# Patient Record
Sex: FEMALE | Race: WHITE | Employment: FULL TIME
[De-identification: names, ages, dates, MRNs, and addresses within clinical notes are randomized per-mention and may not be internally consistent; named-entity substitution may affect disease eponyms.]

---

## 2017-01-03 ENCOUNTER — OFFICE VISIT (OUTPATIENT)
Dept: FAMILY MEDICINE CLINIC | Facility: CLINIC | Age: 22
End: 2017-01-03

## 2017-01-03 VITALS
DIASTOLIC BLOOD PRESSURE: 72 MMHG | BODY MASS INDEX: 40.62 KG/M2 | HEART RATE: 64 BPM | WEIGHT: 215 LBS | SYSTOLIC BLOOD PRESSURE: 120 MMHG | OXYGEN SATURATION: 99 %

## 2017-01-03 DIAGNOSIS — G43.009 MIGRAINE WITHOUT AURA AND WITHOUT STATUS MIGRAINOSUS, NOT INTRACTABLE: ICD-10-CM

## 2017-01-03 DIAGNOSIS — F41.9 ANXIETY AND DEPRESSION: Primary | ICD-10-CM

## 2017-01-03 DIAGNOSIS — F32.A ANXIETY AND DEPRESSION: Primary | ICD-10-CM

## 2017-01-03 PROCEDURE — 99213 OFFICE O/P EST LOW 20 MIN: CPT | Performed by: NURSE PRACTITIONER

## 2017-01-03 RX ORDER — FLUOXETINE HYDROCHLORIDE 20 MG/1
20 CAPSULE ORAL DAILY
Qty: 30 CAPSULE | Refills: 2 | Status: SHIPPED | OUTPATIENT
Start: 2017-01-03 | End: 2017-04-09 | Stop reason: SDUPTHER

## 2017-01-03 ASSESSMENT — ENCOUNTER SYMPTOMS
DIARRHEA: 0
WHEEZING: 0
RESPIRATORY NEGATIVE: 1
GASTROINTESTINAL NEGATIVE: 1
CONSTIPATION: 0
SHORTNESS OF BREATH: 0
ABDOMINAL PAIN: 0
COUGH: 0
ALLERGIC/IMMUNOLOGIC NEGATIVE: 1
EYES NEGATIVE: 1

## 2017-01-03 ASSESSMENT — PATIENT HEALTH QUESTIONNAIRE - PHQ9
SUM OF ALL RESPONSES TO PHQ9 QUESTIONS 1 & 2: 2
SUM OF ALL RESPONSES TO PHQ QUESTIONS 1-9: 2
1. LITTLE INTEREST OR PLEASURE IN DOING THINGS: 1
2. FEELING DOWN, DEPRESSED OR HOPELESS: 1

## 2017-01-31 RX ORDER — RANITIDINE 150 MG/1
TABLET ORAL
Qty: 30 TABLET | Refills: 5 | Status: SHIPPED | OUTPATIENT
Start: 2017-01-31 | End: 2017-08-10 | Stop reason: SDUPTHER

## 2017-03-08 ENCOUNTER — OFFICE VISIT (OUTPATIENT)
Dept: FAMILY MEDICINE CLINIC | Facility: CLINIC | Age: 22
End: 2017-03-08

## 2017-03-08 ENCOUNTER — HOSPITAL ENCOUNTER (OUTPATIENT)
Age: 22
Setting detail: SPECIMEN
Discharge: HOME | End: 2017-03-08
Payer: COMMERCIAL

## 2017-03-08 VITALS
DIASTOLIC BLOOD PRESSURE: 68 MMHG | SYSTOLIC BLOOD PRESSURE: 120 MMHG | TEMPERATURE: 98 F | WEIGHT: 211 LBS | OXYGEN SATURATION: 99 % | BODY MASS INDEX: 39.87 KG/M2 | HEART RATE: 54 BPM

## 2017-03-08 DIAGNOSIS — N89.8 VAGINAL DISCHARGE: Primary | ICD-10-CM

## 2017-03-08 DIAGNOSIS — N89.8 VAGINAL DISCHARGE: ICD-10-CM

## 2017-03-08 DIAGNOSIS — R30.0 DYSURIA: ICD-10-CM

## 2017-03-08 LAB
BILIRUBIN, POC: NEGATIVE
BLOOD URINE, POC: NEGATIVE
CLARITY, POC: CLEAR
COLOR, POC: NORMAL
DIRECT EXAM: NORMAL
GLUCOSE URINE, POC: NEGATIVE
KETONES, POC: NEGATIVE
LEUKOCYTE EST, POC: NEGATIVE
Lab: NORMAL
NITRITE, POC: NEGATIVE
PH, POC: 5.5
PROTEIN, POC: NEGATIVE
SPECIFIC GRAVITY, POC: >1.03
SPECIMEN DESCRIPTION: NORMAL
STATUS: NORMAL
UROBILINOGEN, POC: NEGATIVE

## 2017-03-08 PROCEDURE — 81002 URINALYSIS NONAUTO W/O SCOPE: CPT | Performed by: NURSE PRACTITIONER

## 2017-03-08 PROCEDURE — 99213 OFFICE O/P EST LOW 20 MIN: CPT | Performed by: NURSE PRACTITIONER

## 2017-03-08 ASSESSMENT — ENCOUNTER SYMPTOMS
CONSTIPATION: 0
ABDOMINAL PAIN: 0
COUGH: 0
GASTROINTESTINAL NEGATIVE: 1
DIARRHEA: 0
RESPIRATORY NEGATIVE: 1
ALLERGIC/IMMUNOLOGIC NEGATIVE: 1
WHEEZING: 0
SHORTNESS OF BREATH: 0
EYES NEGATIVE: 1

## 2017-03-09 LAB
C TRACH DNA GENITAL QL NAA+PROBE: NEGATIVE
N. GONORRHOEAE DNA: NEGATIVE

## 2017-06-26 ENCOUNTER — HOSPITAL ENCOUNTER (OUTPATIENT)
Age: 22
Setting detail: SPECIMEN
Discharge: HOME | End: 2017-06-26
Payer: COMMERCIAL

## 2017-06-26 ENCOUNTER — OFFICE VISIT (OUTPATIENT)
Dept: FAMILY MEDICINE CLINIC | Age: 22
End: 2017-06-26
Payer: COMMERCIAL

## 2017-06-26 VITALS
DIASTOLIC BLOOD PRESSURE: 64 MMHG | WEIGHT: 209 LBS | TEMPERATURE: 98.5 F | OXYGEN SATURATION: 99 % | HEART RATE: 64 BPM | BODY MASS INDEX: 39.49 KG/M2 | SYSTOLIC BLOOD PRESSURE: 110 MMHG

## 2017-06-26 DIAGNOSIS — E66.9 OBESITY, UNSPECIFIED OBESITY SEVERITY, UNSPECIFIED OBESITY TYPE: ICD-10-CM

## 2017-06-26 DIAGNOSIS — Z00.00 HEALTH CARE MAINTENANCE: ICD-10-CM

## 2017-06-26 DIAGNOSIS — R53.83 FATIGUE, UNSPECIFIED TYPE: ICD-10-CM

## 2017-06-26 DIAGNOSIS — R53.83 FATIGUE, UNSPECIFIED TYPE: Primary | ICD-10-CM

## 2017-06-26 LAB
ABSOLUTE EOS #: 0.1 K/UL (ref 0–0.4)
ABSOLUTE LYMPH #: 2.1 K/UL (ref 1–4.8)
ABSOLUTE MONO #: 0.5 K/UL (ref 0.1–1.2)
ALBUMIN SERPL-MCNC: 4.3 G/DL (ref 3.5–5.2)
ALBUMIN/GLOBULIN RATIO: 1.5 (ref 1–2.5)
ALP BLD-CCNC: 66 U/L (ref 35–104)
ALT SERPL-CCNC: 33 U/L (ref 5–33)
ANION GAP SERPL CALCULATED.3IONS-SCNC: 15 MMOL/L (ref 9–17)
AST SERPL-CCNC: 27 U/L
BASOPHILS # BLD: 0 %
BASOPHILS ABSOLUTE: 0 K/UL (ref 0–0.2)
BILIRUB SERPL-MCNC: 0.27 MG/DL (ref 0.3–1.2)
BUN BLDV-MCNC: 18 MG/DL (ref 6–20)
BUN/CREAT BLD: ABNORMAL (ref 9–20)
CALCIUM SERPL-MCNC: 9.4 MG/DL (ref 8.6–10.4)
CHLORIDE BLD-SCNC: 104 MMOL/L (ref 98–107)
CHOLESTEROL/HDL RATIO: 3.7
CHOLESTEROL: 164 MG/DL
CO2: 23 MMOL/L (ref 20–31)
CREAT SERPL-MCNC: 0.73 MG/DL (ref 0.5–0.9)
DIFFERENTIAL TYPE: NORMAL
EOSINOPHILS RELATIVE PERCENT: 1 %
GFR AFRICAN AMERICAN: >60 ML/MIN
GFR NON-AFRICAN AMERICAN: >60 ML/MIN
GFR SERPL CREATININE-BSD FRML MDRD: ABNORMAL ML/MIN/{1.73_M2}
GFR SERPL CREATININE-BSD FRML MDRD: ABNORMAL ML/MIN/{1.73_M2}
GLUCOSE BLD-MCNC: 93 MG/DL (ref 70–99)
HCT VFR BLD CALC: 38.4 % (ref 36–46)
HDLC SERPL-MCNC: 44 MG/DL
HEMOGLOBIN: 12.9 G/DL (ref 12–16)
LDL CHOLESTEROL: 108 MG/DL (ref 0–130)
LYMPHOCYTES # BLD: 30 %
MCH RBC QN AUTO: 28.3 PG (ref 26–34)
MCHC RBC AUTO-ENTMCNC: 33.6 G/DL (ref 31–37)
MCV RBC AUTO: 84.2 FL (ref 80–100)
MONOCYTES # BLD: 7 %
PDW BLD-RTO: 13.8 % (ref 12.5–15.4)
PLATELET # BLD: 197 K/UL (ref 140–450)
PLATELET ESTIMATE: NORMAL
PMV BLD AUTO: 10.8 FL (ref 6–12)
POTASSIUM SERPL-SCNC: 4.1 MMOL/L (ref 3.7–5.3)
RBC # BLD: 4.57 M/UL (ref 4–5.2)
RBC # BLD: NORMAL 10*6/UL
SEG NEUTROPHILS: 62 %
SEGMENTED NEUTROPHILS ABSOLUTE COUNT: 4.4 K/UL (ref 1.8–7.7)
SODIUM BLD-SCNC: 142 MMOL/L (ref 135–144)
THYROXINE, FREE: 1.02 NG/DL (ref 0.93–1.7)
TOTAL PROTEIN: 7.2 G/DL (ref 6.4–8.3)
TRIGL SERPL-MCNC: 59 MG/DL
TSH SERPL DL<=0.05 MIU/L-ACNC: 1.06 MIU/L (ref 0.3–5)
VLDLC SERPL CALC-MCNC: NORMAL MG/DL (ref 1–30)
WBC # BLD: 7.1 K/UL (ref 3.5–11)
WBC # BLD: NORMAL 10*3/UL

## 2017-06-26 PROCEDURE — 99213 OFFICE O/P EST LOW 20 MIN: CPT | Performed by: NURSE PRACTITIONER

## 2017-06-26 ASSESSMENT — ENCOUNTER SYMPTOMS
ABDOMINAL PAIN: 0
COUGH: 0
DIARRHEA: 0
WHEEZING: 0
CONSTIPATION: 0
GASTROINTESTINAL NEGATIVE: 1
SHORTNESS OF BREATH: 0
RESPIRATORY NEGATIVE: 1
ALLERGIC/IMMUNOLOGIC NEGATIVE: 1
EYES NEGATIVE: 1

## 2017-07-13 ENCOUNTER — TELEPHONE (OUTPATIENT)
Dept: OBGYN CLINIC | Age: 22
End: 2017-07-13

## 2017-07-13 RX ORDER — METRONIDAZOLE 500 MG/1
500 TABLET ORAL 2 TIMES DAILY
Qty: 14 TABLET | Refills: 0 | Status: SHIPPED | OUTPATIENT
Start: 2017-07-13 | End: 2017-07-20

## 2017-08-11 RX ORDER — TOPIRAMATE 25 MG/1
TABLET ORAL
Qty: 60 TABLET | Refills: 2 | Status: SHIPPED | OUTPATIENT
Start: 2017-08-11 | End: 2017-11-14 | Stop reason: SDUPTHER

## 2017-09-11 RX ORDER — RANITIDINE 150 MG/1
TABLET ORAL
Qty: 30 TABLET | Refills: 5 | Status: SHIPPED | OUTPATIENT
Start: 2017-09-11 | End: 2018-03-14 | Stop reason: SDUPTHER

## 2017-09-26 ENCOUNTER — OFFICE VISIT (OUTPATIENT)
Dept: OBGYN CLINIC | Age: 22
End: 2017-09-26
Payer: COMMERCIAL

## 2017-09-26 ENCOUNTER — HOSPITAL ENCOUNTER (OUTPATIENT)
Age: 22
Setting detail: SPECIMEN
Discharge: HOME | End: 2017-09-26
Payer: COMMERCIAL

## 2017-09-26 VITALS
BODY MASS INDEX: 39.65 KG/M2 | DIASTOLIC BLOOD PRESSURE: 70 MMHG | HEART RATE: 78 BPM | RESPIRATION RATE: 18 BRPM | SYSTOLIC BLOOD PRESSURE: 116 MMHG | WEIGHT: 210 LBS | HEIGHT: 61 IN

## 2017-09-26 DIAGNOSIS — N89.8 VAGINAL DISCHARGE: Primary | ICD-10-CM

## 2017-09-26 LAB
DIRECT EXAM: NORMAL
Lab: NORMAL
SPECIMEN DESCRIPTION: NORMAL
STATUS: NORMAL

## 2017-09-26 PROCEDURE — 87491 CHLMYD TRACH DNA AMP PROBE: CPT

## 2017-09-26 PROCEDURE — 99214 OFFICE O/P EST MOD 30 MIN: CPT | Performed by: ADVANCED PRACTICE MIDWIFE

## 2017-09-26 PROCEDURE — 87660 TRICHOMONAS VAGIN DIR PROBE: CPT

## 2017-09-26 PROCEDURE — 87510 GARDNER VAG DNA DIR PROBE: CPT

## 2017-09-26 PROCEDURE — 87480 CANDIDA DNA DIR PROBE: CPT

## 2017-09-26 PROCEDURE — 87591 N.GONORRHOEAE DNA AMP PROB: CPT

## 2017-09-26 RX ORDER — NORGESTIMATE AND ETHINYL ESTRADIOL 7DAYSX3 28
KIT ORAL
Qty: 28 TABLET | Refills: 12 | Status: SHIPPED | OUTPATIENT
Start: 2017-09-26 | End: 2018-09-24

## 2017-09-26 ASSESSMENT — PATIENT HEALTH QUESTIONNAIRE - PHQ9
SUM OF ALL RESPONSES TO PHQ9 QUESTIONS 1 & 2: 0
2. FEELING DOWN, DEPRESSED OR HOPELESS: 0
SUM OF ALL RESPONSES TO PHQ QUESTIONS 1-9: 0
1. LITTLE INTEREST OR PLEASURE IN DOING THINGS: 0

## 2017-09-27 LAB
C TRACH DNA GENITAL QL NAA+PROBE: NEGATIVE
N. GONORRHOEAE DNA: NEGATIVE

## 2017-11-13 RX ORDER — FLUOXETINE HYDROCHLORIDE 20 MG/1
CAPSULE ORAL
Qty: 90 CAPSULE | Refills: 1 | Status: SHIPPED | OUTPATIENT
Start: 2017-11-13 | End: 2017-11-14 | Stop reason: SDUPTHER

## 2017-11-13 NOTE — TELEPHONE ENCOUNTER
Last filled 4/10/17  Last seen 17  No future appts. Next Visit Date:  No future appointments. Health Maintenance   Topic Date Due    Flu vaccine (1) 2017    Cervical cancer screen  2018 (Originally 2016)    Chlamydia screen  2018    DTaP/Tdap/Td vaccine (2 - Td) 2024    HIV screen  Completed       Hemoglobin A1C (%)   Date Value   2014 5.2   2014 5.2             ( goal A1C is < 7)   No results found for: LABMICR  LDL Cholesterol (mg/dL)   Date Value   2017 108       (goal LDL is <100)   AST (U/L)   Date Value   2017 27     ALT (U/L)   Date Value   2017 33     BUN (mg/dL)   Date Value   2017 18     BP Readings from Last 3 Encounters:   17 116/70   17 110/64   17 120/68          (goal 120/80)    All Future Testing planned in [facility]  Lab Frequency Next Occurrence   C. Trachomatis / N.  Gonorrhoeae, DNA Once 2017               Patient Active Problem List:     Heart murmur     History of  section     Obesity     Right knee pain     Heart murmur, systolic     Pulmonary HTN     Anxiety and depression     Migraine without aura and without status migrainosus, not intractable

## 2017-11-14 ENCOUNTER — OFFICE VISIT (OUTPATIENT)
Dept: FAMILY MEDICINE CLINIC | Age: 22
End: 2017-11-14
Payer: COMMERCIAL

## 2017-11-14 VITALS
DIASTOLIC BLOOD PRESSURE: 70 MMHG | TEMPERATURE: 98.2 F | HEART RATE: 75 BPM | SYSTOLIC BLOOD PRESSURE: 120 MMHG | BODY MASS INDEX: 39.3 KG/M2 | OXYGEN SATURATION: 99 % | WEIGHT: 208 LBS

## 2017-11-14 DIAGNOSIS — L03.316 CELLULITIS, UMBILICAL: Primary | ICD-10-CM

## 2017-11-14 PROCEDURE — G8427 DOCREV CUR MEDS BY ELIG CLIN: HCPCS | Performed by: NURSE PRACTITIONER

## 2017-11-14 PROCEDURE — 1036F TOBACCO NON-USER: CPT | Performed by: NURSE PRACTITIONER

## 2017-11-14 PROCEDURE — G8482 FLU IMMUNIZE ORDER/ADMIN: HCPCS | Performed by: NURSE PRACTITIONER

## 2017-11-14 PROCEDURE — 99213 OFFICE O/P EST LOW 20 MIN: CPT | Performed by: NURSE PRACTITIONER

## 2017-11-14 PROCEDURE — G8417 CALC BMI ABV UP PARAM F/U: HCPCS | Performed by: NURSE PRACTITIONER

## 2017-11-14 RX ORDER — TOPIRAMATE 25 MG/1
TABLET ORAL
Qty: 180 TABLET | Refills: 1 | Status: SHIPPED | OUTPATIENT
Start: 2017-11-14 | End: 2018-10-02 | Stop reason: SDUPTHER

## 2017-11-14 RX ORDER — FLUOXETINE HYDROCHLORIDE 20 MG/1
CAPSULE ORAL
Qty: 90 CAPSULE | Refills: 1 | Status: SHIPPED | OUTPATIENT
Start: 2017-11-14 | End: 2018-10-02 | Stop reason: SDUPTHER

## 2017-11-14 RX ORDER — AMOXICILLIN 500 MG/1
500 CAPSULE ORAL 2 TIMES DAILY
Qty: 20 CAPSULE | Refills: 0 | Status: SHIPPED | OUTPATIENT
Start: 2017-11-14 | End: 2017-11-24

## 2017-11-14 ASSESSMENT — ENCOUNTER SYMPTOMS
CONSTIPATION: 0
SHORTNESS OF BREATH: 0
ABDOMINAL PAIN: 1
DIARRHEA: 0
RESPIRATORY NEGATIVE: 1
COUGH: 0
EYES NEGATIVE: 1
ALLERGIC/IMMUNOLOGIC NEGATIVE: 1

## 2017-11-14 NOTE — PROGRESS NOTES
[facility] & [address] [facility] [address]  Dept: [phone]  Dept Fax: [phone]      Jono Stafford is a 20 y.o. female who presents today for her medical conditions/complaints as noted below. Jono Stafford is c/o of Other (possible infection in belly button x 1 week. Patient states no puss drainage. Just very sore inside and outside. Patient states there is an odor.) and Abdominal Pain (not sure if this is from the belly button.)        HPI:     HPI  Edelmira Rg is here with co of umbilical odor, some creamy discharge and some abdominal discomfort. She states this has been going on for approx one week. She did have flank pain, checked her urine and it was neg. She is getting some lower pelvic pain and feeling of pressure in her vagina.    Hemoglobin A1C (%)   Date Value   2014 5.2   2014 5.2             ( goal A1C is < 7)   No results found for: LABMICR  LDL Cholesterol (mg/dL)   Date Value   2017 108   2015 110       (goal LDL is <100)   AST (U/L)   Date Value   2017 27     ALT (U/L)   Date Value   2017 33     BUN (mg/dL)   Date Value   2017 18     BP Readings from Last 3 Encounters:   17 120/70   17 116/70   17 110/64          (goal 120/80)    Past Medical History:   Diagnosis Date    Anxiety and depression 2016    Heart murmur     History of  section     Obesity 2015      Past Surgical History:   Procedure Laterality Date     SECTION, LOW TRANSVERSE  3/17/14    Arrest of dilation    TOOTH EXTRACTION         Family History   Problem Relation Age of Onset    Hypertension Maternal Grandmother     Arthritis Maternal Grandmother     Lupus Maternal Grandmother     Osteoporosis Maternal Grandmother     High Blood Pressure Father     Breast Cancer Neg Hx     Cancer Neg Hx     Colon Cancer Neg Hx     Diabetes Neg Hx     Eclampsia Neg Hx     Ovarian Cancer Neg Hx   Labor Neg Hx     Spont Abortions Neg Hx     Stroke Neg Hx        Social History   Substance Use Topics    Smoking status: Never Smoker    Smokeless tobacco: Never Used    Alcohol use No      Current Outpatient Prescriptions   Medication Sig Dispense Refill    FLUoxetine (PROZAC) 20 MG capsule TAKE ONE CAPSULE BY MOUTH DAILY 90 capsule 1    topiramate (TOPAMAX) 25 MG tablet TAKE ONE TABLET BY MOUTH ONCE NIGHTLY 180 tablet 1    amoxicillin (AMOXIL) 500 MG capsule Take 1 capsule by mouth 2 times daily for 10 days 20 capsule 0    Norgestim-Eth Estrad Triphasic (TRINESSA, 28,) 0.18/0.215/0.25 MG-35 MCG TABS TAKE 1 TABLET BY MOUTH EVERY DAY 28 tablet 12    ranitidine (ZANTAC) 150 MG tablet TAKE ONE TABLET BY MOUTH EVERY EVENING 30 tablet 5     No current facility-administered medications for this visit. No Known Allergies    Health Maintenance   Topic Date Due    Cervical cancer screen  2018 (Originally 2016)    Chlamydia screen  2018    DTaP/Tdap/Td vaccine (2 - Td) 2024    Flu vaccine  Completed    HIV screen  Completed          Review of Systems   Constitutional: Negative. Negative for activity change, appetite change and fever. HENT: Negative for ear pain. Eyes: Negative. Respiratory: Negative. Negative for cough and shortness of breath. Cardiovascular: Negative. Negative for chest pain and palpitations. Gastrointestinal: Positive for abdominal pain. Negative for constipation and diarrhea. Endocrine: Negative. Negative for cold intolerance and heat intolerance. Genitourinary: Positive for pelvic pain. Negative for difficulty urinating, frequency and urgency. Musculoskeletal: Negative. Negative for arthralgias. Skin: Negative. Negative for pallor and rash. Allergic/Immunologic: Negative. Neurological: Positive for headaches. Negative for weakness and numbness. Hematological: Negative.     Psychiatric/Behavioral: Positive for dysphoric mood. Negative for sleep disturbance. The patient is not nervous/anxious. Objective:     /70 (Site: Left Arm, Position: Sitting, Cuff Size: Small Adult)   Pulse 75   Temp 98.2 °F (36.8 °C)   Wt 208 lb (94.3 kg)   SpO2 99%   BMI 39.30 kg/m²   Physical Exam   Constitutional: She is oriented to person, place, and time. She appears well-developed and well-nourished. HENT:   Head: Normocephalic. Eyes: Conjunctivae are normal.   Cardiovascular: Normal rate and regular rhythm. Pulmonary/Chest: Effort normal and breath sounds normal.   Abdominal: Soft. Bowel sounds are normal. She exhibits no distension and no mass. There is no tenderness. There is no rebound and no guarding. Erythema at the opening of the umbilicus, no discharge or drainage noted. Neurological: She is alert and oriented to person, place, and time. Skin: Skin is warm and dry. Psychiatric: She has a normal mood and affect. Her behavior is normal. Judgment and thought content normal.         Assessment:      1. Cellulitis, umbilical          Plan:      No orders of the defined types were placed in this encounter. 1. Cellulitis, umbilical    - amoxicillin (AMOXIL) 500 MG capsule; Take 1 capsule by mouth 2 times daily for 10 days  Dispense: 20 capsule; Refill: 0      No Follow-up on file. Patient given educational materials - see patient instructions. Discussed use, benefit, and side effects of prescribed medications. All patient questions answered. Pt voiced understanding. Reviewed health maintenance. Instructed to continue current medications, diet and exercise. Patient agreed with treatment plan. Follow up as directed.      Electronically signed by Jerome Duval NP on 11/14/2017

## 2017-11-14 NOTE — PROGRESS NOTES
Visit Information    Have you changed or started any medications since your last visit including any over-the-counter medicines, vitamins, or herbal medicines? no   Have you stopped taking any of your medications? Is so, why? -  no  Are you having any side effects from any of your medications? - no    Have you seen any other physician or provider since your last visit? Yes  obgyn  Have you had any other diagnostic tests since your last visit? yes - pap   Have you been seen in the emergency room and/or had an admission in a hospital since we last saw you?  no   Have you had your routine dental cleaning in the past 6 months?  no     Do you have an active MyChart account? If no, what is the barrier?   Yes    Patient Care Team:  Fidel Rodriguez NP as PCP - General (Certified Nurse Practitioner)  Clrai Zurita DO as Consulting Physician (Obstetrics & Gynecology)  Froy Daily MD as Obstetrician (Perinatology)  Kailyn Lockett MD as Consulting Physician (Internal Medicine Cardiovascular Disease)    Medical History Review  Past Medical, Family, and Social History reviewed and does contribute to the patient presenting condition    Health Maintenance   Topic Date Due    Flu vaccine (1) 09/01/2017    Cervical cancer screen  06/26/2018 (Originally 9/24/2016)    Chlamydia screen  09/26/2018    DTaP/Tdap/Td vaccine (2 - Td) 05/04/2024    HIV screen  Completed

## 2017-11-30 ENCOUNTER — OFFICE VISIT (OUTPATIENT)
Dept: FAMILY MEDICINE CLINIC | Age: 22
End: 2017-11-30
Payer: COMMERCIAL

## 2017-11-30 ENCOUNTER — HOSPITAL ENCOUNTER (OUTPATIENT)
Age: 22
Setting detail: SPECIMEN
Discharge: HOME | End: 2017-11-30
Payer: COMMERCIAL

## 2017-11-30 VITALS
TEMPERATURE: 98.3 F | WEIGHT: 211 LBS | HEART RATE: 74 BPM | SYSTOLIC BLOOD PRESSURE: 120 MMHG | DIASTOLIC BLOOD PRESSURE: 70 MMHG | OXYGEN SATURATION: 98 % | BODY MASS INDEX: 39.87 KG/M2

## 2017-11-30 DIAGNOSIS — R14.0 ABDOMINAL BLOATING: ICD-10-CM

## 2017-11-30 DIAGNOSIS — N89.8 VAGINAL DISCHARGE: ICD-10-CM

## 2017-11-30 DIAGNOSIS — R10.2 FEMALE PELVIC PAIN: Primary | ICD-10-CM

## 2017-11-30 DIAGNOSIS — R35.0 FREQUENCY OF URINATION: ICD-10-CM

## 2017-11-30 LAB
DIRECT EXAM: ABNORMAL
Lab: ABNORMAL
SPECIMEN DESCRIPTION: ABNORMAL
STATUS: ABNORMAL

## 2017-11-30 PROCEDURE — 1036F TOBACCO NON-USER: CPT | Performed by: NURSE PRACTITIONER

## 2017-11-30 PROCEDURE — G8482 FLU IMMUNIZE ORDER/ADMIN: HCPCS | Performed by: NURSE PRACTITIONER

## 2017-11-30 PROCEDURE — G8417 CALC BMI ABV UP PARAM F/U: HCPCS | Performed by: NURSE PRACTITIONER

## 2017-11-30 PROCEDURE — G8427 DOCREV CUR MEDS BY ELIG CLIN: HCPCS | Performed by: NURSE PRACTITIONER

## 2017-11-30 PROCEDURE — 99213 OFFICE O/P EST LOW 20 MIN: CPT | Performed by: NURSE PRACTITIONER

## 2017-11-30 ASSESSMENT — ENCOUNTER SYMPTOMS
WHEEZING: 0
RESPIRATORY NEGATIVE: 1
STRIDOR: 0
SHORTNESS OF BREATH: 0
COUGH: 0

## 2017-11-30 NOTE — PROGRESS NOTES
[address] [facility] [address]  Dept: [phone]  Dept Fax: [phone]      Kathlee Saint is a 20 y.o. female who presents today for her medical conditions/complaints as noted below. Kathlee Saint is c/o of Abdominal Pain (x3 weeks); Pelvic Pain (x3 weeks); and Back Pain ([id number])            HPI:     HPI  Brodie Rg is here with complaint of pelvic pain, heaviness, frequency, no dysuria, no vag odor, slight vag discharge. She is not currently sexually active. She states this has been present for the last several weeks. She has had her menses and felt no issue, this started just after that.  She has had one STD in her life, no dx of PID, fibroids, etc.     She dipped a urine at work and it was negative  Hemoglobin A1C (%)   Date Value   2014 5.2   2014 5.2             ( goal A1C is < 7)   No results found for: LABMICR  LDL Cholesterol (mg/dL)   Date Value   2017 108   2015 110       (goal LDL is <100)   AST (U/L)   Date Value   2017 27     ALT (U/L)   Date Value   2017 33     BUN (mg/dL)   Date Value   2017 18     BP Readings from Last 3 Encounters:   17 120/70   17 120/70   17 116/70          (goal 120/80)    Past Medical History:   Diagnosis Date    Anxiety and depression 2016    Heart murmur     History of  section     Obesity 2015      Past Surgical History:   Procedure Laterality Date     SECTION, LOW TRANSVERSE  3/17/14    Arrest of dilation    TOOTH EXTRACTION         Family History   Problem Relation Age of Onset    Hypertension Maternal Grandmother     Arthritis Maternal Grandmother     Lupus Maternal Grandmother     Osteoporosis Maternal Grandmother     High Blood Pressure Father     Breast Cancer Neg Hx     Cancer Neg Hx     Colon Cancer Neg Hx     Diabetes Neg Hx     Eclampsia Neg Hx     Ovarian Cancer Neg Hx      Labor Neg Hx rate and regular rhythm. Pulmonary/Chest: Effort normal and breath sounds normal.   Neurological: She is alert and oriented to person, place, and time. Skin: Skin is warm and dry. Psychiatric: She has a normal mood and affect. Her behavior is normal. Judgment and thought content normal.         Assessment:      1. Female pelvic pain    2. Abdominal bloating    3. Frequency of urination    4. Vaginal discharge          Plan:      Orders Placed This Encounter   Procedures    C. Trachomatis / N. Gonorrhoeae, DNA     Standing Status:   Future     Standing Expiration Date:   11/30/2018    Vaginitis DNA Probe     Standing Status:   Future     Standing Expiration Date:   11/30/2018    US PELVIS COMPLETE     Standing Status:   Future     Standing Expiration Date:   11/30/2018     Order Specific Question:   Reason for exam:     Answer:   pelvic pain     Urine clean  Will do vag swabs and get pelvic us  Due for pap next year  No Follow-up on file. Patient given educational materials - see patient instructions. Discussed use, benefit, and side effects of prescribed medications. All patient questions answered. Pt voiced understanding. Reviewed health maintenance. Instructed to continue current medications, diet and exercise. Patient agreed with treatment plan. Follow up as directed.      Electronically signed by Emilee Junior NP on 11/30/2017

## 2017-12-01 LAB
C TRACH DNA GENITAL QL NAA+PROBE: NEGATIVE
N. GONORRHOEAE DNA: NEGATIVE

## 2017-12-03 RX ORDER — METRONIDAZOLE 500 MG/1
500 TABLET ORAL 2 TIMES DAILY
Qty: 14 TABLET | Refills: 0 | Status: SHIPPED | OUTPATIENT
Start: 2017-12-03 | End: 2017-12-10

## 2017-12-04 ENCOUNTER — HOSPITAL ENCOUNTER (OUTPATIENT)
Dept: ULTRASOUND IMAGING | Age: 22
Discharge: HOME | End: 2017-12-04
Payer: COMMERCIAL

## 2017-12-04 DIAGNOSIS — R10.2 FEMALE PELVIC PAIN: ICD-10-CM

## 2017-12-04 DIAGNOSIS — R14.0 ABDOMINAL BLOATING: ICD-10-CM

## 2017-12-04 PROCEDURE — 76856 US EXAM PELVIC COMPLETE: CPT

## 2017-12-12 DIAGNOSIS — R10.2 FEMALE PELVIC PAIN: Primary | ICD-10-CM

## 2018-01-05 ENCOUNTER — HOSPITAL ENCOUNTER (OUTPATIENT)
Dept: CT IMAGING | Age: 23
Discharge: HOME | End: 2018-01-05
Payer: COMMERCIAL

## 2018-01-05 DIAGNOSIS — R10.2 FEMALE PELVIC PAIN: ICD-10-CM

## 2018-01-05 PROCEDURE — 6360000004 HC RX CONTRAST MEDICATION: Performed by: NURSE PRACTITIONER

## 2018-01-05 PROCEDURE — 74176 CT ABD & PELVIS W/O CONTRAST: CPT

## 2018-01-05 RX ADMIN — IOHEXOL 50 ML: 240 INJECTION, SOLUTION INTRATHECAL; INTRAVASCULAR; INTRAVENOUS; ORAL at 15:12

## 2018-03-13 ENCOUNTER — OFFICE VISIT (OUTPATIENT)
Dept: OBGYN CLINIC | Age: 23
End: 2018-03-13
Payer: COMMERCIAL

## 2018-03-13 VITALS
RESPIRATION RATE: 18 BRPM | HEART RATE: 78 BPM | WEIGHT: 212 LBS | BODY MASS INDEX: 40.02 KG/M2 | SYSTOLIC BLOOD PRESSURE: 118 MMHG | DIASTOLIC BLOOD PRESSURE: 70 MMHG | HEIGHT: 61 IN

## 2018-03-13 DIAGNOSIS — N94.6 DYSMENORRHEA: Primary | ICD-10-CM

## 2018-03-13 PROCEDURE — G8482 FLU IMMUNIZE ORDER/ADMIN: HCPCS | Performed by: ADVANCED PRACTICE MIDWIFE

## 2018-03-13 PROCEDURE — G8417 CALC BMI ABV UP PARAM F/U: HCPCS | Performed by: ADVANCED PRACTICE MIDWIFE

## 2018-03-13 PROCEDURE — G8427 DOCREV CUR MEDS BY ELIG CLIN: HCPCS | Performed by: ADVANCED PRACTICE MIDWIFE

## 2018-03-13 PROCEDURE — 99213 OFFICE O/P EST LOW 20 MIN: CPT | Performed by: ADVANCED PRACTICE MIDWIFE

## 2018-03-13 PROCEDURE — 1036F TOBACCO NON-USER: CPT | Performed by: ADVANCED PRACTICE MIDWIFE

## 2018-03-13 RX ORDER — IBUPROFEN 800 MG/1
TABLET ORAL
COMMUNITY
Start: 2018-02-21 | End: 2020-09-01 | Stop reason: SDUPTHER

## 2018-03-13 RX ORDER — LEVONORGESTREL / ETHINYL ESTRADIOL AND ETHINYL ESTRADIOL 150-30(84)
1 KIT ORAL DAILY
Qty: 91 TABLET | Refills: 4 | Status: SHIPPED | OUTPATIENT
Start: 2018-03-13 | End: 2018-09-24 | Stop reason: SDUPTHER

## 2018-03-13 ASSESSMENT — PATIENT HEALTH QUESTIONNAIRE - PHQ9
1. LITTLE INTEREST OR PLEASURE IN DOING THINGS: 0
SUM OF ALL RESPONSES TO PHQ QUESTIONS 1-9: 0
2. FEELING DOWN, DEPRESSED OR HOPELESS: 0
SUM OF ALL RESPONSES TO PHQ9 QUESTIONS 1 & 2: 0

## 2018-03-15 RX ORDER — RANITIDINE 150 MG/1
TABLET ORAL
Qty: 30 TABLET | Refills: 5 | Status: SHIPPED | OUTPATIENT
Start: 2018-03-15 | End: 2018-09-13 | Stop reason: SDUPTHER

## 2018-09-13 RX ORDER — RANITIDINE 150 MG/1
TABLET ORAL
Qty: 30 TABLET | Refills: 0 | Status: SHIPPED | OUTPATIENT
Start: 2018-09-13 | End: 2018-10-14 | Stop reason: SDUPTHER

## 2018-09-24 ENCOUNTER — OFFICE VISIT (OUTPATIENT)
Dept: OBGYN CLINIC | Age: 23
End: 2018-09-24
Payer: COMMERCIAL

## 2018-09-24 ENCOUNTER — HOSPITAL ENCOUNTER (OUTPATIENT)
Age: 23
Setting detail: SPECIMEN
Discharge: HOME | End: 2018-09-24
Payer: COMMERCIAL

## 2018-09-24 VITALS
WEIGHT: 215 LBS | BODY MASS INDEX: 40.59 KG/M2 | SYSTOLIC BLOOD PRESSURE: 118 MMHG | DIASTOLIC BLOOD PRESSURE: 78 MMHG | HEART RATE: 76 BPM | HEIGHT: 61 IN

## 2018-09-24 DIAGNOSIS — Z01.419 PAP SMEAR, AS PART OF ROUTINE GYNECOLOGICAL EXAMINATION: ICD-10-CM

## 2018-09-24 DIAGNOSIS — N76.1 CHRONIC VAGINITIS: ICD-10-CM

## 2018-09-24 DIAGNOSIS — Z01.419 PAP SMEAR, AS PART OF ROUTINE GYNECOLOGICAL EXAMINATION: Primary | ICD-10-CM

## 2018-09-24 LAB
DIRECT EXAM: NORMAL
Lab: NORMAL
SPECIMEN DESCRIPTION: NORMAL
STATUS: NORMAL

## 2018-09-24 PROCEDURE — 87491 CHLMYD TRACH DNA AMP PROBE: CPT

## 2018-09-24 PROCEDURE — 87591 N.GONORRHOEAE DNA AMP PROB: CPT

## 2018-09-24 PROCEDURE — G0145 SCR C/V CYTO,THINLAYER,RESCR: HCPCS

## 2018-09-24 PROCEDURE — 87480 CANDIDA DNA DIR PROBE: CPT

## 2018-09-24 PROCEDURE — 87660 TRICHOMONAS VAGIN DIR PROBE: CPT

## 2018-09-24 PROCEDURE — 99395 PREV VISIT EST AGE 18-39: CPT | Performed by: ADVANCED PRACTICE MIDWIFE

## 2018-09-24 PROCEDURE — 87510 GARDNER VAG DNA DIR PROBE: CPT

## 2018-09-24 RX ORDER — LEVONORGESTREL / ETHINYL ESTRADIOL AND ETHINYL ESTRADIOL 150-30(84)
1 KIT ORAL DAILY
Qty: 91 TABLET | Refills: 4 | Status: SHIPPED | OUTPATIENT
Start: 2018-09-24 | End: 2019-07-09 | Stop reason: ALTCHOICE

## 2018-09-24 ASSESSMENT — PATIENT HEALTH QUESTIONNAIRE - PHQ9
1. LITTLE INTEREST OR PLEASURE IN DOING THINGS: 0
SUM OF ALL RESPONSES TO PHQ QUESTIONS 1-9: 0
SUM OF ALL RESPONSES TO PHQ9 QUESTIONS 1 & 2: 0
2. FEELING DOWN, DEPRESSED OR HOPELESS: 0
SUM OF ALL RESPONSES TO PHQ QUESTIONS 1-9: 0

## 2018-09-24 NOTE — PROGRESS NOTES
Abortions Neg Hx     Stroke Neg Hx      Social History     Social History    Marital status: Single     Spouse name: N/A    Number of children: N/A    Years of education: N/A     Occupational History    Not on file. Social History Main Topics    Smoking status: Never Smoker    Smokeless tobacco: Never Used    Alcohol use No    Drug use: No    Sexual activity: Yes     Partners: Male     Other Topics Concern    Not on file     Social History Narrative    No narrative on file       MEDICATIONS:  Current Outpatient Prescriptions   Medication Sig Dispense Refill    Levonorgest-Eth Estrad 91-Day (Dangelo Fleming) 0.15-0.03 &0.01 MG TABS Take 1 Package by mouth daily Barrier contraception is recommended. 91 tablet 4    ranitidine (ZANTAC) 150 MG tablet TAKE ONE TABLET BY MOUTH EVERY EVENING 30 tablet 0     MG tablet       FLUoxetine (PROZAC) 20 MG capsule TAKE ONE CAPSULE BY MOUTH DAILY 90 capsule 1    topiramate (TOPAMAX) 25 MG tablet TAKE ONE TABLET BY MOUTH ONCE NIGHTLY 180 tablet 1     No current facility-administered medications for this visit. ALLERGIES:  Allergies as of 09/24/2018    (No Known Allergies)       Symptoms of decreased mood absent    **If either question is answered in a  positive fashion then complete the PHQ9 Scoring Evaluation and make the appropriate referral**      Immunization status: up to date and documented, stated as current, but no records available. Gynecologic History:  Menarche: 6 yo  Menopause at  yo     Patient's last menstrual period was 09/13/2018 (exact date). Sexually Active: Yes    STD History: No     Permanent Sterilization: No   Reversible Birth Control: Yes BCP        Hormone Replacement Exposure: No      Genetic Qualified Family History of Breast, Ovarian , Colon or Uterine Cancer: No     If YES see scanned worksheet.     Preventative Health Testing:  Date of Last Pap Smear: 9/2015 wnl  Abnormal Pap Smear History:   Colposcopy History: discussed. Skin:  There was a Normal Inspection of the skin without rashes or lesions. There were no rashes. (Papular, Maculopapular, Hives, Pustular, Macular)     There were no lesions (Ulcers, Erythema, Abn. Appearing Nevi)            Lymphatic:  No Lymph Nodes were Palpable in the neck , axilla or groin. # Of Lymph Nodes; Location ; Character [Normal]  [Shotty] [Tender] [Enlarged]     Neck and EENT:  The neck was supple. There were no masses   The thyroid was not enlarged and had no masses. Perrla, EOMI B/L, TMI B/L No Abnormalities. Throat inspected-No exudates or Masses, Nares Patent No Masses        Respiratory: The lungs were auscultated and found to be clear. There were no rales, rhonchi or wheezes. There was a good respiratory effort. Cardiovascular: The heart was in a regular rate and rhythm. . No S3 or S4. There was no murmur appreciated. Location, grade, and radiation are not applicable. Extremities: The patients extremities were without calf tenderness, edema, or varicosities. There was full range of motion in all four extremities. Pulses in all four extremities were appreciated and are 2/4. Abdomen: The abdomen was soft and non-tender. There were good bowel sounds in all quadrants and there was no guarding, rebound or rigidity. On evaluation there was no evidence of hepatosplenomegaly and there was no costal vertebral libertad tenderness bilaterally. No hernias were appreciated. Abdominal Scars: C/S    Psych: The patient had a normal Orientation to: Time, Place, Person, and Situation  There is no Mood / Affect changes    Breast:  (Chest)  normal appearance, no masses or tenderness, Inspection negative  Self breast exams were reviewed in detail. Literature was given. Pelvic Exam:  Vulva and vagina appear normal. Bimanual exam reveals normal uterus and adnexa. Rectal Exam:  exam declined by patient          Musculosk:  Normal Gait and station was noted.   Digits were

## 2018-09-24 NOTE — PATIENT INSTRUCTIONS
feel your breast tissue before moving on to the next spot. ¨ Check your entire breast, moving up and down as if following a strip from the collarbone to the bra line, and from the armpit to the ribs. Repeat until you have covered the entire breast.  ¨ Repeat this procedure for your left breast, using the pads of the 3 middle fingers of your right hand. · To examine your breasts while in the shower:  ¨ Place one arm over your head and lightly soap your breast on that side. ¨ Using the pads of your fingers, gently move your hand over your breast (in the strip pattern described above), feeling carefully for any lumps or changes. ¨ Repeat for the other breast.  · Have your doctor inspect anything you notice to see if you need further testing. Where can you learn more? Go to [email or web address] and sign in to your [employer] account. Enter P148 in the [employer] box to learn more about \"Breast Self-Exam: Care Instructions. \"     If you do not have an account, please click on the \"Sign Up Now\" link. Current as of: May 12, 2017  Content Version: 11.7  © 2753-1851 [employer]. Care instructions adapted under license by [facility] ([facility]). If you have questions about a medical condition or this instruction, always ask your healthcare professional. [address] any warranty or liability for your use of this information.

## 2018-09-25 LAB
C TRACH DNA GENITAL QL NAA+PROBE: NEGATIVE
N. GONORRHOEAE DNA: NEGATIVE

## 2018-10-02 ENCOUNTER — HOSPITAL ENCOUNTER (OUTPATIENT)
Age: 23
Setting detail: SPECIMEN
Discharge: HOME | End: 2018-10-02
Payer: COMMERCIAL

## 2018-10-02 ENCOUNTER — OFFICE VISIT (OUTPATIENT)
Dept: FAMILY MEDICINE CLINIC | Age: 23
End: 2018-10-02
Payer: COMMERCIAL

## 2018-10-02 VITALS
HEART RATE: 52 BPM | SYSTOLIC BLOOD PRESSURE: 120 MMHG | DIASTOLIC BLOOD PRESSURE: 70 MMHG | TEMPERATURE: 98.2 F | WEIGHT: 210 LBS | BODY MASS INDEX: 39.68 KG/M2 | OXYGEN SATURATION: 99 %

## 2018-10-02 DIAGNOSIS — Z13.220 SCREENING FOR HYPERLIPIDEMIA: ICD-10-CM

## 2018-10-02 DIAGNOSIS — Z79.899 MEDICATION MANAGEMENT: ICD-10-CM

## 2018-10-02 DIAGNOSIS — Z13.21 ENCOUNTER FOR VITAMIN DEFICIENCY SCREENING: ICD-10-CM

## 2018-10-02 DIAGNOSIS — Z23 NEED FOR INFLUENZA VACCINATION: ICD-10-CM

## 2018-10-02 DIAGNOSIS — R45.86 MOOD SWINGS: Primary | ICD-10-CM

## 2018-10-02 DIAGNOSIS — B35.1 ONYCHOMYCOSIS: ICD-10-CM

## 2018-10-02 DIAGNOSIS — F33.0 MILD EPISODE OF RECURRENT MAJOR DEPRESSIVE DISORDER (HCC): ICD-10-CM

## 2018-10-02 LAB
ALBUMIN SERPL-MCNC: 4.4 G/DL (ref 3.5–5.2)
ALBUMIN/GLOBULIN RATIO: 1.3 (ref 1–2.5)
ALP BLD-CCNC: 64 U/L (ref 35–104)
ALT SERPL-CCNC: 27 U/L (ref 5–33)
ANION GAP SERPL CALCULATED.3IONS-SCNC: 17 MMOL/L (ref 9–17)
AST SERPL-CCNC: 22 U/L
BILIRUB SERPL-MCNC: 0.43 MG/DL (ref 0.3–1.2)
BUN BLDV-MCNC: 14 MG/DL (ref 6–20)
BUN/CREAT BLD: ABNORMAL (ref 9–20)
CALCIUM SERPL-MCNC: 9.4 MG/DL (ref 8.6–10.4)
CHLORIDE BLD-SCNC: 108 MMOL/L (ref 98–107)
CHOLESTEROL/HDL RATIO: 4.6
CHOLESTEROL: 185 MG/DL
CO2: 19 MMOL/L (ref 20–31)
CREAT SERPL-MCNC: 0.81 MG/DL (ref 0.5–0.9)
GFR AFRICAN AMERICAN: >60 ML/MIN
GFR NON-AFRICAN AMERICAN: >60 ML/MIN
GFR SERPL CREATININE-BSD FRML MDRD: ABNORMAL ML/MIN/{1.73_M2}
GFR SERPL CREATININE-BSD FRML MDRD: ABNORMAL ML/MIN/{1.73_M2}
GLUCOSE BLD-MCNC: 83 MG/DL (ref 70–99)
HCT VFR BLD CALC: 41.1 % (ref 36.3–47.1)
HDLC SERPL-MCNC: 40 MG/DL
HEMOGLOBIN: 13.2 G/DL (ref 11.9–15.1)
LDL CHOLESTEROL: 131 MG/DL (ref 0–130)
MCH RBC QN AUTO: 28.8 PG (ref 25.2–33.5)
MCHC RBC AUTO-ENTMCNC: 32.1 G/DL (ref 28.4–34.8)
MCV RBC AUTO: 89.7 FL (ref 82.6–102.9)
NRBC AUTOMATED: 0 PER 100 WBC
PDW BLD-RTO: 12.2 % (ref 11.8–14.4)
PLATELET # BLD: 238 K/UL (ref 138–453)
PMV BLD AUTO: 12.5 FL (ref 8.1–13.5)
POTASSIUM SERPL-SCNC: 4.1 MMOL/L (ref 3.7–5.3)
RBC # BLD: 4.58 M/UL (ref 3.95–5.11)
SODIUM BLD-SCNC: 144 MMOL/L (ref 135–144)
TOTAL PROTEIN: 7.7 G/DL (ref 6.4–8.3)
TRIGL SERPL-MCNC: 69 MG/DL
VITAMIN B-12: 300 PG/ML (ref 232–1245)
VITAMIN D 25-HYDROXY: 80.7 NG/ML (ref 30–100)
VLDLC SERPL CALC-MCNC: ABNORMAL MG/DL (ref 1–30)
WBC # BLD: 7.1 K/UL (ref 3.5–11.3)

## 2018-10-02 PROCEDURE — 99214 OFFICE O/P EST MOD 30 MIN: CPT | Performed by: NURSE PRACTITIONER

## 2018-10-02 PROCEDURE — 1036F TOBACCO NON-USER: CPT | Performed by: NURSE PRACTITIONER

## 2018-10-02 PROCEDURE — G8427 DOCREV CUR MEDS BY ELIG CLIN: HCPCS | Performed by: NURSE PRACTITIONER

## 2018-10-02 PROCEDURE — 90688 IIV4 VACCINE SPLT 0.5 ML IM: CPT | Performed by: NURSE PRACTITIONER

## 2018-10-02 PROCEDURE — 90471 IMMUNIZATION ADMIN: CPT | Performed by: NURSE PRACTITIONER

## 2018-10-02 PROCEDURE — G8417 CALC BMI ABV UP PARAM F/U: HCPCS | Performed by: NURSE PRACTITIONER

## 2018-10-02 PROCEDURE — G8482 FLU IMMUNIZE ORDER/ADMIN: HCPCS | Performed by: NURSE PRACTITIONER

## 2018-10-02 RX ORDER — TOPIRAMATE 50 MG/1
TABLET, FILM COATED ORAL
Qty: 90 TABLET | Refills: 1 | Status: SHIPPED | OUTPATIENT
Start: 2018-10-02 | End: 2019-03-30 | Stop reason: SDUPTHER

## 2018-10-02 RX ORDER — FLUOXETINE HYDROCHLORIDE 20 MG/1
CAPSULE ORAL
Qty: 90 CAPSULE | Refills: 1 | Status: SHIPPED | OUTPATIENT
Start: 2018-10-02 | End: 2018-11-26 | Stop reason: SDUPTHER

## 2018-10-02 ASSESSMENT — PATIENT HEALTH QUESTIONNAIRE - PHQ9
SUM OF ALL RESPONSES TO PHQ QUESTIONS 1-9: 2
SUM OF ALL RESPONSES TO PHQ9 QUESTIONS 1 & 2: 2
1. LITTLE INTEREST OR PLEASURE IN DOING THINGS: 1
2. FEELING DOWN, DEPRESSED OR HOPELESS: 1
SUM OF ALL RESPONSES TO PHQ QUESTIONS 1-9: 2

## 2018-10-02 ASSESSMENT — ENCOUNTER SYMPTOMS
ALLERGIC/IMMUNOLOGIC NEGATIVE: 1
RESPIRATORY NEGATIVE: 1
COUGH: 0
EYES NEGATIVE: 1

## 2018-10-02 NOTE — PROGRESS NOTES
Cancer Neg Hx      Labor Neg Hx     Spont Abortions Neg Hx     Stroke Neg Hx        Social History   Substance Use Topics    Smoking status: Never Smoker    Smokeless tobacco: Never Used    Alcohol use No      Current Outpatient Prescriptions   Medication Sig Dispense Refill    topiramate (TOPAMAX) 50 MG tablet TAKE ONE TABLET BY MOUTH ONCE NIGHTLY 90 tablet 1    FLUoxetine (PROZAC) 20 MG capsule TAKE ONE CAPSULE BY MOUTH DAILY 90 capsule 1    Levonorgest-Eth Estrad 91-Day (SEASONIQUE) 0.15-0.03 &0.01 MG TABS Take 1 Package by mouth daily Barrier contraception is recommended. 91 tablet 4    ranitidine (ZANTAC) 150 MG tablet TAKE ONE TABLET BY MOUTH EVERY EVENING 30 tablet 0     MG tablet        No current facility-administered medications for this visit. No Known Allergies    Health Maintenance   Topic Date Due    Cervical cancer screen  2019    Chlamydia screen  2019    DTaP/Tdap/Td vaccine (2 - Tdap) 2024    Flu vaccine  Completed    HIV screen  Completed          Review of Systems   Constitutional: Negative. Negative for activity change, appetite change and fever. HENT: Negative for ear pain. Eyes: Negative. Respiratory: Negative. Negative for cough. Cardiovascular: Negative. Negative for chest pain and palpitations. Endocrine: Negative. Negative for cold intolerance and heat intolerance. Genitourinary: Negative. Negative for difficulty urinating. Musculoskeletal: Negative. Negative for arthralgias. Skin: Negative. Allergic/Immunologic: Negative. Neurological: Positive for headaches. Negative for weakness and numbness. Hematological: Negative. Psychiatric/Behavioral: Positive for dysphoric mood. Negative for sleep disturbance. The patient is nervous/anxious.         Objective:     /70 (Site: Left Upper Arm, Position: Sitting, Cuff Size: Large Adult)   Pulse 52   Temp 98.2 °F (36.8 °C) (Oral)   Wt 210 lb (95.3 kg) for vitamin deficiency screening    - Vitamin D 25 Hydroxy    4. Screening for hyperlipidemia    - Lipid Panel; Future    5. Medication management    - CBC; Future  - Comprehensive Metabolic Panel; Future  - Vitamin B12; Future    6. Need for influenza vaccination    - INFLUENZA, QUADV, 3 YRS AND OLDER, IM, MDV, 0.5ML (Anjel Richardleanne)    7. Mild episode of recurrent major depressive disorder (HCC)  Continue prozac, working well for anxity  - FLUoxetine (PROZAC) 20 MG capsule; TAKE ONE CAPSULE BY MOUTH DAILY  Dispense: 90 capsule; Refill: 1      Return in about 1 month (around 11/2/2018) for medication check for depression. Patient given educational materials - see patient instructions. Discussed use, benefit, and side effects of prescribed medications. All patient questions answered. Pt voiced understanding. Reviewed health maintenance. Instructed to continue current medications, diet and exercise. Patient agreed with treatment plan. Follow up as directed.      Electronically signed by LUCY Eckert CNP on 10/2/2018

## 2018-10-02 NOTE — PATIENT INSTRUCTIONS
as heart or lung disease, flu can make it worse. Flu is more dangerous for some people. Infants and young children, people 72years of age and older, pregnant women, and people with certain health conditions or a weakened immune system are at greatest risk. Each year thousands of people in the [facility] die from flu, and many more are hospitalized. Flu vaccine can:  · Keep you from getting flu. · Make flu less severe if you do get it. · Keep you from spreading flu to your family and other people. Inactivated and recombinant flu vaccines  A dose of flu vaccine is recommended every flu season. Children 6 months through 6years of age may need two doses during the same flu season. Everyone else needs only one dose each flu season. Some inactivated flu vaccines contain a very small amount of a mercury-based preservative called thimerosal. Studies have not shown thimerosal in vaccines to be harmful, but flu vaccines that do not contain thimerosal are available. There is no live flu virus in flu shots. They cannot cause the flu. There are many flu viruses, and they are always changing. Each year a new flu vaccine is made to protect against three or four viruses that are likely to cause disease in the upcoming flu season. But even when the vaccine doesn't exactly match these viruses, it may still provide some protection. Flu vaccine cannot prevent:  · Flu that is caused by a virus not covered by the vaccine. · Illnesses that look like flu but are not. Some people should not get this vaccine  Tell the person who is giving you the vaccine:  · If you have any severe (life-threatening) allergies. If you ever had a life-threatening allergic reaction after a dose of flu vaccine, or have a severe allergy to any part of this vaccine, you may be advised not to get vaccinated. Most, but not all, types of flu vaccine contain a small amount of egg protein.   · If you ever had Guillain-Barré syndrome (also called GBS) cause a severe allergic reaction. Such reactions from a vaccine are very rare, estimated at about 1 in a million doses, and would happen within a few minutes to a few hours after the vaccination. As with any medicine, there is a very remote chance of a vaccine causing a serious injury or death. The safety of vaccines is always being monitored. For more information, visit: www.cdc.gov/vaccinesafety/. What if there is a serious reaction? What should I look for? · Look for anything that concerns you, such as signs of a severe allergic reaction, very high fever, or unusual behavior. Signs of a severe allergic reaction can include hives, swelling of the face and throat, difficulty breathing, a fast heartbeat, dizziness, and weakness - usually within a few minutes to a few hours after the vaccination. What should I do? · If you think it is a severe allergic reaction or other emergency that can't wait, call 9-1-1 and get the person to the nearest hospital. Otherwise, call your doctor. · Reactions should be reported to the \"Vaccine Adverse Event Reporting System\" (VAERS). Your doctor should file this report, or you can do it yourself through the VAERS website at www.vaers. [facility].gov, or by calling 1-778.669.8965. [employer] does not give medical advice. The National Vaccine Injury Compensation Program  The National Vaccine Injury Compensation Program (VICP) is a federal program that was created to compensate people who may have been injured by certain vaccines. Persons who believe they may have been injured by a vaccine can learn about the program and about filing a claim by calling 0-882.163.3490 or visiting the [employer] website at www.[facility].gov/vaccinecompensation. There is a time limit to file a claim for compensation. How can I learn more? · Ask your healthcare provider. He or she can give you the vaccine package insert or suggest other sources of information. · Call your local or state health department.   · Contact

## 2018-10-03 DIAGNOSIS — L60.8 ONYCHOMADESIS OF TOENAIL: Primary | ICD-10-CM

## 2018-10-03 RX ORDER — GRISEOFULVIN 500 MG/1
500 TABLET ORAL DAILY
Qty: 90 TABLET | Refills: 0 | Status: CANCELLED | OUTPATIENT
Start: 2018-10-03 | End: 2019-01-01

## 2018-10-03 RX ORDER — TERBINAFINE HYDROCHLORIDE 250 MG/1
250 TABLET ORAL DAILY
Qty: 90 TABLET | Refills: 0 | Status: SHIPPED | OUTPATIENT
Start: 2018-10-03 | End: 2019-01-01

## 2018-10-05 LAB — CYTOLOGY REPORT: NORMAL

## 2018-10-11 ENCOUNTER — HOSPITAL ENCOUNTER (EMERGENCY)
Age: 23
Discharge: HOME | End: 2018-10-11
Attending: EMERGENCY MEDICINE
Payer: COMMERCIAL

## 2018-10-11 VITALS
DIASTOLIC BLOOD PRESSURE: 68 MMHG | WEIGHT: 210 LBS | RESPIRATION RATE: 18 BRPM | BODY MASS INDEX: 39.65 KG/M2 | TEMPERATURE: 98.2 F | OXYGEN SATURATION: 97 % | SYSTOLIC BLOOD PRESSURE: 122 MMHG | HEIGHT: 61 IN | HEART RATE: 62 BPM

## 2018-10-11 DIAGNOSIS — R31.9 URINARY TRACT INFECTION WITH HEMATURIA, SITE UNSPECIFIED: Primary | ICD-10-CM

## 2018-10-11 DIAGNOSIS — N39.0 URINARY TRACT INFECTION WITH HEMATURIA, SITE UNSPECIFIED: Primary | ICD-10-CM

## 2018-10-11 LAB
-: ABNORMAL
AMORPHOUS: ABNORMAL
BACTERIA: ABNORMAL
BILIRUBIN URINE: NEGATIVE
CASTS UA: ABNORMAL /LPF
COLOR: YELLOW
COMMENT UA: ABNORMAL
CRYSTALS, UA: ABNORMAL /HPF
EPITHELIAL CELLS UA: ABNORMAL /HPF
GLUCOSE URINE: NEGATIVE
HCG(URINE) PREGNANCY TEST: NEGATIVE
KETONES, URINE: NEGATIVE
LEUKOCYTE ESTERASE, URINE: ABNORMAL
MUCUS: ABNORMAL
NITRITE, URINE: NEGATIVE
OTHER OBSERVATIONS UA: ABNORMAL
PH UA: 7 (ref 5–8)
PROTEIN UA: ABNORMAL
RBC UA: ABNORMAL /HPF
RENAL EPITHELIAL, UA: ABNORMAL /HPF
SPECIFIC GRAVITY UA: 1.01 (ref 1–1.03)
TRICHOMONAS: ABNORMAL
TURBIDITY: ABNORMAL
URINE HGB: ABNORMAL
UROBILINOGEN, URINE: NORMAL
WBC UA: ABNORMAL /HPF
YEAST: ABNORMAL

## 2018-10-11 PROCEDURE — 99283 EMERGENCY DEPT VISIT LOW MDM: CPT

## 2018-10-11 PROCEDURE — 6370000000 HC RX 637 (ALT 250 FOR IP): Performed by: EMERGENCY MEDICINE

## 2018-10-11 PROCEDURE — 86403 PARTICLE AGGLUT ANTBDY SCRN: CPT

## 2018-10-11 PROCEDURE — 81001 URINALYSIS AUTO W/SCOPE: CPT

## 2018-10-11 PROCEDURE — 87077 CULTURE AEROBIC IDENTIFY: CPT

## 2018-10-11 PROCEDURE — 87086 URINE CULTURE/COLONY COUNT: CPT

## 2018-10-11 PROCEDURE — 84703 CHORIONIC GONADOTROPIN ASSAY: CPT

## 2018-10-11 RX ORDER — PHENAZOPYRIDINE HYDROCHLORIDE 200 MG/1
200 TABLET, FILM COATED ORAL 3 TIMES DAILY PRN
Qty: 6 TABLET | Refills: 0 | Status: SHIPPED | OUTPATIENT
Start: 2018-10-11 | End: 2018-11-27

## 2018-10-11 RX ORDER — SULFAMETHOXAZOLE AND TRIMETHOPRIM 800; 160 MG/1; MG/1
1 TABLET ORAL 2 TIMES DAILY
Qty: 20 TABLET | Refills: 0 | Status: SHIPPED | OUTPATIENT
Start: 2018-10-11 | End: 2018-11-27

## 2018-10-11 RX ORDER — SULFAMETHOXAZOLE AND TRIMETHOPRIM 800; 160 MG/1; MG/1
1 TABLET ORAL ONCE
Status: COMPLETED | OUTPATIENT
Start: 2018-10-11 | End: 2018-10-11

## 2018-10-11 RX ORDER — PHENAZOPYRIDINE HYDROCHLORIDE 200 MG/1
200 TABLET, FILM COATED ORAL ONCE
Status: COMPLETED | OUTPATIENT
Start: 2018-10-11 | End: 2018-10-11

## 2018-10-11 RX ADMIN — PHENAZOPYRIDINE HYDROCHLORIDE 200 MG: 200 TABLET ORAL at 09:32

## 2018-10-11 RX ADMIN — SULFAMETHOXAZOLE AND TRIMETHOPRIM 1 TABLET: 800; 160 TABLET ORAL at 09:32

## 2018-10-11 ASSESSMENT — ENCOUNTER SYMPTOMS
SHORTNESS OF BREATH: 0
NAUSEA: 0
EYE DISCHARGE: 0
STRIDOR: 0
VOMITING: 0
SORE THROAT: 0
ABDOMINAL PAIN: 1
COLOR CHANGE: 0
EYE REDNESS: 0
WHEEZING: 0
DIARRHEA: 0
CONSTIPATION: 0
COUGH: 0
EYE PAIN: 0

## 2018-10-11 ASSESSMENT — PAIN SCALES - GENERAL: PAINLEVEL_OUTOF10: 5

## 2018-10-11 ASSESSMENT — PAIN DESCRIPTION - LOCATION: LOCATION: FLANK

## 2018-10-11 ASSESSMENT — PAIN DESCRIPTION - PAIN TYPE: TYPE: ACUTE PAIN

## 2018-10-11 ASSESSMENT — PAIN DESCRIPTION - ORIENTATION: ORIENTATION: LEFT

## 2018-10-11 NOTE — ED PROVIDER NOTES
[address]  [address] [address]  Phone: [phone]        Pt Name: Alysa Hardin  MRN: [id number]  [place] 1995  Date of evaluation: 10/11/18      CHIEF COMPLAINT     Chief Complaint   Patient presents with    Hematuria    Flank Pain     left       HISTORY OF PRESENT ILLNESS  (Location/Symptom, Timing/Onset, Context/Setting, Quality, Duration, Modifying Factors, Severity.)    Alysa Hardin is a 24 y.o. female who presents complaining of left-sided flank pain and hematuria. She relates it is now mainly in the left lower quadrant. She is noticing that it feels like razor blades when she urinates. It started yesterday. She has had no fever or chills. No nausea or vomiting. No discharge. She is not concerned about STDs. REVIEW OF SYSTEMS    (2-9 systems for level 4, 10 or more for level 5)     Review of Systems   Constitutional: Negative for activity change, appetite change, chills, fatigue and fever. HENT: Negative for congestion, ear pain and sore throat. Eyes: Negative for pain, discharge and redness. Respiratory: Negative for cough, shortness of breath, wheezing and stridor. Cardiovascular: Negative for chest pain. Gastrointestinal: Positive for abdominal pain. Negative for constipation, diarrhea, nausea and vomiting. Genitourinary: Positive for dysuria and flank pain. Negative for decreased urine volume and difficulty urinating. Musculoskeletal: Negative for arthralgias and myalgias. Skin: Negative for color change and rash. Neurological: Negative for dizziness, weakness and headaches. Psychiatric/Behavioral: Negative for behavioral problems and confusion. PAST MEDICAL HISTORY    has a past medical history of Anxiety and depression; Anxiety and depression; Heart murmur; History of  section; and Obesity.     SURGICAL HISTORY      has a past surgical history that includes Tooth Extraction and  section, low transverse regular rhythm and normal heart sounds. No murmur heard. Pulmonary/Chest: Effort normal and breath sounds normal. No respiratory distress. She has no wheezes. She has no rales. Abdominal: Soft. Bowel sounds are normal. She exhibits no distension and no mass. There is no tenderness. There is no rebound and no guarding. Musculoskeletal: Normal range of motion. She exhibits no edema or tenderness. Neurological: She is alert and oriented to person, place, and time. No cranial nerve deficit. She exhibits normal muscle tone. Coordination normal.   Skin: Skin is warm. No rash noted. She is not diaphoretic. Psychiatric: She has a normal mood and affect. Her behavior is normal.       DIFFERENTIAL DIAGNOSIS/ MDM:     I did discuss with her that we will check a urine and make sure that there is an infected infection. If there is not then I would go ahead and do more lab work and imaging. She is comfortable with this plan of care and verbalized understanding. I suspect more likely UTI or pyelonephritis versus kidney stone.     DIAGNOSTIC RESULTS     EKG: All EKG's are interpreted by the Emergency Department Physician who either signs or Co-signs this chart in the [address] a cardiologist.    None    RADIOLOGY:  Non-plain film images such as CT, Ultrasound and MRI are read by the radiologist. [facility] radiographic images are visualized and the radiologist interpretations are reviewed as follows:     Interpretation per the Radiologist below, if available at the time of this note:    None    LABS:  Results for orders placed or performed during the hospital encounter of 10/11/18   Urinalysis Reflex to Culture   Result Value Ref Range    Color, UA YELLOW YEL    Turbidity UA CLOUDY (A) CLEAR    Glucose, Ur NEGATIVE NEG    Bilirubin Urine NEGATIVE NEG    Ketones, Urine NEGATIVE NEG    Specific [place], UA 1.015 1.000 - 1.030    Urine Hgb LARGE (A) NEG    pH, UA 7.0 5.0 - 8.0    Protein, UA 1+ (A) NEG    Urobilinogen, Urine dictations but occasionally words are mis-transcribed.)    Muhammad MD, F.A.C.E.P.   Attending Emergency Medicine Physician        Chelita Chairez MD  10/11/18 [id number]

## 2018-10-12 LAB
CULTURE: ABNORMAL
Lab: ABNORMAL
SPECIMEN DESCRIPTION: ABNORMAL
STATUS: ABNORMAL

## 2018-10-15 RX ORDER — RANITIDINE 150 MG/1
TABLET ORAL
Qty: 90 TABLET | Refills: 1 | Status: SHIPPED | OUTPATIENT
Start: 2018-10-15 | End: 2019-04-30 | Stop reason: SDUPTHER

## 2018-10-15 NOTE — TELEPHONE ENCOUNTER
Last visit: 10/2/18  Last Med refill: 18    Next Visit Date:  Future Appointments  Date Time Provider Sangita Olmos   10/30/2018 4:15 PM Margie Grace, APRN - CNP [facility] [address] Maintenance   Topic Date Due    Cervical cancer screen  2019    Chlamydia screen  2019    DTaP/Tdap/Td vaccine (2 - Tdap) 2024    Flu vaccine  Completed    HIV screen  Completed       Hemoglobin A1C (%)   Date Value   2014 5.2   2014 5.2             ( goal A1C is < 7)   No results found for: LABMICR  LDL Cholesterol (mg/dL)   Date Value   10/02/2018 131 (H)   2017 108       (goal LDL is <100)   AST (U/L)   Date Value   10/02/2018 22     ALT (U/L)   Date Value   10/02/2018 27     BUN (mg/dL)   Date Value   10/02/2018 14     BP Readings from Last 3 Encounters:   10/11/18 122/68   10/02/18 120/70   18 118/78          (goal 120/80)    All Future Testing planned in [facility]  Lab Frequency Next Occurrence               Patient Active Problem List:     Heart murmur     History of  section     Obesity     Right knee pain     Heart murmur, systolic     Pulmonary HTN (HCC)     Anxiety and depression     Migraine without aura and without status migrainosus, not intractable     Onychomadesis of toenail

## 2018-10-30 ENCOUNTER — OFFICE VISIT (OUTPATIENT)
Dept: FAMILY MEDICINE CLINIC | Age: 23
End: 2018-10-30
Payer: COMMERCIAL

## 2018-10-30 VITALS
TEMPERATURE: 98.6 F | DIASTOLIC BLOOD PRESSURE: 70 MMHG | OXYGEN SATURATION: 100 % | WEIGHT: 214 LBS | SYSTOLIC BLOOD PRESSURE: 122 MMHG | HEART RATE: 58 BPM | BODY MASS INDEX: 40.43 KG/M2

## 2018-10-30 DIAGNOSIS — F41.9 ANXIETY AND DEPRESSION: Primary | ICD-10-CM

## 2018-10-30 DIAGNOSIS — R39.15 URINARY URGENCY: ICD-10-CM

## 2018-10-30 DIAGNOSIS — F32.A ANXIETY AND DEPRESSION: Primary | ICD-10-CM

## 2018-10-30 LAB
BILIRUBIN, POC: NEGATIVE
BLOOD URINE, POC: NEGATIVE
CLARITY, POC: ABNORMAL
COLOR, POC: ABNORMAL
GLUCOSE URINE, POC: NEGATIVE
KETONES, POC: NEGATIVE
LEUKOCYTE EST, POC: ABNORMAL
NITRITE, POC: NEGATIVE
PH, POC: 8.5
PROTEIN, POC: NEGATIVE
SPECIFIC GRAVITY, POC: 1.01
UROBILINOGEN, POC: 0.2

## 2018-10-30 PROCEDURE — 1036F TOBACCO NON-USER: CPT | Performed by: NURSE PRACTITIONER

## 2018-10-30 PROCEDURE — 81003 URINALYSIS AUTO W/O SCOPE: CPT | Performed by: NURSE PRACTITIONER

## 2018-10-30 PROCEDURE — G8482 FLU IMMUNIZE ORDER/ADMIN: HCPCS | Performed by: NURSE PRACTITIONER

## 2018-10-30 PROCEDURE — 96160 PT-FOCUSED HLTH RISK ASSMT: CPT | Performed by: NURSE PRACTITIONER

## 2018-10-30 PROCEDURE — 99213 OFFICE O/P EST LOW 20 MIN: CPT | Performed by: NURSE PRACTITIONER

## 2018-10-30 PROCEDURE — G8427 DOCREV CUR MEDS BY ELIG CLIN: HCPCS | Performed by: NURSE PRACTITIONER

## 2018-10-30 PROCEDURE — G8417 CALC BMI ABV UP PARAM F/U: HCPCS | Performed by: NURSE PRACTITIONER

## 2018-10-30 RX ORDER — ARIPIPRAZOLE 2 MG/1
2 TABLET ORAL DAILY
Qty: 30 TABLET | Refills: 1 | Status: SHIPPED | OUTPATIENT
Start: 2018-10-30 | End: 2018-11-26

## 2018-10-30 ASSESSMENT — PATIENT HEALTH QUESTIONNAIRE - PHQ9
1. LITTLE INTEREST OR PLEASURE IN DOING THINGS: 3
3. TROUBLE FALLING OR STAYING ASLEEP: 3
7. TROUBLE CONCENTRATING ON THINGS, SUCH AS READING THE NEWSPAPER OR WATCHING TELEVISION: 3
9. THOUGHTS THAT YOU WOULD BE BETTER OFF DEAD, OR OF HURTING YOURSELF: 0
4. FEELING TIRED OR HAVING LITTLE ENERGY: 3
SUM OF ALL RESPONSES TO PHQ9 QUESTIONS 1 & 2: 6
SUM OF ALL RESPONSES TO PHQ QUESTIONS 1-9: 21
2. FEELING DOWN, DEPRESSED OR HOPELESS: 3
10. IF YOU CHECKED OFF ANY PROBLEMS, HOW DIFFICULT HAVE THESE PROBLEMS MADE IT FOR YOU TO DO YOUR WORK, TAKE CARE OF THINGS AT HOME, OR GET ALONG WITH OTHER PEOPLE: 1
8. MOVING OR SPEAKING SO SLOWLY THAT OTHER PEOPLE COULD HAVE NOTICED. OR THE OPPOSITE, BEING SO FIGETY OR RESTLESS THAT YOU HAVE BEEN MOVING AROUND A LOT MORE THAN USUAL: 0
SUM OF ALL RESPONSES TO PHQ QUESTIONS 1-9: 21
5. POOR APPETITE OR OVEREATING: 3
6. FEELING BAD ABOUT YOURSELF - OR THAT YOU ARE A FAILURE OR HAVE LET YOURSELF OR YOUR FAMILY DOWN: 3

## 2018-10-30 ASSESSMENT — ENCOUNTER SYMPTOMS
ALLERGIC/IMMUNOLOGIC NEGATIVE: 1
EYES NEGATIVE: 1

## 2018-10-30 NOTE — PROGRESS NOTES
Cancer Neg Hx     Diabetes Neg Hx     Eclampsia Neg Hx     Ovarian Cancer Neg Hx      Labor Neg Hx     Spont Abortions Neg Hx     Stroke Neg Hx        Social History   Substance Use Topics    Smoking status: Never Smoker    Smokeless tobacco: Never Used    Alcohol use No      Current Outpatient Prescriptions   Medication Sig Dispense Refill    ARIPiprazole (ABILIFY) 2 MG tablet Take 1 tablet by mouth daily 30 tablet 1    ranitidine (ZANTAC) 150 MG tablet TAKE ONE TABLET BY MOUTH EVERY EVENING 90 tablet 1    terbinafine (LAMISIL) 250 MG tablet Take 1 tablet by mouth daily 90 tablet 0    topiramate (TOPAMAX) 50 MG tablet TAKE ONE TABLET BY MOUTH ONCE NIGHTLY 90 tablet 1    FLUoxetine (PROZAC) 20 MG capsule TAKE ONE CAPSULE BY MOUTH DAILY 90 capsule 1    Levonorgest-Eth Estrad 91-Day (SEASONIQUE) 0.15-0.03 &0.01 MG TABS Take 1 Package by mouth daily Barrier contraception is recommended. 91 tablet 4     MG tablet        No current facility-administered medications for this visit. No Known Allergies    Health Maintenance   Topic Date Due    Cervical cancer screen  2019    Chlamydia screen  2019    DTaP/Tdap/Td vaccine (2 - Tdap) 2024    Flu vaccine  Completed    HIV screen  Completed          Review of Systems   Constitutional: Negative. Negative for activity change and appetite change. Eyes: Negative. Cardiovascular: Negative. Negative for chest pain and palpitations. Endocrine: Negative. Negative for cold intolerance and heat intolerance. Genitourinary: Negative. Negative for difficulty urinating. Musculoskeletal: Negative. Negative for arthralgias. Skin: Negative. Allergic/Immunologic: Negative. Neurological: Positive for headaches. Negative for weakness and numbness. Hematological: Negative. Psychiatric/Behavioral: Positive for dysphoric mood. Negative for sleep disturbance. The patient is not nervous/anxious.         Objective:

## 2018-11-26 ENCOUNTER — OFFICE VISIT (OUTPATIENT)
Dept: FAMILY MEDICINE CLINIC | Age: 23
End: 2018-11-26
Payer: COMMERCIAL

## 2018-11-26 VITALS
TEMPERATURE: 98 F | WEIGHT: 212 LBS | HEART RATE: 58 BPM | SYSTOLIC BLOOD PRESSURE: 110 MMHG | BODY MASS INDEX: 40.06 KG/M2 | OXYGEN SATURATION: 100 % | DIASTOLIC BLOOD PRESSURE: 60 MMHG

## 2018-11-26 DIAGNOSIS — F41.9 ANXIETY AND DEPRESSION: Primary | ICD-10-CM

## 2018-11-26 DIAGNOSIS — K52.9 ACUTE GASTROENTERITIS: ICD-10-CM

## 2018-11-26 DIAGNOSIS — I27.20 PULMONARY HTN (HCC): ICD-10-CM

## 2018-11-26 DIAGNOSIS — R45.86 MOOD SWING: ICD-10-CM

## 2018-11-26 DIAGNOSIS — F33.0 MILD EPISODE OF RECURRENT MAJOR DEPRESSIVE DISORDER (HCC): ICD-10-CM

## 2018-11-26 DIAGNOSIS — F32.A ANXIETY AND DEPRESSION: Primary | ICD-10-CM

## 2018-11-26 PROCEDURE — G8417 CALC BMI ABV UP PARAM F/U: HCPCS | Performed by: NURSE PRACTITIONER

## 2018-11-26 PROCEDURE — G8482 FLU IMMUNIZE ORDER/ADMIN: HCPCS | Performed by: NURSE PRACTITIONER

## 2018-11-26 PROCEDURE — G8427 DOCREV CUR MEDS BY ELIG CLIN: HCPCS | Performed by: NURSE PRACTITIONER

## 2018-11-26 PROCEDURE — 1036F TOBACCO NON-USER: CPT | Performed by: NURSE PRACTITIONER

## 2018-11-26 PROCEDURE — 99214 OFFICE O/P EST MOD 30 MIN: CPT | Performed by: NURSE PRACTITIONER

## 2018-11-26 RX ORDER — ARIPIPRAZOLE 2 MG/1
2 TABLET ORAL DAILY
Qty: 30 TABLET | Refills: 1 | Status: SHIPPED | OUTPATIENT
Start: 2018-11-26 | End: 2019-02-26 | Stop reason: RX

## 2018-11-26 RX ORDER — ONDANSETRON 4 MG/1
4 TABLET, FILM COATED ORAL 2 TIMES DAILY
Qty: 20 TABLET | Refills: 0 | Status: SHIPPED | OUTPATIENT
Start: 2018-11-26 | End: 2019-07-12 | Stop reason: ALTCHOICE

## 2018-11-26 RX ORDER — FLUOXETINE HYDROCHLORIDE 40 MG/1
CAPSULE ORAL
Qty: 90 CAPSULE | Refills: 1 | Status: SHIPPED | OUTPATIENT
Start: 2018-11-26 | End: 2019-05-27 | Stop reason: SDUPTHER

## 2018-11-26 ASSESSMENT — ENCOUNTER SYMPTOMS
VOMITING: 1
WHEEZING: 0
NAUSEA: 1
COUGH: 0
CONSTIPATION: 0
DIARRHEA: 1
SHORTNESS OF BREATH: 0
RESPIRATORY NEGATIVE: 1
ABDOMINAL PAIN: 0

## 2018-11-26 ASSESSMENT — PATIENT HEALTH QUESTIONNAIRE - PHQ9
SUM OF ALL RESPONSES TO PHQ QUESTIONS 1-9: 2
SUM OF ALL RESPONSES TO PHQ9 QUESTIONS 1 & 2: 2
SUM OF ALL RESPONSES TO PHQ QUESTIONS 1-9: 2
2. FEELING DOWN, DEPRESSED OR HOPELESS: 1
1. LITTLE INTEREST OR PLEASURE IN DOING THINGS: 1

## 2018-11-26 NOTE — PROGRESS NOTES
/60 (Site: Left Upper Arm, Position: Sitting, Cuff Size: Large Adult)   Pulse 58   Temp 98 °F (36.7 °C) (Oral)   Wt 212 lb (96.2 kg)   LMP 09/24/2018   SpO2 100%   Breastfeeding? No   BMI 40.06 kg/m²   Physical Exam   Constitutional: She is oriented to person, place, and time. She appears well-developed and well-nourished. obesity   HENT:   Head: Normocephalic. Eyes: Conjunctivae are normal.   Cardiovascular: Normal rate and regular rhythm. Pulmonary/Chest: Effort normal and breath sounds normal.   Neurological: She is alert and oriented to person, place, and time. Skin: Skin is warm and dry. Psychiatric: Her behavior is normal. Judgment and thought content normal. She exhibits a depressed mood. Assessment:      1. Anxiety and depression    2. Mild episode of recurrent major depressive disorder ([facility] [address]    3. Mood swing               Quality & Risk Score Accuracy    Last edited 11/26/18 16:49 EST by Calixto Anderson, APRN - CNP           Plan:      No orders of the defined types were placed in this encounter. 1. Anxiety and depression    - ARIPiprazole (ABILIFY) 2 MG tablet; Take 1 tablet by mouth daily  Dispense: 30 tablet; Refill: 1    2. Mild episode of recurrent major depressive disorder (HCC)  Increase to 40 mg daily.   - FLUoxetine (PROZAC) 40 MG capsule; TAKE ONE CAPSULE BY MOUTH DAILY  Dispense: 90 capsule; Refill: 1    3. Mood swing  Will send for the abilify again. She needs this for a mood stabilizer. She is doing well anxiety related. She is having mood instability. Treating her depression is not what we are looking for. - ARIPiprazole (ABILIFY) 2 MG tablet; Take 1 tablet by mouth daily  Dispense: 30 tablet; Refill: 1    4. Acute gastroenteritis  hydrate  - ondansetron (ZOFRAN) 4 MG tablet; Take 1 tablet by mouth 2 times daily  Dispense: 20 tablet; Refill: 0        Return in about 3 months (around 2/26/2019) for depression/anxiety.              Patient given educational

## 2018-11-26 NOTE — PATIENT INSTRUCTIONS
Take part in a Sabianist activity or other social gathering. Go to a [email or web address] game. · Ask a friend to have dinner with you. Take care of yourself  · Eat a balanced diet with plenty of fresh fruits and vegetables, whole grains, and lean protein. If you have lost your appetite, eat small snacks rather than large meals. · Avoid drinking alcohol or using illegal drugs. Do not take medicines that have not been prescribed for you. They may interfere with medicines you may be taking for depression, or they may make your depression worse. · Take your medicines exactly as they are prescribed. You may start to feel better within 1 to 3 weeks of taking antidepressant medicine. But it can take as many as 6 to 8 weeks to see more improvement. If you have questions or concerns about your medicines, or if you do not notice any improvement by 3 weeks, talk to your doctor. · If you have any side effects from your medicine, tell your doctor. Antidepressants can make you feel tired, dizzy, or nervous. Some people have dry mouth, constipation, headaches, sexual problems, or diarrhea. Many of these side effects are mild and will go away on their own after you have been taking the medicine for a few weeks. Some may last longer. Talk to your doctor if side effects are bothering you too much. You might be able to try a different medicine. · Get enough sleep. If you have problems sleeping:  ? Go to bed at the same time every night, and get up at the same time every morning. ? Keep your bedroom dark and quiet. ? Do not exercise after 5:00 p.m.  ? Avoid drinks with caffeine after 5:00 p.m. · Avoid sleeping pills unless they are prescribed by the doctor treating your depression. Sleeping pills may make you groggy during the day, and they may interact with other medicine you are taking. · If you have any other illnesses, such as diabetes, heart disease, or high blood pressure, make sure to continue with your treatment.  Tell your doctor about

## 2018-11-27 PROBLEM — F32.A DEPRESSION: Status: ACTIVE | Noted: 2018-11-27

## 2019-02-26 ENCOUNTER — OFFICE VISIT (OUTPATIENT)
Dept: FAMILY MEDICINE CLINIC | Age: 24
End: 2019-02-26
Payer: COMMERCIAL

## 2019-02-26 VITALS
DIASTOLIC BLOOD PRESSURE: 70 MMHG | TEMPERATURE: 98 F | WEIGHT: 199 LBS | HEART RATE: 54 BPM | BODY MASS INDEX: 37.6 KG/M2 | SYSTOLIC BLOOD PRESSURE: 122 MMHG | OXYGEN SATURATION: 97 %

## 2019-02-26 DIAGNOSIS — F34.1 DYSTHYMIA: Primary | ICD-10-CM

## 2019-02-26 DIAGNOSIS — K30 ACID INDIGESTION: ICD-10-CM

## 2019-02-26 DIAGNOSIS — E66.9 CLASS 2 OBESITY WITHOUT SERIOUS COMORBIDITY WITH BODY MASS INDEX (BMI) OF 37.0 TO 37.9 IN ADULT, UNSPECIFIED OBESITY TYPE: ICD-10-CM

## 2019-02-26 PROCEDURE — G8417 CALC BMI ABV UP PARAM F/U: HCPCS | Performed by: NURSE PRACTITIONER

## 2019-02-26 PROCEDURE — G8427 DOCREV CUR MEDS BY ELIG CLIN: HCPCS | Performed by: NURSE PRACTITIONER

## 2019-02-26 PROCEDURE — 1036F TOBACCO NON-USER: CPT | Performed by: NURSE PRACTITIONER

## 2019-02-26 PROCEDURE — G8482 FLU IMMUNIZE ORDER/ADMIN: HCPCS | Performed by: NURSE PRACTITIONER

## 2019-02-26 PROCEDURE — 99213 OFFICE O/P EST LOW 20 MIN: CPT | Performed by: NURSE PRACTITIONER

## 2019-02-26 ASSESSMENT — PATIENT HEALTH QUESTIONNAIRE - PHQ9
SUM OF ALL RESPONSES TO PHQ9 QUESTIONS 1 & 2: 2
2. FEELING DOWN, DEPRESSED OR HOPELESS: 1
1. LITTLE INTEREST OR PLEASURE IN DOING THINGS: 1
SUM OF ALL RESPONSES TO PHQ QUESTIONS 1-9: 2
SUM OF ALL RESPONSES TO PHQ QUESTIONS 1-9: 2

## 2019-02-26 ASSESSMENT — ENCOUNTER SYMPTOMS
ABDOMINAL PAIN: 1
COUGH: 0
RESPIRATORY NEGATIVE: 1
CONSTIPATION: 0
WHEEZING: 0
SHORTNESS OF BREATH: 0
VOMITING: 1
NAUSEA: 1
DIARRHEA: 0

## 2019-03-30 DIAGNOSIS — R45.86 MOOD SWINGS: ICD-10-CM

## 2019-04-01 RX ORDER — TOPIRAMATE 50 MG/1
TABLET, FILM COATED ORAL
Qty: 90 TABLET | Refills: 1 | Status: SHIPPED | OUTPATIENT
Start: 2019-04-01 | End: 2019-06-25 | Stop reason: SINTOL

## 2019-04-03 ENCOUNTER — OFFICE VISIT (OUTPATIENT)
Dept: FAMILY MEDICINE CLINIC | Age: 24
End: 2019-04-03
Payer: COMMERCIAL

## 2019-04-03 ENCOUNTER — HOSPITAL ENCOUNTER (OUTPATIENT)
Age: 24
Setting detail: SPECIMEN
Discharge: HOME | End: 2019-04-03
Payer: COMMERCIAL

## 2019-04-03 VITALS
WEIGHT: 196 LBS | SYSTOLIC BLOOD PRESSURE: 120 MMHG | BODY MASS INDEX: 37.03 KG/M2 | DIASTOLIC BLOOD PRESSURE: 70 MMHG | HEART RATE: 70 BPM | OXYGEN SATURATION: 98 % | TEMPERATURE: 98.1 F

## 2019-04-03 DIAGNOSIS — N89.8 VAGINAL DISCHARGE: ICD-10-CM

## 2019-04-03 DIAGNOSIS — Z20.2 POSSIBLE EXPOSURE TO STD: ICD-10-CM

## 2019-04-03 DIAGNOSIS — R10.2 PELVIC PAIN: Primary | ICD-10-CM

## 2019-04-03 LAB
DIRECT EXAM: NORMAL
Lab: NORMAL
SPECIMEN DESCRIPTION: NORMAL

## 2019-04-03 PROCEDURE — 99213 OFFICE O/P EST LOW 20 MIN: CPT | Performed by: NURSE PRACTITIONER

## 2019-04-03 PROCEDURE — G8427 DOCREV CUR MEDS BY ELIG CLIN: HCPCS | Performed by: NURSE PRACTITIONER

## 2019-04-03 PROCEDURE — G8417 CALC BMI ABV UP PARAM F/U: HCPCS | Performed by: NURSE PRACTITIONER

## 2019-04-03 PROCEDURE — 1036F TOBACCO NON-USER: CPT | Performed by: NURSE PRACTITIONER

## 2019-04-03 ASSESSMENT — PATIENT HEALTH QUESTIONNAIRE - PHQ9
SUM OF ALL RESPONSES TO PHQ QUESTIONS 1-9: 0
SUM OF ALL RESPONSES TO PHQ QUESTIONS 1-9: 0
1. LITTLE INTEREST OR PLEASURE IN DOING THINGS: 0
2. FEELING DOWN, DEPRESSED OR HOPELESS: 0
SUM OF ALL RESPONSES TO PHQ9 QUESTIONS 1 & 2: 0

## 2019-04-03 ASSESSMENT — ENCOUNTER SYMPTOMS
SHORTNESS OF BREATH: 0
RESPIRATORY NEGATIVE: 1
COUGH: 0

## 2019-04-03 NOTE — PROGRESS NOTES
Patient is present complaining of a pelvic pain. Patient had sex last week and condom broke. Patient is unsure if the condom is completely out or not. Patient found out that sexual partner has multiple sexual partners. Patiens pain level is a 2/10. Pain is dull. Medications and pharmacy reviewed with patient. Visit Information    Have you changed or started any medications since your last visit including any over-the-counter medicines, vitamins, or herbal medicines? no   Have you stopped taking any of your medications? Is so, why? -  no  Are you having any side effects from any of your medications? - no    Have you seen any other physician or provider since your last visit?  no   Have you had any other diagnostic tests since your last visit?  no   Have you been seen in the emergency room and/or had an admission in a hospital since we last saw you?  no   Have you had your routine dental cleaning in the past 6 months?  yes -      Do you have an active [employer] account? If no, what is the barrier?   Yes    Patient Care Team:  LUCY Wagner CNP as PCP - General (Certified Nurse Practitioner)  LUCY Wagner CNP as PCP - S Attributed Provider  Chandler Ruiz DO as Consulting Physician (Obstetrics & Gynecology)  Rodney Schmitt MD as Consulting Physician (Internal Medicine Cardiovascular Disease)  Chandler Ruiz DO as Consulting Physician (Obstetrics & Gynecology)    Medical History Review  Past Medical, Family, and Social History reviewed and does contribute to the patient presenting condition    Health Maintenance   Topic Date Due    Varicella Vaccine (1 of 2 - 13+ 2-dose series) 05/29/2007    HPV vaccine (1 - Female 3-dose series) 05/29/2009    Cervical cancer screen  09/24/2019    Chlamydia screen  09/24/2019    DTaP/Tdap/Td vaccine (2 - Tdap) 05/04/2024    Flu vaccine  Completed    HIV screen  Completed

## 2019-04-03 NOTE — PROGRESS NOTES
[facility] & [address] [place] [address]  Dept: [phone]  Dept Fax: [phone]      Jasmin Hernandez is a 24 y.o. female who presents today for hermedical conditions/complaints as noted below. Jasmin Hernandez is c/o of Pelvic Pain and Exposure to STD            HPI:      ZEENAT Tuttle is here today after having sex with a new partner. He admitted to having multiple partners. The condom broke during intercourse. She is having some pelvic cramping. She is not having any odor, does have some itching and discharge.        Hemoglobin A1C (%)   Date Value   2014 5.2   2014 5.2             ( goal A1Cis < 7)   No results found for: LABMICR  LDL Cholesterol (mg/dL)   Date Value   10/02/2018 131 (H)   2017 108   2015 110       (goal LDL is <100)   AST (U/L)   Date Value   10/02/2018 22     ALT (U/L)   Date Value   10/02/2018 27     BUN (mg/dL)   Date Value   10/02/2018 14     BP Readings from Last 3 Encounters:   19 120/70   19 122/70   18 110/60          (goal 120/80)    Past Medical History:   Diagnosis Date    Anxiety and depression 2016    Anxiety and depression     Depression 2018    Heart murmur     History of  section     Obesity 2015      Past Surgical History:   Procedure Laterality Date     SECTION, LOW TRANSVERSE  3/17/14    Arrest of dilation    TOOTH EXTRACTION         Family History   Problem Relation Age of Onset    Hypertension Maternal Grandmother     Arthritis Maternal Grandmother     Lupus Maternal Grandmother     Osteoporosis Maternal Grandmother     High Blood Pressure Father     Breast Cancer Neg Hx     Cancer Neg Hx     Colon Cancer Neg Hx     Diabetes Neg Hx     Eclampsia Neg Hx     Ovarian Cancer Neg Hx      Labor Neg Hx     Spont Abortions Neg Hx     Stroke Neg Hx           Social History     Tobacco Use    Smoking status: Never Smoker    Smokeless tobacco: Never Used   Substance Use Topics    Alcohol use: No         Current Outpatient Medications   Medication Sig Dispense Refill    topiramate (TOPAMAX) 50 MG tablet TAKE ONE TABLET BY MOUTH ONCE NIGHTLY 90 tablet 1    FLUoxetine (PROZAC) 40 MG capsule TAKE ONE CAPSULE BY MOUTH DAILY 90 capsule 1    ondansetron (ZOFRAN) 4 MG tablet Take 1 tablet by mouth 2 times daily 20 tablet 0    ranitidine (ZANTAC) 150 MG tablet TAKE ONE TABLET BY MOUTH EVERY EVENING 90 tablet 1    Levonorgest-Eth Estrad 91-Day (SEASONIQUE) 0.15-0.03 &0.01 MG TABS Take 1 Package by mouth daily Barrier contraception is recommended. 91 tablet 4     MG tablet        No current facility-administered medications for this visit. No Known Allergies    Health Maintenance   Topic Date Due    Varicella Vaccine (1 of 2 - 13+ 2-dose series) 05/29/2007    HPV vaccine (1 - Female 3-dose series) 05/29/2009    Cervical cancer screen  09/24/2019    Chlamydia screen  09/24/2019    DTaP/Tdap/Td vaccine (2 - Tdap) 05/04/2024    Flu vaccine  Completed    HIV screen  Completed          Review of Systems   Constitutional: Negative. HENT: Negative. Respiratory: Negative. Negative for cough and shortness of breath. Cardiovascular: Negative. Negative for chest pain and palpitations. Genitourinary: Positive for pelvic pain and vaginal discharge. Negative for vaginal pain. Musculoskeletal: Negative. Negative for arthralgias. Skin: Negative. Neurological: Negative. Psychiatric/Behavioral: Negative. Objective:     /70 (Site: Left Upper Arm, Position: Sitting, Cuff Size: Large Adult)   Pulse 70   Temp 98.1 °F (36.7 °C) (Oral)   Wt 196 lb (88.9 kg)   SpO2 98%   Breastfeeding? No   BMI 37.03 kg/m²   Physical Exam   Constitutional: She is oriented to person, place, and time. She appears well-developed and well-nourished. HENT:   Head: Normocephalic.    Eyes: Conjunctivae are normal. Cardiovascular: Normal rate and regular rhythm. Pulmonary/Chest: Effort normal and breath sounds normal.   Genitourinary: Cervix exhibits discharge and friability. Vaginal discharge found. Genitourinary Comments: No foreign objects noted   Neurological: She is alert and oriented to person, place, and time. Skin: Skin is warm and dry. Psychiatric: She has a normal mood and affect. Her behavior is normal. Judgment and thought content normal.         Assessment:      1. Pelvic pain    2. Possible exposure to STD    3. Vaginal discharge               Quality & Risk Score Accuracy    Last edited 04/03/19 12:46 EDT by LUCY Wright CNP           Plan:      Orders Placed This Encounter   Procedures    VAGINITIS DNA PROBE     Standing Status:   Future     Number of Occurrences:   1     Standing Expiration Date:   4/2/2020    C.trachomatis N.gonorrhoeae DNA     Standing Status:   Future     Number of Occurrences:   1     Standing Expiration Date:   4/2/2020       No follow-ups on file. 1. Pelvic pain  2. Possible exposure to STD  3. Vaginal discharge  - VAGINITIS DNA PROBE; Future  - C.trachomatis N.gonorrhoeae DNA; Future         Patient given educational materials - see patientinstructions. Discussed use, benefit, and side effects of prescribed medications. All patient questions answered. Pt voiced understanding. Reviewed health maintenance. Instructed to continue current medications, diet and exercise. Patient agreedwith treatment plan. Follow up as directed.      Electronically signed by LUCY Wright CNP on 4/3/2019

## 2019-04-03 NOTE — PATIENT INSTRUCTIONS
Patient Education        Safer Sex: Care Instructions  Your Care Instructions  Safer sex is a way to reduce your risk of getting an infection spread through sex. It can also help prevent pregnancy. Most infections that are spread through sex, also called sexually transmitted infections or STIs, can be cured. But some can decrease your chances of getting pregnant if they are not treated early. Others, such as herpes, have no cure. And some, such as HIV, can be deadly. Several products can help you practice safer sex and reduce your chance of STIs. One of the best is a condom. There are condoms for men and for women. The female condom is a tube of soft plastic with a closed end that is placed deep into the vagina. You can use a special rubber sheet (dental dam) for protection during oral sex. Latex gloves can keep your hands from touching blood, semen, or other body fluids that can carry infections. Remember that birth control methods such as diaphragms, IUDs, foams, and birth control pills do not stop you from getting STIs. Follow-up care is a key part of your treatment and safety. Be sure to make and go to all appointments, and call your doctor if you are having problems. It's also a good idea to know your test results and keep a list of the medicines you take. How can you care for yourself at home? · Think about getting shots to prevent hepatitis A and hepatitis B. These two diseases can be spread through sex. You also can get hepatitis A if you eat infected food. · Use condoms or female condoms each time and every time you have sex. · Learn the right way to use a male condom:  ? Condoms come in several sizes. Make sure you use the right size. A condom that is too small can break easily. A condom that is too big can slip off during sex. Use a new condom each time you have sex. ? Be careful not to poke a hole in the condom when you open the wrapper. ? Squeeze the tip of the condom to keep out air.   ? Pull down the loose skin (foreskin) from the head of an uncircumcised penis. ? While squeezing the tip of the condom, unroll it all the way down to the base of the firm penis. ? Never use petroleum jelly (such as Vaseline), grease, hand lotion, baby oil, or anything with oil in it. These products can make holes in the condom. ? After sex, hold the condom on your penis as you remove your penis from your partner. This will keep semen from spilling out of the condom. · Learn to use a female condom:  ? You can put in a female condom up to 8 hours before sex. ? Squeeze the smaller ring at the closed end and insert it deep into the vagina. The larger ring at the open end should stay outside the vagina. ? During sex, make sure the penis goes into the condom. ? After the penis is removed, close the open end of the condom by twisting it. Remove the condom. · Do not use a female condom and male condom at the same time. · Do not have sex with anyone who has symptoms of an STI, such as sores on the genitals or mouth. The herpes virus that causes cold sores can spread to and from the penis and vagina. · Do not drink a lot of alcohol or use drugs before sex. This can cause you to let down your guard and not practice safer sex. · Having one sex partner (who does not have STIs and does not have sex with anyone else) is a sure way to avoid STIs. · Talk to your partner before you have sex. Find out if he or she has or is at risk for any STI. Keep in mind that a person may be able to spread an STI even if he or she does not have symptoms. You and your partner may want to get an HIV test. You should get tested again 6 months later. Where can you learn more? Go to https://[employer]. org and sign in to your [employer] account. Enter Y085 in the Business [employer] box to learn more about \"Safer Sex: Care Instructions. \"     If you do not have an account, please click on the \"Sign Up Now\" link.   Current as of:

## 2019-04-04 LAB
C TRACH DNA GENITAL QL NAA+PROBE: NEGATIVE
N. GONORRHOEAE DNA: NEGATIVE

## 2019-04-30 NOTE — TELEPHONE ENCOUNTER
Last visit: 4/3/19  Last Med refill: 10/15/18  Does patient have enough medication for 72 hours: No:     Next Visit Date:  Future Appointments   Date Time Provider Sangita Olmos   2019  8:40 AM LUCY Ramos - CNP [facility] [address] Maintenance   Topic Date Due    Varicella Vaccine (1 of 2 - 13+ 2-dose series) 2007    HPV vaccine (1 - Female 3-dose series) 2009    Cervical cancer screen  2019    Chlamydia screen  2020    DTaP/Tdap/Td vaccine (2 - Tdap) 2024    Flu vaccine  Completed    HIV screen  Completed    Pneumococcal 0-64 years Vaccine  Aged Out       Hemoglobin A1C (%)   Date Value   2014 5.2   2014 5.2             ( goal A1C is < 7)   No results found for: LABMICR  LDL Cholesterol (mg/dL)   Date Value   10/02/2018 131 (H)   2017 108       (goal LDL is <100)   AST (U/L)   Date Value   10/02/2018 22     ALT (U/L)   Date Value   10/02/2018 27     BUN (mg/dL)   Date Value   10/02/2018 14     BP Readings from Last 3 Encounters:   19 120/70   19 122/70   18 110/60          (goal 120/80)    All Future Testing planned in [facility]  Lab Frequency Next Occurrence               Patient Active Problem List:     Heart murmur     History of  section     Obesity     Right knee pain     Heart murmur, systolic     Pulmonary HTN (HCC)     Anxiety and depression     Migraine without aura and without status migrainosus, not intractable     Onychomadesis of toenail     Mood swing     Depression     Acid indigestion

## 2019-05-01 RX ORDER — RANITIDINE 150 MG/1
TABLET ORAL
Qty: 90 TABLET | Refills: 1 | Status: SHIPPED | OUTPATIENT
Start: 2019-05-01 | End: 2019-06-25 | Stop reason: ALTCHOICE

## 2019-05-27 DIAGNOSIS — F33.0 MILD EPISODE OF RECURRENT MAJOR DEPRESSIVE DISORDER (HCC): ICD-10-CM

## 2019-05-28 RX ORDER — FLUOXETINE HYDROCHLORIDE 40 MG/1
CAPSULE ORAL
Qty: 90 CAPSULE | Refills: 0 | Status: SHIPPED | OUTPATIENT
Start: 2019-05-28 | End: 2019-06-25 | Stop reason: SDUPTHER

## 2019-05-28 NOTE — TELEPHONE ENCOUNTER
Last Visit:4/3/19  Next Visit Date:  Future Appointments   Date Time Provider Sangita Olmos   2019  8:40 AM LUCY Young - CNP [facility] [address] Maintenance   Topic Date Due    Varicella Vaccine (1 of 2 - 13+ 2-dose series) 2007    HPV vaccine (1 - Female 3-dose series) 2009    Cervical cancer screen  2019    Chlamydia screen  2020    DTaP/Tdap/Td vaccine (2 - Tdap) 2024    Flu vaccine  Completed    HIV screen  Completed    Pneumococcal 0-64 years Vaccine  Aged Out       Hemoglobin A1C (%)   Date Value   2014 5.2   2014 5.2             ( goal A1C is < 7)   No results found for: LABMICR  LDL Cholesterol (mg/dL)   Date Value   10/02/2018 131 (H)   2017 108       (goal LDL is <100)   AST (U/L)   Date Value   10/02/2018 22     ALT (U/L)   Date Value   10/02/2018 27     BUN (mg/dL)   Date Value   10/02/2018 14     BP Readings from Last 3 Encounters:   19 120/70   19 122/70   18 110/60          (goal 120/80)    All Future Testing planned in [facility]  Lab Frequency Next Occurrence               Patient Active Problem List:     Heart murmur     History of  section     Obesity     Right knee pain     Heart murmur, systolic     Pulmonary HTN (HCC)     Anxiety and depression     Migraine without aura and without status migrainosus, not intractable     Onychomadesis of toenail     Mood swing     Depression     Acid indigestion

## 2019-06-13 ENCOUNTER — TELEPHONE (OUTPATIENT)
Dept: FAMILY MEDICINE CLINIC | Age: 24
End: 2019-06-13

## 2019-06-13 DIAGNOSIS — Z13.220 SCREENING FOR HYPERLIPIDEMIA: Primary | ICD-10-CM

## 2019-06-13 DIAGNOSIS — R53.83 FATIGUE, UNSPECIFIED TYPE: ICD-10-CM

## 2019-06-19 DIAGNOSIS — Z13.220 SCREENING FOR HYPERLIPIDEMIA: ICD-10-CM

## 2019-06-19 DIAGNOSIS — R53.83 FATIGUE, UNSPECIFIED TYPE: ICD-10-CM

## 2019-06-25 ENCOUNTER — OFFICE VISIT (OUTPATIENT)
Dept: FAMILY MEDICINE CLINIC | Age: 24
End: 2019-06-25
Payer: COMMERCIAL

## 2019-06-25 VITALS
BODY MASS INDEX: 36.66 KG/M2 | OXYGEN SATURATION: 98 % | WEIGHT: 194 LBS | HEART RATE: 61 BPM | DIASTOLIC BLOOD PRESSURE: 80 MMHG | SYSTOLIC BLOOD PRESSURE: 124 MMHG

## 2019-06-25 DIAGNOSIS — B96.89 BACTERIAL VAGINOSIS: ICD-10-CM

## 2019-06-25 DIAGNOSIS — L65.9 HAIR LOSS: Primary | ICD-10-CM

## 2019-06-25 DIAGNOSIS — N30.00 ACUTE CYSTITIS WITHOUT HEMATURIA: ICD-10-CM

## 2019-06-25 DIAGNOSIS — N76.0 BACTERIAL VAGINOSIS: ICD-10-CM

## 2019-06-25 DIAGNOSIS — F33.0 MILD EPISODE OF RECURRENT MAJOR DEPRESSIVE DISORDER (HCC): ICD-10-CM

## 2019-06-25 PROCEDURE — G8427 DOCREV CUR MEDS BY ELIG CLIN: HCPCS | Performed by: NURSE PRACTITIONER

## 2019-06-25 PROCEDURE — 99214 OFFICE O/P EST MOD 30 MIN: CPT | Performed by: NURSE PRACTITIONER

## 2019-06-25 PROCEDURE — 1036F TOBACCO NON-USER: CPT | Performed by: NURSE PRACTITIONER

## 2019-06-25 PROCEDURE — G8417 CALC BMI ABV UP PARAM F/U: HCPCS | Performed by: NURSE PRACTITIONER

## 2019-06-25 RX ORDER — FLUOXETINE HYDROCHLORIDE 40 MG/1
40 CAPSULE ORAL DAILY
Qty: 90 CAPSULE | Refills: 1 | Status: SHIPPED | OUTPATIENT
Start: 2019-06-25 | End: 2020-02-28

## 2019-06-25 RX ORDER — LANOLIN ALCOHOL/MO/W.PET/CERES
1000 CREAM (GRAM) TOPICAL DAILY
COMMUNITY
End: 2020-11-16

## 2019-06-25 RX ORDER — METRONIDAZOLE 7.5 MG/G
GEL VAGINAL
Qty: 25 G | Refills: 0 | Status: SHIPPED | OUTPATIENT
Start: 2019-06-25 | End: 2019-07-12 | Stop reason: ALTCHOICE

## 2019-06-25 RX ORDER — CIPROFLOXACIN 250 MG/1
250 TABLET, FILM COATED ORAL 2 TIMES DAILY
Qty: 6 TABLET | Refills: 0 | Status: SHIPPED | OUTPATIENT
Start: 2019-06-25 | End: 2019-06-28

## 2019-06-25 ASSESSMENT — ENCOUNTER SYMPTOMS
COUGH: 0
RESPIRATORY NEGATIVE: 1

## 2019-06-25 NOTE — PROGRESS NOTES
[address] [facility] [address]  Dept: [phone]  Dept Fax: [phone]      Jasmin Hernandez is a 21 y.o. female who presents today for hermedical conditions/complaints as noted below. Jasmin Hernandez is c/o of Other (follow up depression, anxiety)            HPI:      ZEENAT Tuttle is here today for follow up on depression and anxiety. She is feeling good. Mood is stable. Migraine-she stopped the topamax due to it making her feel like a zombie. She did not feel that it made any difference with the migraines either. She is using ibu and excedrin and this is working for her. She is stalled in her wt loss. She is working out at a cross fit facility. She is eating low carb. she is having a cheat day. . She is positive for a UTI. She has been dipping her urine at work and it was negative. She finally sent one out and does have UTI. She is also positive for BV. Is having a noted odor. She does not note discharge.    Hemoglobin A1C (%)   Date Value   2014 5.2   2014 5.2             ( goal A1Cis < 7)   No results found for: LABMICR  LDL Cholesterol (mg/dL)   Date Value   10/02/2018 131 (H)   2017 108   2015 110       (goal LDL is <100)   AST (U/L)   Date Value   10/02/2018 22     ALT (U/L)   Date Value   10/02/2018 27     BUN (mg/dL)   Date Value   10/02/2018 14     BP Readings from Last 3 Encounters:   19 124/80   19 120/70   19 122/70          (goal 120/80)    Past Medical History:   Diagnosis Date    Anxiety and depression 2016    Anxiety and depression     Depression 2018    Heart murmur     History of  section     Obesity 2015      Past Surgical History:   Procedure Laterality Date     SECTION, LOW TRANSVERSE  3/17/14    Arrest of dilation    TOOTH EXTRACTION         Family History   Problem Relation Age of Onset    Hypertension Maternal Grandmother     Arthritis Maternal Grandmother     Lupus Maternal Grandmother     Osteoporosis Maternal Grandmother     High Blood Pressure Father     Breast Cancer Neg Hx     Cancer Neg Hx     Colon Cancer Neg Hx     Diabetes Neg Hx     Eclampsia Neg Hx     Ovarian Cancer Neg Hx      Labor Neg Hx     Spont Abortions Neg Hx     Stroke Neg Hx           Social History     Tobacco Use    Smoking status: Never Smoker    Smokeless tobacco: Never Used   Substance Use Topics    Alcohol use: No         Current Outpatient Medications   Medication Sig Dispense Refill    Biotin 5000 MCG CAPS Take by mouth daily      vitamin B-12 (CYANOCOBALAMIN) 1000 MCG tablet Take 1,000 mcg by mouth daily      Cholecalciferol (VITAMIN D3) 5000 units TABS Take by mouth every other day      metroNIDAZOLE (METROGEL VAGINAL) 0.75 % vaginal gel Place vaginally nightly for 5 nights 25 g 0    ciprofloxacin (CIPRO) 250 MG tablet Take 1 tablet by mouth 2 times daily for 3 days 6 tablet 0    FLUoxetine (PROZAC) 40 MG capsule Take 1 capsule by mouth daily 90 capsule 1    Levonorgest-Eth Estrad 91-Day (SEASONIQUE) 0.15-0.03 &0.01 MG TABS Take 1 Package by mouth daily Barrier contraception is recommended. 91 tablet 4     MG tablet       ondansetron (ZOFRAN) 4 MG tablet Take 1 tablet by mouth 2 times daily 20 tablet 0     No current facility-administered medications for this visit. No Known Allergies    Health Maintenance   Topic Date Due    Varicella Vaccine (1 of 2 - 13+ 2-dose series) 2007    HPV vaccine (1 - Female 3-dose series) 2009    Cervical cancer screen  2019    Chlamydia screen  2020    DTaP/Tdap/Td vaccine (2 - Tdap) 2024    Flu vaccine  Completed    HIV screen  Completed    Pneumococcal 0-64 years Vaccine  Aged Out          Review of Systems   Constitutional: Negative. HENT: Negative. Respiratory: Negative. Negative for cough. Cardiovascular: Negative.   Negative for chest pain and palpitations. Endocrine:        Hair loss     Genitourinary: Positive for dysuria and frequency. Musculoskeletal: Negative. Negative for arthralgias. Skin: Negative. Neurological: Positive for headaches. Psychiatric/Behavioral: Positive for dysphoric mood. Objective:     /80 (Site: Left Upper Arm, Position: Sitting, Cuff Size: Medium Adult)   Pulse 61   Wt 194 lb (88 kg)   SpO2 98%   BMI 36.66 kg/m²   Physical Exam   Constitutional: She is oriented to person, place, and time. She appears well-developed and well-nourished. HENT:   Head: Normocephalic. Eyes: Conjunctivae are normal.   Cardiovascular: Normal rate and regular rhythm. Pulmonary/Chest: Effort normal and breath sounds normal.   Neurological: She is alert and oriented to person, place, and time. Skin: Skin is warm and dry. Psychiatric: She has a normal mood and affect. Her behavior is normal. Judgment and thought content normal.         Assessment:      1. Hair loss    2. Mild episode of recurrent major depressive disorder ([facility] [address]    3. Acute cystitis without hematuria    4. Bacterial vaginosis               Quality & Risk Score Accuracy    Last edited 06/25/19 09:43 EDT by LUCY Sumner - CNP           Plan:     1. Mild episode of recurrent major depressive disorder (HCC)    - FLUoxetine (PROZAC) 40 MG capsule; Take 1 capsule by mouth daily  Dispense: 90 capsule; Refill: 1    2. Hair loss  Thyroid was normal. She believes it may be her OCP. Will talk with Bea Prader to get them change. 3. Acute cystitis without hematuria    - ciprofloxacin (CIPRO) 250 MG tablet; Take 1 tablet by mouth 2 times daily for 3 days  Dispense: 6 tablet; Refill: 0    4.  Bacterial vaginosis    - metroNIDAZOLE (METROGEL VAGINAL) 0.75 % vaginal gel; Place vaginally nightly for 5 nights  Dispense: 25 g; Refill: 0  Encouraged to start a daily probiotic to help with this reoccurring issue    No orders of the defined

## 2019-06-29 ENCOUNTER — HOSPITAL ENCOUNTER (EMERGENCY)
Age: 24
Discharge: HOME | End: 2019-06-29
Attending: EMERGENCY MEDICINE
Payer: OTHER MISCELLANEOUS

## 2019-06-29 ENCOUNTER — APPOINTMENT (OUTPATIENT)
Dept: CT IMAGING | Age: 24
End: 2019-06-29
Payer: OTHER MISCELLANEOUS

## 2019-06-29 ENCOUNTER — APPOINTMENT (OUTPATIENT)
Dept: GENERAL RADIOLOGY | Age: 24
End: 2019-06-29
Payer: OTHER MISCELLANEOUS

## 2019-06-29 VITALS
TEMPERATURE: 97.9 F | RESPIRATION RATE: 18 BRPM | SYSTOLIC BLOOD PRESSURE: 135 MMHG | HEART RATE: 61 BPM | HEIGHT: 61 IN | OXYGEN SATURATION: 98 % | BODY MASS INDEX: 37.38 KG/M2 | WEIGHT: 198 LBS | DIASTOLIC BLOOD PRESSURE: 73 MMHG
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
## 2019-06-29 DIAGNOSIS — S16.1XXA STRAIN OF NECK MUSCLE, INITIAL ENCOUNTER: ICD-10-CM

## 2019-06-29 DIAGNOSIS — S09.90XA CLOSED HEAD INJURY, INITIAL ENCOUNTER: ICD-10-CM

## 2019-06-29 LAB — HCG(URINE) PREGNANCY TEST: NEGATIVE

## 2019-06-29 PROCEDURE — 6370000000 HC RX 637 (ALT 250 FOR IP): Performed by: EMERGENCY MEDICINE

## 2019-06-29 PROCEDURE — 99284 EMERGENCY DEPT VISIT MOD MDM: CPT

## 2019-06-29 PROCEDURE — 70450 CT HEAD/BRAIN W/O DYE: CPT

## 2019-06-29 PROCEDURE — 81025 URINE PREGNANCY TEST: CPT

## 2019-06-29 PROCEDURE — 73590 X-RAY EXAM OF LOWER LEG: CPT

## 2019-06-29 PROCEDURE — 72125 CT NECK SPINE W/O DYE: CPT

## 2019-06-29 RX ORDER — CYCLOBENZAPRINE HCL 10 MG
10 TABLET ORAL ONCE
Status: COMPLETED | OUTPATIENT
Start: 2019-06-29 | End: 2019-06-29

## 2019-06-29 RX ORDER — CYCLOBENZAPRINE HCL 5 MG
5 TABLET ORAL 2 TIMES DAILY PRN
Qty: 30 TABLET | Refills: 0 | Status: SHIPPED | OUTPATIENT
Start: 2019-06-29 | End: 2019-07-09

## 2019-06-29 RX ADMIN — CYCLOBENZAPRINE HYDROCHLORIDE 10 MG: 10 TABLET, FILM COATED ORAL at 14:08

## 2019-06-29 ASSESSMENT — ENCOUNTER SYMPTOMS
COUGH: 0
VOMITING: 0
TROUBLE SWALLOWING: 0
WHEEZING: 0
DIARRHEA: 0
SORE THROAT: 0
SHORTNESS OF BREATH: 0
NAUSEA: 0
COLOR CHANGE: 0
EYE DISCHARGE: 0
FACIAL SWELLING: 0
CONSTIPATION: 0
EYE REDNESS: 0
SINUS PRESSURE: 0
EYE PAIN: 0
CHEST TIGHTNESS: 0
RHINORRHEA: 0
BACK PAIN: 0
ABDOMINAL PAIN: 0
BLOOD IN STOOL: 0

## 2019-06-29 ASSESSMENT — PAIN SCALES - GENERAL: PAINLEVEL_OUTOF10: 7
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
## 2019-07-09 ENCOUNTER — OFFICE VISIT (OUTPATIENT)
Dept: OBGYN CLINIC | Age: 24
End: 2019-07-09
Payer: COMMERCIAL

## 2019-07-09 VITALS
BODY MASS INDEX: 37 KG/M2 | WEIGHT: 196 LBS | DIASTOLIC BLOOD PRESSURE: 70 MMHG | SYSTOLIC BLOOD PRESSURE: 130 MMHG | HEART RATE: 66 BPM | HEIGHT: 61 IN

## 2019-07-09 DIAGNOSIS — Z30.09 FAMILY PLANNING EDUCATION, GUIDANCE, AND COUNSELING: Primary | ICD-10-CM

## 2019-07-09 PROCEDURE — 99212 OFFICE O/P EST SF 10 MIN: CPT | Performed by: NURSE PRACTITIONER

## 2019-07-09 PROCEDURE — G8427 DOCREV CUR MEDS BY ELIG CLIN: HCPCS | Performed by: NURSE PRACTITIONER

## 2019-07-09 PROCEDURE — 1036F TOBACCO NON-USER: CPT | Performed by: NURSE PRACTITIONER

## 2019-07-09 PROCEDURE — G8417 CALC BMI ABV UP PARAM F/U: HCPCS | Performed by: NURSE PRACTITIONER

## 2019-07-09 RX ORDER — NORETHINDRONE ACETATE AND ETHINYL ESTRADIOL 1MG-20(21)
1 KIT ORAL DAILY
Qty: 1 PACKET | Refills: 11 | Status: SHIPPED | OUTPATIENT
Start: 2019-07-09 | End: 2019-09-24 | Stop reason: SDUPTHER

## 2019-07-12 ENCOUNTER — OFFICE VISIT (OUTPATIENT)
Dept: FAMILY MEDICINE CLINIC | Age: 24
End: 2019-07-12
Payer: COMMERCIAL

## 2019-07-12 VITALS
SYSTOLIC BLOOD PRESSURE: 120 MMHG | TEMPERATURE: 97.8 F | DIASTOLIC BLOOD PRESSURE: 84 MMHG | OXYGEN SATURATION: 96 % | HEART RATE: 60 BPM | WEIGHT: 196.4 LBS | BODY MASS INDEX: 37.11 KG/M2
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
## 2019-07-12 PROCEDURE — G8427 DOCREV CUR MEDS BY ELIG CLIN: HCPCS | Performed by: NURSE PRACTITIONER

## 2019-07-12 PROCEDURE — 99213 OFFICE O/P EST LOW 20 MIN: CPT | Performed by: NURSE PRACTITIONER

## 2019-07-12 PROCEDURE — 1036F TOBACCO NON-USER: CPT | Performed by: NURSE PRACTITIONER

## 2019-07-12 PROCEDURE — G8417 CALC BMI ABV UP PARAM F/U: HCPCS | Performed by: NURSE PRACTITIONER

## 2019-07-12 ASSESSMENT — ENCOUNTER SYMPTOMS
SHORTNESS OF BREATH: 0
COUGH: 0
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
## 2019-09-24 ENCOUNTER — OFFICE VISIT (OUTPATIENT)
Dept: OBGYN CLINIC | Age: 24
End: 2019-09-24
Payer: COMMERCIAL

## 2019-09-24 ENCOUNTER — HOSPITAL ENCOUNTER (OUTPATIENT)
Age: 24
Setting detail: SPECIMEN
Discharge: HOME | End: 2019-09-24
Payer: COMMERCIAL

## 2019-09-24 VITALS
DIASTOLIC BLOOD PRESSURE: 82 MMHG | SYSTOLIC BLOOD PRESSURE: 120 MMHG | HEIGHT: 61 IN | WEIGHT: 204 LBS | BODY MASS INDEX: 38.51 KG/M2 | HEART RATE: 58 BPM | RESPIRATION RATE: 18 BRPM

## 2019-09-24 DIAGNOSIS — N89.8 VAGINAL ITCHING: ICD-10-CM

## 2019-09-24 DIAGNOSIS — Z11.51 SCREENING FOR HPV (HUMAN PAPILLOMAVIRUS): ICD-10-CM

## 2019-09-24 DIAGNOSIS — Z01.419 WELL WOMAN EXAM WITH ROUTINE GYNECOLOGICAL EXAM: ICD-10-CM

## 2019-09-24 DIAGNOSIS — Z01.419 WELL WOMAN EXAM WITH ROUTINE GYNECOLOGICAL EXAM: Primary | ICD-10-CM

## 2019-09-24 LAB
DIRECT EXAM: NORMAL
Lab: NORMAL
SPECIMEN DESCRIPTION: NORMAL

## 2019-09-24 PROCEDURE — 87591 N.GONORRHOEAE DNA AMP PROB: CPT

## 2019-09-24 PROCEDURE — 87660 TRICHOMONAS VAGIN DIR PROBE: CPT

## 2019-09-24 PROCEDURE — 87480 CANDIDA DNA DIR PROBE: CPT

## 2019-09-24 PROCEDURE — G0145 SCR C/V CYTO,THINLAYER,RESCR: HCPCS

## 2019-09-24 PROCEDURE — 87491 CHLMYD TRACH DNA AMP PROBE: CPT

## 2019-09-24 PROCEDURE — 87510 GARDNER VAG DNA DIR PROBE: CPT

## 2019-09-24 PROCEDURE — 99395 PREV VISIT EST AGE 18-39: CPT | Performed by: ADVANCED PRACTICE MIDWIFE

## 2019-09-24 RX ORDER — NORETHINDRONE ACETATE AND ETHINYL ESTRADIOL 1MG-20(21)
1 KIT ORAL DAILY
Qty: 1 PACKET | Refills: 11 | Status: SHIPPED | OUTPATIENT
Start: 2019-09-24 | End: 2020-08-03

## 2019-09-24 ASSESSMENT — PATIENT HEALTH QUESTIONNAIRE - PHQ9
SUM OF ALL RESPONSES TO PHQ QUESTIONS 1-9: 0
SUM OF ALL RESPONSES TO PHQ QUESTIONS 1-9: 0
SUM OF ALL RESPONSES TO PHQ9 QUESTIONS 1 & 2: 0
1. LITTLE INTEREST OR PLEASURE IN DOING THINGS: 0
2. FEELING DOWN, DEPRESSED OR HOPELESS: 0

## 2019-09-24 NOTE — PROGRESS NOTES
History and Physical  [address], [address] [facility] [address], [address] [facility] [place] [phone]   Fax [phone]  John Manzano  2019              21 y.o. Chief Complaint   Patient presents with    Annual Exam       Patient's last menstrual period was 2019. Primary Care Physician: LUCY Ren Caro, CNP    The patient was seen and examined. She has no chief complaint today and is here for her annual exam.  Her bowels are regular. There are no voiding complaints. She denies any bloating. She has vaginal discharge and was counseled on STD's and the need for barrier contraception.      HPI : John Manzano is a 21 y.o. female     GYN exam, desires to stay on BCP, desires all vaginal cultures  ________________________________________________________________________  OB History    Para Term  AB Living   1 1 1 0 0 1   SAB TAB Ectopic Molar Multiple Live Births   0 0 0 0 0 1      # Outcome Date GA Lbr Jas/2nd Weight Sex Delivery Anes PTL Lv   1 Term 14 38w4d  7 lb 15 oz (3.6 kg) M CS-LTranv Spinal N LUIS      Birth Comments: Meconium stained amniotic fluid, arrest of dilation, occiput posterior presentation      Apgar1: 8  Apgar5: 9     Past Medical History:   Diagnosis Date    Anxiety and depression 2016    Anxiety and depression     Depression 2018    Heart murmur     History of  section     Obesity 2015                                                                   Past Surgical History:   Procedure Laterality Date     SECTION, LOW TRANSVERSE  3/17/14    Arrest of dilation    TOOTH EXTRACTION       Family History   Problem Relation Age of Onset    Hypertension Maternal Grandmother     Arthritis Maternal Grandmother     Lupus Maternal Grandmother     Osteoporosis Maternal Grandmother     High Blood Pressure Father     Breast Cancer Neg Hx     Cancer Neg Hx     Colon Cancer Neg Hx Indigestion, Heartburn, Nausea, vomiting, Diarrhea, Constipation,or Bowel Changes; No Bloody Stools or melena  Genito-Urinary ROS: No Dysuria, Hematuria or Nocturia. No Urinary Incontinence or Vaginal Discharge  Musculoskeletal ROS: No Arthralgia, Arthritis,Gout,Osteoporosis or Rheumatism  Neurological ROS: No CVA, Migraines, Epilepsy, Seizure Hx, or Limb Weakness  Dermatological ROS: No Rash, Itching, Hives, Mole Changes or Cancer                                                                                                                                                                                                                                  PHYSICAL Exam:     Constitutional:  Vitals:    09/24/19 0950   BP: 120/82   Site: Left Upper Arm   Position: Sitting   Cuff Size: Medium Adult   Pulse: 58   Resp: 18   Weight: 204 lb (92.5 kg)   Height: 5' 1\" (1.549 m)       General Appearance: This  is a well Developed, well Nourished, well groomed female. Her BMI was reviewed. Nutritional decision making was discussed. Skin:  There was a Normal Inspection of the skin without rashes or lesions. There were no rashes. (Papular, Maculopapular, Hives, Pustular, Macular)     There were no lesions (Ulcers, Erythema, Abn. Appearing Nevi)            Lymphatic:  No Lymph Nodes were Palpable in the neck , axilla or groin. # Of Lymph Nodes; Location ; Character [Normal]  [Shotty] [Tender] [Enlarged]     Neck and EENT:  The neck was supple. There were no masses   The thyroid was not enlarged and had no masses. Perrla, EOMI B/L, TMI B/L No Abnormalities. Throat inspected-No exudates or Masses, Nares Patent No Masses        Respiratory: The lungs were auscultated and found to be clear. There were no rales, rhonchi or wheezes. There was a good respiratory effort. Cardiovascular: The heart was in a regular rate and rhythm. . No S3 or S4. There was no murmur appreciated.  Location, grade, and radiation are not

## 2019-09-25 LAB
C TRACH DNA GENITAL QL NAA+PROBE: NEGATIVE
N. GONORRHOEAE DNA: NEGATIVE
SPECIMEN DESCRIPTION: NORMAL

## 2019-10-03 ENCOUNTER — TELEPHONE (OUTPATIENT)
Dept: OBGYN CLINIC | Age: 24
End: 2019-10-03

## 2019-10-03 DIAGNOSIS — E34.9 HORMONE DISTURBANCE: ICD-10-CM

## 2019-10-03 DIAGNOSIS — R10.2 PELVIC PAIN IN FEMALE: Primary | ICD-10-CM

## 2019-10-03 DIAGNOSIS — L65.9 HAIR LOSS: ICD-10-CM

## 2019-10-04 ENCOUNTER — HOSPITAL ENCOUNTER (OUTPATIENT)
Age: 24
Discharge: HOME | End: 2019-10-04
Payer: COMMERCIAL

## 2019-10-04 DIAGNOSIS — R10.2 PELVIC PAIN IN FEMALE: ICD-10-CM

## 2019-10-04 DIAGNOSIS — E34.9 HORMONE DISTURBANCE: ICD-10-CM

## 2019-10-04 DIAGNOSIS — L65.9 HAIR LOSS: ICD-10-CM

## 2019-10-04 LAB
ABSOLUTE EOS #: 0 K/UL (ref 0–0.4)
ABSOLUTE IMMATURE GRANULOCYTE: ABNORMAL K/UL (ref 0–0.3)
ABSOLUTE LYMPH #: 2.1 K/UL (ref 1–4.8)
ABSOLUTE MONO #: 0.5 K/UL (ref 0.1–1.3)
BASOPHILS # BLD: 1 % (ref 0–2)
BASOPHILS ABSOLUTE: 0 K/UL (ref 0–0.2)
CYTOLOGY REPORT: NORMAL
DIFFERENTIAL TYPE: ABNORMAL
EOSINOPHILS RELATIVE PERCENT: 1 % (ref 0–4)
ESTRADIOL LEVEL: 76 PG/ML (ref 27–314)
FOLLICLE STIMULATING HORMONE: 6 U/L (ref 1.7–21.5)
HCT VFR BLD CALC: 40.6 % (ref 36–46)
HEMOGLOBIN: 13.7 G/DL (ref 12–16)
IMMATURE GRANULOCYTES: ABNORMAL %
LYMPHOCYTES # BLD: 30 % (ref 24–44)
MCH RBC QN AUTO: 29.5 PG (ref 26–34)
MCHC RBC AUTO-ENTMCNC: 33.8 G/DL (ref 31–37)
MCV RBC AUTO: 87.2 FL (ref 80–100)
MONOCYTES # BLD: 8 % (ref 1–7)
NRBC AUTOMATED: ABNORMAL PER 100 WBC
PDW BLD-RTO: 13.3 % (ref 11.5–14.9)
PLATELET # BLD: 223 K/UL (ref 150–450)
PLATELET ESTIMATE: ABNORMAL
PMV BLD AUTO: 9.8 FL (ref 6–12)
RBC # BLD: 4.65 M/UL (ref 4–5.2)
RBC # BLD: ABNORMAL 10*6/UL
SEG NEUTROPHILS: 60 % (ref 36–66)
SEGMENTED NEUTROPHILS ABSOLUTE COUNT: 4.3 K/UL (ref 1.3–9.1)
TSH SERPL DL<=0.05 MIU/L-ACNC: 1.18 MIU/L (ref 0.3–5)
WBC # BLD: 6.9 K/UL (ref 3.5–11)
WBC # BLD: ABNORMAL 10*3/UL

## 2019-10-04 PROCEDURE — 84443 ASSAY THYROID STIM HORMONE: CPT

## 2019-10-04 PROCEDURE — 36415 COLL VENOUS BLD VENIPUNCTURE: CPT

## 2019-10-04 PROCEDURE — 83001 ASSAY OF GONADOTROPIN (FSH): CPT

## 2019-10-04 PROCEDURE — 85025 COMPLETE CBC W/AUTO DIFF WBC: CPT

## 2019-10-04 PROCEDURE — 82670 ASSAY OF TOTAL ESTRADIOL: CPT

## 2019-10-08 ENCOUNTER — TELEPHONE (OUTPATIENT)
Dept: OBGYN CLINIC | Age: 24
End: 2019-10-08

## 2019-10-09 ENCOUNTER — OFFICE VISIT (OUTPATIENT)
Dept: OBGYN CLINIC | Age: 24
End: 2019-10-09
Payer: COMMERCIAL

## 2019-10-09 DIAGNOSIS — R10.2 PELVIC PAIN IN FEMALE: ICD-10-CM

## 2019-10-09 DIAGNOSIS — E34.9 HORMONE DISTURBANCE: ICD-10-CM

## 2019-10-09 DIAGNOSIS — L65.9 HAIR LOSS: ICD-10-CM

## 2019-10-09 PROCEDURE — 76856 US EXAM PELVIC COMPLETE: CPT | Performed by: OBSTETRICS & GYNECOLOGY

## 2019-11-05 ENCOUNTER — HOSPITAL ENCOUNTER (OUTPATIENT)
Facility: CLINIC | Age: 24
Discharge: HOME | End: 2019-11-07
Payer: COMMERCIAL

## 2019-11-05 ENCOUNTER — OFFICE VISIT (OUTPATIENT)
Dept: FAMILY MEDICINE CLINIC | Age: 24
End: 2019-11-05
Payer: COMMERCIAL

## 2019-11-05 ENCOUNTER — HOSPITAL ENCOUNTER (OUTPATIENT)
Dept: GENERAL RADIOLOGY | Facility: CLINIC | Age: 24
Discharge: HOME | End: 2019-11-07
Payer: COMMERCIAL

## 2019-11-05 VITALS
DIASTOLIC BLOOD PRESSURE: 82 MMHG | HEART RATE: 69 BPM | BODY MASS INDEX: 39.11 KG/M2 | WEIGHT: 207 LBS | SYSTOLIC BLOOD PRESSURE: 122 MMHG | OXYGEN SATURATION: 99 %

## 2019-11-05 DIAGNOSIS — F41.9 ANXIETY AND DEPRESSION: ICD-10-CM

## 2019-11-05 DIAGNOSIS — F32.A ANXIETY AND DEPRESSION: ICD-10-CM

## 2019-11-05 DIAGNOSIS — R63.5 WEIGHT GAIN: ICD-10-CM

## 2019-11-05 DIAGNOSIS — I27.20 PULMONARY HTN (HCC): ICD-10-CM

## 2019-11-05 DIAGNOSIS — M25.551 RIGHT HIP PAIN: ICD-10-CM

## 2019-11-05 DIAGNOSIS — M54.50 LUMBAR PAIN: Primary | ICD-10-CM

## 2019-11-05 DIAGNOSIS — M54.50 LUMBAR PAIN: ICD-10-CM

## 2019-11-05 PROCEDURE — G8417 CALC BMI ABV UP PARAM F/U: HCPCS | Performed by: NURSE PRACTITIONER

## 2019-11-05 PROCEDURE — G8427 DOCREV CUR MEDS BY ELIG CLIN: HCPCS | Performed by: NURSE PRACTITIONER

## 2019-11-05 PROCEDURE — 1036F TOBACCO NON-USER: CPT | Performed by: NURSE PRACTITIONER

## 2019-11-05 PROCEDURE — 72100 X-RAY EXAM L-S SPINE 2/3 VWS: CPT

## 2019-11-05 PROCEDURE — 73502 X-RAY EXAM HIP UNI 2-3 VIEWS: CPT

## 2019-11-05 PROCEDURE — G8484 FLU IMMUNIZE NO ADMIN: HCPCS | Performed by: NURSE PRACTITIONER

## 2019-11-05 PROCEDURE — 99214 OFFICE O/P EST MOD 30 MIN: CPT | Performed by: NURSE PRACTITIONER

## 2019-11-05 ASSESSMENT — ENCOUNTER SYMPTOMS
GASTROINTESTINAL NEGATIVE: 1
EYES NEGATIVE: 1
RESPIRATORY NEGATIVE: 1
ALLERGIC/IMMUNOLOGIC NEGATIVE: 1
COUGH: 0

## 2020-02-20 ENCOUNTER — OFFICE VISIT (OUTPATIENT)
Dept: FAMILY MEDICINE CLINIC | Age: 25
End: 2020-02-20
Payer: COMMERCIAL

## 2020-02-20 VITALS
HEART RATE: 55 BPM | BODY MASS INDEX: 40.78 KG/M2 | HEIGHT: 61 IN | DIASTOLIC BLOOD PRESSURE: 62 MMHG | OXYGEN SATURATION: 98 % | WEIGHT: 216 LBS | SYSTOLIC BLOOD PRESSURE: 110 MMHG

## 2020-02-20 PROBLEM — E65 CENTRAL OBESITY: Status: ACTIVE | Noted: 2020-02-20

## 2020-02-20 PROBLEM — L70.0 ACNE VULGARIS: Status: ACTIVE | Noted: 2020-02-20

## 2020-02-20 PROBLEM — E28.2 PCOS (POLYCYSTIC OVARIAN SYNDROME): Status: ACTIVE | Noted: 2020-02-20

## 2020-02-20 PROBLEM — L65.9 THINNING HAIR: Status: ACTIVE | Noted: 2020-02-20

## 2020-02-20 PROCEDURE — G8431 POS CLIN DEPRES SCRN F/U DOC: HCPCS | Performed by: NURSE PRACTITIONER

## 2020-02-20 PROCEDURE — 90471 IMMUNIZATION ADMIN: CPT | Performed by: NURSE PRACTITIONER

## 2020-02-20 PROCEDURE — G8417 CALC BMI ABV UP PARAM F/U: HCPCS | Performed by: NURSE PRACTITIONER

## 2020-02-20 PROCEDURE — G8427 DOCREV CUR MEDS BY ELIG CLIN: HCPCS | Performed by: NURSE PRACTITIONER

## 2020-02-20 PROCEDURE — 90472 IMMUNIZATION ADMIN EACH ADD: CPT | Performed by: NURSE PRACTITIONER

## 2020-02-20 PROCEDURE — 96160 PT-FOCUSED HLTH RISK ASSMT: CPT | Performed by: NURSE PRACTITIONER

## 2020-02-20 PROCEDURE — G8484 FLU IMMUNIZE NO ADMIN: HCPCS | Performed by: NURSE PRACTITIONER

## 2020-02-20 PROCEDURE — 90651 9VHPV VACCINE 2/3 DOSE IM: CPT | Performed by: NURSE PRACTITIONER

## 2020-02-20 PROCEDURE — 1036F TOBACCO NON-USER: CPT | Performed by: NURSE PRACTITIONER

## 2020-02-20 PROCEDURE — 99214 OFFICE O/P EST MOD 30 MIN: CPT | Performed by: NURSE PRACTITIONER

## 2020-02-20 PROCEDURE — 90716 VAR VACCINE LIVE SUBQ: CPT | Performed by: NURSE PRACTITIONER

## 2020-02-20 RX ORDER — SPIRONOLACTONE 100 MG/1
100 TABLET, FILM COATED ORAL DAILY
Qty: 90 TABLET | Refills: 1 | Status: SHIPPED | OUTPATIENT
Start: 2020-02-20 | End: 2020-08-18

## 2020-02-20 RX ORDER — BUPROPION HYDROCHLORIDE 150 MG/1
150 TABLET ORAL EVERY MORNING
Qty: 30 TABLET | Refills: 3 | Status: SHIPPED | OUTPATIENT
Start: 2020-02-20 | End: 2020-06-19

## 2020-02-20 ASSESSMENT — PATIENT HEALTH QUESTIONNAIRE - PHQ9
2. FEELING DOWN, DEPRESSED OR HOPELESS: 3
SUM OF ALL RESPONSES TO PHQ QUESTIONS 1-9: 17
SUM OF ALL RESPONSES TO PHQ9 QUESTIONS 1 & 2: 5
5. POOR APPETITE OR OVEREATING: 2
6. FEELING BAD ABOUT YOURSELF - OR THAT YOU ARE A FAILURE OR HAVE LET YOURSELF OR YOUR FAMILY DOWN: 2
8. MOVING OR SPEAKING SO SLOWLY THAT OTHER PEOPLE COULD HAVE NOTICED. OR THE OPPOSITE, BEING SO FIGETY OR RESTLESS THAT YOU HAVE BEEN MOVING AROUND A LOT MORE THAN USUAL: 0
9. THOUGHTS THAT YOU WOULD BE BETTER OFF DEAD, OR OF HURTING YOURSELF: 0
10. IF YOU CHECKED OFF ANY PROBLEMS, HOW DIFFICULT HAVE THESE PROBLEMS MADE IT FOR YOU TO DO YOUR WORK, TAKE CARE OF THINGS AT HOME, OR GET ALONG WITH OTHER PEOPLE: 2
1. LITTLE INTEREST OR PLEASURE IN DOING THINGS: 2
SUM OF ALL RESPONSES TO PHQ QUESTIONS 1-9: 17
4. FEELING TIRED OR HAVING LITTLE ENERGY: 3
3. TROUBLE FALLING OR STAYING ASLEEP: 3
7. TROUBLE CONCENTRATING ON THINGS, SUCH AS READING THE NEWSPAPER OR WATCHING TELEVISION: 2

## 2020-02-20 ASSESSMENT — ENCOUNTER SYMPTOMS
COUGH: 0
EYES NEGATIVE: 1
RESPIRATORY NEGATIVE: 1
GASTROINTESTINAL NEGATIVE: 1
ALLERGIC/IMMUNOLOGIC NEGATIVE: 1

## 2020-02-20 NOTE — PROGRESS NOTES
Neg Hx     Cancer Neg Hx     Colon Cancer Neg Hx     Diabetes Neg Hx     Eclampsia Neg Hx     Ovarian Cancer Neg Hx      Labor Neg Hx     Spont Abortions Neg Hx     Stroke Neg Hx           Social History     Tobacco Use    Smoking status: Never Smoker    Smokeless tobacco: Never Used   Substance Use Topics    Alcohol use: No         Current Outpatient Medications   Medication Sig Dispense Refill    buPROPion (WELLBUTRIN XL) 150 MG extended release tablet Take 1 tablet by mouth every morning 30 tablet 3    spironolactone (ALDACTONE) 100 MG tablet Take 1 tablet by mouth daily 90 tablet 1    norethindrone-ethinyl estradiol (LOESTRIN FE ) 1-20 MG-MCG per tablet Take 1 tablet by mouth daily Patient is to skip placebo pills and take continuously 1 packet 11    Biotin 5000 MCG CAPS Take by mouth daily      vitamin B-12 (CYANOCOBALAMIN) 1000 MCG tablet Take 1,000 mcg by mouth daily      Cholecalciferol (VITAMIN D3) 5000 units TABS Take by mouth every other day      FLUoxetine (PROZAC) 40 MG capsule Take 1 capsule by mouth daily 90 capsule 1     MG tablet        No current facility-administered medications for this visit. No Known Allergies    Health Maintenance   Topic Date Due    Varicella vaccine (1 of 2 - 2-dose childhood series) 1995    HPV vaccine (1 - Female 2-dose series) 2005    Potassium monitoring  10/02/2019    Creatinine monitoring  10/02/2019    Cervical cancer screen  2020    DTaP/Tdap/Td vaccine (4 - Td) 2024    Shingles Vaccine (1 of 2) 2044    Flu vaccine  Completed    HIV screen  Completed    Hepatitis A vaccine  Aged Out    Hepatitis B vaccine  Aged Out    Hib vaccine  Aged Out    Meningococcal (ACWY) vaccine  Aged Out    Pneumococcal 0-64 years Vaccine  Aged Out          Review of Systems   Constitutional: Positive for fatigue and unexpected weight change. HENT: Negative. Eyes: Negative. Respiratory: Negative. Negative for cough. Cardiovascular: Negative. Negative for chest pain and palpitations. Gastrointestinal: Negative. Endocrine: Negative. Genitourinary: Negative. Musculoskeletal: Negative for arthralgias. Skin: Negative. Allergic/Immunologic: Negative. Neurological: Negative. Negative for numbness and headaches. Hematological: Negative. Psychiatric/Behavioral: Positive for dysphoric mood and sleep disturbance. The patient is nervous/anxious. Objective:     /62 (Site: Right Upper Arm, Position: Sitting, Cuff Size: Medium Adult)   Pulse 55   Ht 5' 1\" (1.549 m)   Wt 216 lb (98 kg)   SpO2 98%   BMI 40.81 kg/m²   Physical Exam  Constitutional:       Appearance: She is well-developed. She is obese. HENT:      Head: Normocephalic. Eyes:      Conjunctiva/sclera: Conjunctivae normal.   Cardiovascular:      Rate and Rhythm: Normal rate and regular rhythm. Pulmonary:      Effort: Pulmonary effort is normal.      Breath sounds: Normal breath sounds. Skin:     General: Skin is warm and dry. Neurological:      Mental Status: She is alert and oriented to person, place, and time. Psychiatric:         Behavior: Behavior normal.         Thought Content: Thought content normal.         Judgment: Judgment normal.           Assessment:      1. Mild episode of recurrent major depressive disorder ([address]    2. Positive depression screening    3. Weight gain    4. Thinning hair    5. Acne vulgaris    6. Central obesity    7. PCOS (polycystic ovarian syndrome)    8. Need for HPV vaccination    9. Need for varicella vaccine                     Plan:      Orders Placed This Encounter   Procedures    Varicella vaccine subcutaneous (VARIVAX)    HPV vaccine 9-valent IM (GARDASIL 9)    Positive Screen for Clinical Depression with a Documented Follow-up Plan    1.  Mild episode of recurrent major depressive disorder ([address]  Start wellbutrin 150 daily   Attempted to use a mood stabilizer and insurance denied    2. Positive depression screening    - Positive Screen for Clinical Depression with a Documented Follow-up Plan       7. PCOS (polycystic ovarian syndrome)  Based on all the the symptoms below, PCOS is likely. Will add spironolactone. 3. Weight gain  - Insulin, Total  4. Thinning hair  5. Acne vulgaris  6. Central obesity  Elevated LDL  - spironolactone (ALDACTONE) 100 MG tablet; Take 1 tablet by mouth daily  Dispense: 90 tablet; Refill: 1    Continue working out, following eating plan. Return in about 2 months (around 4/20/2020) for MOOD. Patient given educational materials - see patientinstructions. Discussed use, benefit, and side effects of prescribed medications. All patient questions answered. Pt voiced understanding. Reviewed health maintenance. Instructed to continue current medications, diet and exercise. Patient agreedwith treatment plan. Follow up as directed. Electronically signed by LUCY Campos CNP on 2/20/2020  On the basis of positive PHQ-9 screening (PHQ-9 Total Score: 17), the following plan was implemented: medication prescribed: Wellbutrin- 150- patient will call for any significant medication side effects or worsening symptoms of depression. Patient will follow-up in 2 month(s) with PCP.

## 2020-02-28 RX ORDER — FLUOXETINE HYDROCHLORIDE 40 MG/1
CAPSULE ORAL
Qty: 90 CAPSULE | Refills: 1 | Status: SHIPPED | OUTPATIENT
Start: 2020-02-28 | End: 2020-08-25

## 2020-03-23 ENCOUNTER — PATIENT MESSAGE (OUTPATIENT)
Dept: FAMILY MEDICINE CLINIC | Age: 25
End: 2020-03-23

## 2020-04-21 ENCOUNTER — TELEMEDICINE (OUTPATIENT)
Dept: FAMILY MEDICINE CLINIC | Age: 25
End: 2020-04-21
Payer: COMMERCIAL

## 2020-04-21 VITALS — BODY MASS INDEX: 40.91 KG/M2 | WEIGHT: 216.5 LBS | TEMPERATURE: 98.2 F

## 2020-04-21 PROCEDURE — 99213 OFFICE O/P EST LOW 20 MIN: CPT | Performed by: NURSE PRACTITIONER

## 2020-04-21 PROCEDURE — G8417 CALC BMI ABV UP PARAM F/U: HCPCS | Performed by: NURSE PRACTITIONER

## 2020-04-21 PROCEDURE — G8427 DOCREV CUR MEDS BY ELIG CLIN: HCPCS | Performed by: NURSE PRACTITIONER

## 2020-04-21 PROCEDURE — 1036F TOBACCO NON-USER: CPT | Performed by: NURSE PRACTITIONER

## 2020-04-21 ASSESSMENT — ENCOUNTER SYMPTOMS
COUGH: 0
ALLERGIC/IMMUNOLOGIC NEGATIVE: 1
EYES NEGATIVE: 1
RESPIRATORY NEGATIVE: 1
GASTROINTESTINAL NEGATIVE: 1

## 2020-06-02 ENCOUNTER — OFFICE VISIT (OUTPATIENT)
Dept: PRIMARY CARE CLINIC | Age: 25
End: 2020-06-02
Payer: COMMERCIAL

## 2020-06-02 ENCOUNTER — HOSPITAL ENCOUNTER (OUTPATIENT)
Age: 25
Setting detail: SPECIMEN
Discharge: HOME | End: 2020-06-02
Payer: COMMERCIAL

## 2020-06-02 VITALS — TEMPERATURE: 97.6 F | BODY MASS INDEX: 41.19 KG/M2 | WEIGHT: 218 LBS | HEART RATE: 61 BPM | OXYGEN SATURATION: 98 %

## 2020-06-02 PROCEDURE — G8427 DOCREV CUR MEDS BY ELIG CLIN: HCPCS | Performed by: FAMILY MEDICINE

## 2020-06-02 PROCEDURE — 1036F TOBACCO NON-USER: CPT | Performed by: FAMILY MEDICINE

## 2020-06-02 PROCEDURE — 99213 OFFICE O/P EST LOW 20 MIN: CPT | Performed by: FAMILY MEDICINE

## 2020-06-02 PROCEDURE — 87880 STREP A ASSAY W/OPTIC: CPT | Performed by: FAMILY MEDICINE

## 2020-06-02 PROCEDURE — G8417 CALC BMI ABV UP PARAM F/U: HCPCS | Performed by: FAMILY MEDICINE

## 2020-06-02 ASSESSMENT — ENCOUNTER SYMPTOMS
WHEEZING: 0
CHANGE IN BOWEL HABIT: 0
NAUSEA: 0
SHORTNESS OF BREATH: 0
SORE THROAT: 1
DIARRHEA: 0
ABDOMINAL PAIN: 0
COUGH: 1
SINUS PAIN: 1
VOMITING: 0
SWOLLEN GLANDS: 1

## 2020-06-02 NOTE — PROGRESS NOTES
Smokeless tobacco: Never Used   Substance Use Topics    Alcohol use: No      Current Outpatient Medications   Medication Sig Dispense Refill    FLUoxetine (PROZAC) 40 MG capsule TAKE ONE CAPSULE BY MOUTH DAILY 90 capsule 1    buPROPion (WELLBUTRIN XL) 150 MG extended release tablet Take 1 tablet by mouth every morning 30 tablet 3    spironolactone (ALDACTONE) 100 MG tablet Take 1 tablet by mouth daily 90 tablet 1    Biotin 5000 MCG CAPS Take by mouth daily      vitamin B-12 (CYANOCOBALAMIN) 1000 MCG tablet Take 1,000 mcg by mouth daily      Cholecalciferol (VITAMIN D3) 5000 units TABS Take by mouth every other day       MG tablet       norethindrone-ethinyl estradiol (LOESTRIN FE 1/20) 1-20 MG-MCG per tablet Take 1 tablet by mouth daily Patient is to skip placebo pills and take continuously 1 packet 11     No current facility-administered medications for this visit. No Known Allergies    Health Maintenance   Topic Date Due    Potassium monitoring  10/02/2019    Creatinine monitoring  10/02/2019    HPV vaccine (2 - 3-dose series) 03/19/2020    Varicella vaccine (2 of 2 - 13+ 2-dose series) 03/19/2020    Cervical cancer screen  09/24/2020    DTaP/Tdap/Td vaccine (4 - Td) 05/04/2024    Flu vaccine  Completed    HIV screen  Completed    Hepatitis A vaccine  Aged Out    Hepatitis B vaccine  Aged Out    Hib vaccine  Aged Out    Meningococcal (ACWY) vaccine  Aged Out    Pneumococcal 0-64 years Vaccine  Aged Out       :      Review of Systems   Constitutional: Negative for chills and fever. HENT: Positive for congestion, postnasal drip, sinus pain and sore throat. Negative for ear discharge and ear pain. Respiratory: Positive for cough. Negative for shortness of breath and wheezing. Gastrointestinal: Negative for abdominal pain, change in bowel habit, diarrhea, nausea and vomiting. Musculoskeletal: Negative for myalgias. Skin: Negative for pallor and rash.    Neurological:

## 2020-06-02 NOTE — PATIENT INSTRUCTIONS
animals in your home  People: As much as possible, you should stay in a specific room and away from other people in your home. Also, you should use a separate bathroom, if available. Animals: You should restrict contact with pets and other animals while you are sick with COVID-19, just like you would around other people. Although there have not been reports of pets or other animals becoming sick with COVID-19, it is still recommended that people sick with COVID-19 limit contact with animals until more information is known about the virus. When possible, have another member of your household care for your animals while you are sick. If you are sick with COVID-19, avoid contact with your pet, including petting, snuggling, being kissed or licked, and sharing food. If you must care for your pet or be around animals while you are sick, wash your hands before and after you interact with pets and wear a facemask. Call ahead before visiting your doctor  If you have a medical appointment, call the healthcare provider and tell them that you have or may have COVID-19. This will help the healthcare providers office take steps to keep other people from getting infected or exposed. Wear a facemask  You should wear a facemask when you are around other people (e.g., sharing a room or vehicle) or pets and before you enter a healthcare providers office. If you are not able to wear a facemask (for example, because it causes trouble breathing), then people who live with you should not stay in the same room with you, or they should wear a facemask if they enter your room. Cover your coughs and sneezes  Cover your mouth and nose with a tissue when you cough or sneeze. Throw used tissues in a lined trash can. Immediately wash your hands with soap and water for at least 20 seconds or, if soap and water are not available, clean your hands with an alcohol-based hand  that contains at least 60% alcohol.   Clean your hands

## 2020-06-05 LAB — SARS-COV-2, NAA: NOT DETECTED

## 2020-06-06 ENCOUNTER — TELEPHONE (OUTPATIENT)
Dept: PRIMARY CARE CLINIC | Age: 25
End: 2020-06-06

## 2020-06-19 RX ORDER — BUPROPION HYDROCHLORIDE 150 MG/1
TABLET ORAL
Qty: 90 TABLET | Refills: 1 | Status: SHIPPED | OUTPATIENT
Start: 2020-06-19 | End: 2021-07-07 | Stop reason: ALTCHOICE

## 2020-06-30 ENCOUNTER — HOSPITAL ENCOUNTER (OUTPATIENT)
Age: 25
Discharge: HOME | End: 2020-06-30

## 2020-06-30 LAB — RUBV IGG SER QL: 24.3 IU/ML

## 2020-06-30 PROCEDURE — 86735 MUMPS ANTIBODY: CPT

## 2020-06-30 PROCEDURE — 86765 RUBEOLA ANTIBODY: CPT

## 2020-06-30 PROCEDURE — 86787 VARICELLA-ZOSTER ANTIBODY: CPT

## 2020-06-30 PROCEDURE — 86481 TB AG RESPONSE T-CELL SUSP: CPT

## 2020-06-30 PROCEDURE — 86762 RUBELLA ANTIBODY: CPT

## 2020-07-01 LAB
MEASLES IMMUNE (IGG): 1.96
MUV IGG SER QL: 0.65
VZV IGG SER QL IA: 3.06

## 2020-07-03 LAB — T-SPOT TB TEST: NORMAL

## 2020-07-10 ENCOUNTER — HOSPITAL ENCOUNTER (OUTPATIENT)
Age: 25
Discharge: HOME | End: 2020-07-10

## 2020-07-10 PROCEDURE — 81025 URINE PREGNANCY TEST: CPT

## 2020-07-13 LAB — HCG, PREGNANCY URINE (POC): NEGATIVE

## 2020-08-03 RX ORDER — NORETHINDRONE ACETATE AND ETHINYL ESTRADIOL 1MG-20(21)
KIT ORAL
Qty: 28 TABLET | Refills: 2 | Status: SHIPPED | OUTPATIENT
Start: 2020-08-03 | End: 2021-07-07 | Stop reason: ALTCHOICE

## 2020-08-17 ENCOUNTER — NURSE TRIAGE (OUTPATIENT)
Dept: OTHER | Facility: CLINIC | Age: 25
End: 2020-08-17

## 2020-08-17 ENCOUNTER — HOSPITAL ENCOUNTER (OUTPATIENT)
Age: 25
Discharge: HOME | End: 2020-08-17
Payer: COMMERCIAL

## 2020-08-17 PROCEDURE — U0003 INFECTIOUS AGENT DETECTION BY NUCLEIC ACID (DNA OR RNA); SEVERE ACUTE RESPIRATORY SYNDROME CORONAVIRUS 2 (SARS-COV-2) (CORONAVIRUS DISEASE [COVID-19]), AMPLIFIED PROBE TECHNIQUE, MAKING USE OF HIGH THROUGHPUT TECHNOLOGIES AS DESCRIBED BY CMS-2020-01-R: HCPCS

## 2020-08-17 NOTE — TELEPHONE ENCOUNTER
Reason for Disposition   [1] COVID-19 infection suspected by caller or triager AND [2] mild symptoms (cough, fever, or others) AND [2] no complications or SOB    Answer Assessment - Initial Assessment Questions  1. COVID-19 DIAGNOSIS: \"Who made your Coronavirus (COVID-19) diagnosis? \" \"Was it confirmed by a positive lab test?\" If not diagnosed by a HCP, ask \"Are there lots of cases (community spread) where you live? \" (See public health department website, if unsure)      Tested end of may (neg)  2. ONSET: \"When did the COVID-19 symptoms start? \"       Friday 8/14  3. WORST SYMPTOM: \"What is your worst symptom? \" (e.g., cough, fever, shortness of breath, muscle aches)      Nasal congestion/sinus pressure  4. COUGH: \"Do you have a cough? \" If so, ask: \"How bad is the cough? \"        Dry cough  5. FEVER: \"Do you have a fever? \" If so, ask: \"What is your temperature, how was it measured, and when did it start? \"      Fever (low grade - now resolved - ran for 3 days)  6. RESPIRATORY STATUS: \"Describe your breathing? \" (e.g., shortness of breath, wheezing, unable to speak)       No breathing problems (chest burns and she feels like she can't get a deep breath)  7. BETTER-SAME-WORSE: Ramy Castañeda you getting better, staying the same or getting worse compared to yesterday? \"  If getting worse, ask, \"In what way? \"      Worse  8. HIGH RISK DISEASE: \"Do you have any chronic medical problems? \" (e.g., asthma, heart or lung disease, weak immune system, etc.)      *No Answer*  9. PREGNANCY: \"Is there any chance you are pregnant? \" \"When was your last menstrual period? \"      no  10. OTHER SYMPTOMS: \"Do you have any other symptoms? \"  (e.g., chills, fatigue, headache, loss of smell or taste, muscle pain, sore throat)        Chest burning, nasal congestion/sinus pressure, dry cough, sore throat    Protocols used: CORONAVIRUS (COVID-19) DIAGNOSED OR SUSPECTED-ADULT-    Patient / employee c/o of flu like symptoms: sinus pressure, nasal congestion,

## 2020-08-18 RX ORDER — SPIRONOLACTONE 100 MG/1
TABLET, FILM COATED ORAL
Qty: 90 TABLET | Refills: 3 | Status: SHIPPED | OUTPATIENT
Start: 2020-08-18 | End: 2020-11-16

## 2020-08-19 LAB — SARS-COV-2, NAA: NOT DETECTED

## 2020-08-19 NOTE — PROGRESS NOTES
Visit Information    Have you changed or started any medications since your last visit including any over-the-counter medicines, vitamins, or herbal medicines? no   Are you having any side effects from any of your medications? -  no  Have you stopped taking any of your medications? Is so, why? -  no    Have you seen any other physician or provider since your last visit? Yes  Have you had any other diagnostic tests since your last visit? Yes - Records Obtained  Have you been seen in the emergency room and/or had an admission to a hospital since we last saw you? No  Have you had your routine dental cleaning in the past 6 months? no    Have you activated your [facility] account? If not, what are your barriers?  No:      Patient Care Team:  LUCY Geller CNP as PCP - General (Certified Nurse Practitioner)  LUCY Geller CNP as PCP - [facility]  Radha Cao DO as Consulting Physician (Obstetrics & Gynecology)  Jj Monahan MD as Consulting Physician (Internal Medicine Cardiovascular Disease)  Radha Cao DO as Consulting Physician (Obstetrics & Gynecology)    Medical History Review  Past Medical, Family, and Social History reviewed and does contribute to the patient presenting condition    Health Maintenance   Topic Date Due    Potassium monitoring  10/02/2019    Creatinine monitoring  10/02/2019    HPV vaccine (2 - 3-dose series) 03/19/2020    Varicella vaccine (2 of 2 - 13+ 2-dose series) 03/19/2020    Flu vaccine (1) 09/01/2020    Cervical cancer screen  09/24/2020    DTaP/Tdap/Td vaccine (4 - Td) 05/04/2024    HIV screen  Completed    Hepatitis A vaccine  Aged Out    Hepatitis B vaccine  Aged Out    Hib vaccine  Aged Out    Meningococcal (ACWY) vaccine  Aged Out    Pneumococcal 0-64 years Vaccine  Aged Out

## 2020-08-20 ENCOUNTER — VIRTUAL VISIT (OUTPATIENT)
Dept: FAMILY MEDICINE CLINIC | Age: 25
End: 2020-08-20
Payer: COMMERCIAL

## 2020-08-20 PROBLEM — E66.01 MORBIDLY OBESE (HCC): Status: ACTIVE | Noted: 2020-08-20

## 2020-08-20 PROCEDURE — 99213 OFFICE O/P EST LOW 20 MIN: CPT | Performed by: NURSE PRACTITIONER

## 2020-08-20 PROCEDURE — G8427 DOCREV CUR MEDS BY ELIG CLIN: HCPCS | Performed by: NURSE PRACTITIONER

## 2020-08-20 RX ORDER — FLUCONAZOLE 150 MG/1
150 TABLET ORAL ONCE
Qty: 1 TABLET | Refills: 1 | Status: SHIPPED | OUTPATIENT
Start: 2020-08-20 | End: 2020-08-20

## 2020-08-20 RX ORDER — AMOXICILLIN AND CLAVULANATE POTASSIUM 875; 125 MG/1; MG/1
1 TABLET, FILM COATED ORAL 2 TIMES DAILY
Qty: 14 TABLET | Refills: 0 | Status: SHIPPED | OUTPATIENT
Start: 2020-08-20 | End: 2020-08-27

## 2020-08-20 ASSESSMENT — ENCOUNTER SYMPTOMS
SORE THROAT: 1
RHINORRHEA: 1
SINUS PRESSURE: 1
COUGH: 1

## 2020-08-20 NOTE — PROGRESS NOTES
2020    TELEHEALTH EVALUATION -- Audio/Visual (During MIUVD-43 public health emergency)    HPI:    Mariano Duke (:  1995) has requested an audio/video evaluation for the following concern(s):    VV with Romi Annabelle for cough, congestion, low grade fever, headache, sinus pain and pressure. . Symptoms started . She was sent home from work on Monday and did get COVID tested. Results are negative. Review of Systems   Constitutional: Positive for fatigue and fever. HENT: Positive for congestion, postnasal drip, rhinorrhea, sinus pressure and sore throat. Respiratory: Positive for cough. Genitourinary: Negative. Musculoskeletal: Negative. Skin: Negative. Neurological: Positive for headaches. Psychiatric/Behavioral: Negative. Prior to Visit Medications    Medication Sig Taking?  Authorizing Provider   amoxicillin-clavulanate (AUGMENTIN) 875-125 MG per tablet Take 1 tablet by mouth 2 times daily for 7 days Yes LUCY Hall CNP   fluconazole (DIFLUCAN) 150 MG tablet Take 1 tablet by mouth once for 1 dose Yes LUCY Hall CNP   spironolactone (ALDACTONE) 100 MG tablet TAKE ONE TABLET BY MOUTH DAILY Yes LUCY Hall CNP   BLISOVI FE  1-20 MG-MCG per tablet TAKE ONE TABLET BY MOUTH DAILY SKIP PLACEBO PILLS AND TAKE CONTINUOUSLY Yes LUCY Garcia CNP   buPROPion (WELLBUTRIN XL) 150 MG extended release tablet TAKE ONE TABLET BY MOUTH EVERY MORNING Yes LUCY Rocha CNP   FLUoxetine (PROZAC) 40 MG capsule TAKE ONE CAPSULE BY MOUTH DAILY Yes LUCY Hall CNP   Biotin 5000 MCG CAPS Take by mouth daily Yes Historical Provider, MD   vitamin B-12 (CYANOCOBALAMIN) 1000 MCG tablet Take 1,000 mcg by mouth daily Yes Historical Provider, MD   Cholecalciferol (VITAMIN D3) 5000 units TABS Take by mouth every other day Yes Historical Provider, MD    MG tablet  Yes Historical Provider, MD       Social History     Tobacco Use  Smoking status: Never Smoker    Smokeless tobacco: Never Used   Substance Use Topics    Alcohol use: No    Drug use: No        No Known Allergies,   Past Medical History:   Diagnosis Date    Anxiety and depression 2016    Anxiety and depression     Depression 2018    Heart murmur     History of  section     Obesity 2015       PHYSICAL EXAMINATION:    Vital Signs: (As obtained by patient/caregiver or practitioner observation)    Blood pressure-  Heart rate-    Respiratory rate-    Temperature-  Pulse oximetry-     Constitutional: [x] Appears well-developed and well-nourished [x] No apparent distress      [] Abnormal-   Mental status  [x] Alert and awake  [x] Oriented to person/place/time [x]Able to follow commands      Eyes: puffiness noted  EOM    [x]  Normal  [] Abnormal-  Sclera  [x]  Normal  [] Abnormal -         Discharge []  None visible  [] Abnormal -    HENT:   [x] Normocephalic, atraumatic. [] Abnormal   [] Mouth/Throat: Mucous membranes are moist.     External Ears [] Normal  [] Abnormal-     Neck: [x] No visualized mass     Pulmonary/Chest: [x] Respiratory effort normal.  [x] No visualized signs of difficulty breathing or respiratory distress        [] Abnormal-      Musculoskeletal:   [x] Normal gait with no signs of ataxia         [x] Normal range of motion of neck        [] Abnormal-       Neurological:        [x] No Facial Asymmetry (Cranial nerve 7 motor function) (limited exam to video visit)          [x] No gaze palsy        [] Abnormal-         Skin:        [x] No significant exanthematous lesions or discoloration noted on facial skin         [] Abnormal-            Psychiatric:       [x] Normal Affect [] No Hallucinations        [] Abnormal-     Other pertinent observable physical exam findings-     ASSESSMENT/PLAN:  1. Acute bacterial sinusitis    - amoxicillin-clavulanate (AUGMENTIN) 875-125 MG per tablet;  Take 1 tablet by mouth 2 times daily for 7 days Dispense: 14 tablet; Refill: 0  Return to work letter sent to My Chart   2. Pulmonary HTN ([facility]      3. Morbidly obese ([facility]        No follow-ups on file. Zunilda Slaughter is a 31 y.o. female being evaluated by a Virtual Visit (video visit) encounter to address concerns as mentioned above. A caregiver was present when appropriate. Due to this being a TeleHealth encounter (During [facility]- public health emergency), evaluation of the following organ systems was limited: Vitals/Constitutional/EENT/Resp/CV/GI//MS/Neuro/Skin/Heme-Lymph-Imm. Pursuant to the emergency declaration under the [address] [place] [address] authority and the [employer] Act, this Virtual Visit was conducted with patient's (and/or legal guardian's) consent, to reduce the patient's risk of exposure to COVID-19 and provide necessary medical care. The patient (and/or legal guardian) has also been advised to contact this office for worsening conditions or problems, and seek emergency medical treatment and/or call 911 if deemed necessary. Patient identification was verified at the start of the visit: Yes    Total time spent on this encounter: Not billed by time    Services were provided through a video synchronous discussion virtually to substitute for in-person clinic visit. Patient and provider were located at their individual homes. --LUCY Murphy CNP on 8/20/2020 at 10:44 AM    An electronic signature was used to authenticate this note.

## 2020-08-20 NOTE — LETTER
[facility] [employer]  [address] [place] [address] [address] [address]  Phone: [phone]  Fax: [phone]    LUCY Arora CNP        August 20, 2020     Patient: Vineet Arana   YOB: 1995   Date of Visit: 8/20/2020       To Whom It May Concern: It is my medical opinion that Bishnu Calderon may return to work on 8/24/2020. If you have any questions or concerns, please don't hesitate to call.     Sincerely,        LUCY Arora CNP

## 2020-08-26 RX ORDER — FLUOXETINE HYDROCHLORIDE 40 MG/1
CAPSULE ORAL
Qty: 90 CAPSULE | Refills: 1 | Status: SHIPPED
Start: 2020-08-26 | End: 2021-07-07 | Stop reason: SINTOL

## 2020-09-01 ENCOUNTER — PATIENT MESSAGE (OUTPATIENT)
Dept: FAMILY MEDICINE CLINIC | Age: 25
End: 2020-09-01

## 2020-09-01 RX ORDER — IBUPROFEN 800 MG/1
800 TABLET ORAL EVERY 8 HOURS PRN
Qty: 90 TABLET | Refills: 1 | Status: SHIPPED | OUTPATIENT
Start: 2020-09-01 | End: 2022-03-04 | Stop reason: SDUPTHER

## 2020-09-01 NOTE — TELEPHONE ENCOUNTER
From: Gregory Rivera  To: [facility], APRN - CNP  Sent: 9/1/2020 1:39 PM EDT  Subject: Prescription Question    Hello,   I use Ibuprofen 800 mg for my migraines and I have recently ran out. I wanted to know if Micheline Simon can send a script to [facility] on [facility] for me.    Thank you   Stephanie Renee

## 2020-10-01 ENCOUNTER — TELEMEDICINE (OUTPATIENT)
Dept: FAMILY MEDICINE CLINIC | Age: 25
End: 2020-10-01
Payer: COMMERCIAL

## 2020-10-01 PROCEDURE — 99213 OFFICE O/P EST LOW 20 MIN: CPT | Performed by: NURSE PRACTITIONER

## 2020-10-01 PROCEDURE — G8427 DOCREV CUR MEDS BY ELIG CLIN: HCPCS | Performed by: NURSE PRACTITIONER

## 2020-10-01 RX ORDER — AMOXICILLIN AND CLAVULANATE POTASSIUM 875; 125 MG/1; MG/1
1 TABLET, FILM COATED ORAL 2 TIMES DAILY
Qty: 20 TABLET | Refills: 0 | Status: SHIPPED | OUTPATIENT
Start: 2020-10-01 | End: 2020-10-11

## 2020-10-01 ASSESSMENT — ENCOUNTER SYMPTOMS
COUGH: 1
SINUS PRESSURE: 1
RHINORRHEA: 1
SORE THROAT: 1

## 2020-10-01 NOTE — PROGRESS NOTES
10/1/2020    TELEHEALTH EVALUATION -- Audio/Visual (During [facility]-46 public health emergency)    HPI:    Reji Gallegos (:  1995) has requested an audio/video evaluation for the following concern(s):    VV with Gloria Villalpandored for sinus congestion, fatigue, cough, ear itching, dizziness, sore throat. She can taste and smell. She has had symptoms for the last 6 days. She has facial pain and pressure. She is getting headaches. Review of Systems   Constitutional: Positive for fatigue. Negative for fever. HENT: Positive for congestion, postnasal drip, rhinorrhea, sinus pressure and sore throat. Respiratory: Positive for cough. Genitourinary: Negative. Musculoskeletal: Negative. Skin: Negative. Neurological: Negative. Psychiatric/Behavioral: Negative. Prior to Visit Medications    Medication Sig Taking?  Authorizing Provider   amoxicillin-clavulanate (AUGMENTIN) 875-125 MG per tablet Take 1 tablet by mouth 2 times daily for 10 days Yes LUCY Hall CNP   ibuprofen (IBU) 800 MG tablet Take 1 tablet by mouth every 8 hours as needed for Pain Yes LUCY Hall CNP   FLUoxetine (PROZAC) 40 MG capsule TAKE ONE CAPSULE BY MOUTH DAILY Yes LUCY Hall CNP   spironolactone (ALDACTONE) 100 MG tablet TAKE ONE TABLET BY MOUTH DAILY Yes LUCY Hall CNP   buPROPion (WELLBUTRIN XL) 150 MG extended release tablet TAKE ONE TABLET BY MOUTH EVERY MORNING Yes LUCY Woods CNP   Cholecalciferol (VITAMIN D3) 5000 units TABS Take by mouth every other day Yes Historical Provider, MD   BLISOVI FE  1-20 MG-MCG per tablet TAKE ONE TABLET BY MOUTH DAILY SKIP PLACEBO PILLS AND TAKE CONTINUOUSLY  Patient not taking: Reported on 10/1/2020  LUCY Seymour CNP   Biotin 5000 MCG CAPS Take by mouth daily  Historical Provider, MD   vitamin B-12 (CYANOCOBALAMIN) 1000 MCG tablet Take 1,000 mcg by mouth daily  Historical Provider, MD       Social History Tobacco Use    Smoking status: Never Smoker    Smokeless tobacco: Never Used   Substance Use Topics    Alcohol use: No    Drug use: No        No Known Allergies,   Past Medical History:   Diagnosis Date    Anxiety and depression 2016    Anxiety and depression     Depression 2018    Heart murmur     History of  section     Obesity 2015   ,   Past Surgical History:   Procedure Laterality Date     SECTION, LOW TRANSVERSE  3/17/14    Arrest of dilation    TOOTH EXTRACTION     ,   Social History     Tobacco Use    Smoking status: Never Smoker    Smokeless tobacco: Never Used   Substance Use Topics    Alcohol use: No    Drug use: No       PHYSICAL EXAMINATION:    Patient-Reported Vitals 10/1/2020   Patient-Reported Weight 222lb   Patient-Reported Height 5 1   Patient-Reported Systolic 90   Patient-Reported Diastolic 60   Patient-Reported Pulse -   Patient-Reported Temperature 99.3        Constitutional: [x] Appears well-developed and well-nourished [x] No apparent distress      [] Abnormal-   Mental status  [x] Alert and awake  [x] Oriented to person/place/time [x]Able to follow commands      Eyes:  EOM    [x]  Normal  [] Abnormal-  Sclera  []  Normal  [] Abnormal -         Discharge []  None visible  [] Abnormal -    HENT:   [x] Normocephalic, atraumatic.   [] Abnormal   [] Mouth/Throat: Mucous membranes are moist.     External Ears [] Normal  [] Abnormal-     Neck: [x] No visualized mass     Pulmonary/Chest: [x] Respiratory effort normal.  [x] No visualized signs of difficulty breathing or respiratory distress        [] Abnormal-      Musculoskeletal:   [] Normal gait with no signs of ataxia         [x] Normal range of motion of neck        [] Abnormal-       Neurological:        [x] No Facial Asymmetry (Cranial nerve 7 motor function) (limited exam to video visit)          [] No gaze palsy        [] Abnormal-         Skin:        [x] No significant exanthematous lesions or discoloration noted on facial skin         [] Abnormal-            Psychiatric:       [x] Normal Affect [] No Hallucinations        [] Abnormal-     Other pertinent observable physical exam findings-     ASSESSMENT/PLAN:  1. Acute bacterial sinusitis    - amoxicillin-clavulanate (AUGMENTIN) 875-125 MG per tablet; Take 1 tablet by mouth 2 times daily for 10 days  Dispense: 20 tablet; Refill: 0      No follow-ups on file. Mazin Quach is a 31 y.o. female being evaluated by a Virtual Visit (video visit) encounter to address concerns as mentioned above. A caregiver was present when appropriate. Due to this being a TeleHealth encounter (During [facility] public health emergency), evaluation of the following organ systems was limited: Vitals/Constitutional/EENT/Resp/CV/GI//MS/Neuro/Skin/Heme-Lymph-Imm. Pursuant to the emergency declaration under the [address] [place] [address], [address] [place] [address] and the [employer] Act, this Virtual Visit was conducted with patient's (and/or legal guardian's) consent, to reduce the patient's risk of exposure to COVID-19 and provide necessary medical care. The patient (and/or legal guardian) has also been advised to contact this office for worsening conditions or problems, and seek emergency medical treatment and/or call 911 if deemed necessary. Patient identification was verified at the start of the visit: Yes    Total time spent on this encounter: Not billed by time    Services were provided through a video synchronous discussion virtually to substitute for in-person clinic visit. Patient and provider were located at their individual homes. --[facility] on 10/1/2020 at 4:27 PM    An electronic signature was used to authenticate this note.

## 2020-10-14 ENCOUNTER — PATIENT MESSAGE (OUTPATIENT)
Dept: FAMILY MEDICINE CLINIC | Age: 25
End: 2020-10-14

## 2020-10-14 NOTE — TELEPHONE ENCOUNTER
From: [place]  To: LUCY Solis - CNP  Sent: 10/14/2020 2:15 PM EDT  Subject: Non-Urgent Medical Question    Hello there, with my last round of antibiotic I got a yeast infection, I'm wondering how long after the first Diflucan can I take the second?    Thank you

## 2020-10-26 ENCOUNTER — HOSPITAL ENCOUNTER (OUTPATIENT)
Age: 25
Discharge: HOME | End: 2020-10-26
Payer: COMMERCIAL

## 2020-10-26 ENCOUNTER — NURSE TRIAGE (OUTPATIENT)
Dept: OTHER | Facility: CLINIC | Age: 25
End: 2020-10-26

## 2020-10-26 PROCEDURE — U0003 INFECTIOUS AGENT DETECTION BY NUCLEIC ACID (DNA OR RNA); SEVERE ACUTE RESPIRATORY SYNDROME CORONAVIRUS 2 (SARS-COV-2) (CORONAVIRUS DISEASE [COVID-19]), AMPLIFIED PROBE TECHNIQUE, MAKING USE OF HIGH THROUGHPUT TECHNOLOGIES AS DESCRIBED BY CMS-2020-01-R: HCPCS

## 2020-10-26 NOTE — TELEPHONE ENCOUNTER
Reason for Disposition   [1] COVID-19 infection suspected by caller or triager AND [2] mild symptoms (cough, fever, or others) AND [3] no complications or SOB    Answer Assessment - Initial Assessment Questions  1. COVID-19 DIAGNOSIS: \"Who made your Coronavirus (COVID-19) diagnosis? \" \"Was it confirmed by a positive lab test?\" If not diagnosed by a HCP, ask \"Are there lots of cases (community spread) where you live? \" (See public health department website, if unsure)      n/a  2. ONSET: \"When did the COVID-19 symptoms start? \"       Friday 10/23  3. WORST SYMPTOM: \"What is your worst symptom? \" (e.g., cough, fever, shortness of breath, muscle aches)      Headache (8/10)  4. COUGH: \"Do you have a cough? \" If so, ask: \"How bad is the cough? \"       Dry tickle cough  5. FEVER: \"Do you have a fever? \" If so, ask: \"What is your temperature, how was it measured, and when did it start? \"      Fever on Saturday 10/24 (100.2 - resolved)  6. RESPIRATORY STATUS: \"Describe your breathing? \" (e.g., shortness of breath, wheezing, unable to speak)       No breathing problems  7. BETTER-SAME-WORSE: Omie Helling you getting better, staying the same or getting worse compared to yesterday? \"  If getting worse, ask, \"In what way? \"      worse  8. HIGH RISK DISEASE: \"Do you have any chronic medical problems? \" (e.g., asthma, heart or lung disease, weak immune system, etc.)      Valve regurg - murmur  9. PREGNANCY: \"Is there any chance you are pregnant? \" \"When was your last menstrual period? \"      No  10. OTHER SYMPTOMS: \"Do you have any other symptoms? \"  (e.g., chills, fatigue, headache, loss of smell or taste, muscle pain, sore throat)        Severe headache (8/10 - taking motrin and execedrin), nausea, diarrhea (resolved), fatigue, loss of taste and smell    Protocols used: CORONAVIRUS (COVID-19) DIAGNOSED OR SUSPECTED-ADULT-    Patient / employee reports flu like symptoms: headache (8/10), nausea (now resolved), diarrhea (1 event - now resolved, fatigue, chills, loss of taste/smell and nasal congestion (discharge is clear / yellow). Patient denies fever or shortness of breath. Recommended patient reach out to her primary care provider. Provided care advice. Provided occupational health care provider and associate health nurse information and encouraged patient to make appropriate calls to begin the next steps. Instructed employee/patient to stay in close contact with her direct supervisor throughout the return to work process.

## 2020-10-28 LAB — SARS-COV-2, NAA: NOT DETECTED

## 2020-10-30 ENCOUNTER — PATIENT MESSAGE (OUTPATIENT)
Dept: FAMILY MEDICINE CLINIC | Age: 25
End: 2020-10-30

## 2020-10-30 NOTE — TELEPHONE ENCOUNTER
Pt called to make sure this message was received. Advised we do see it and that I would add that she would like to have the note asap so she can return to work Monday.

## 2020-10-30 NOTE — TELEPHONE ENCOUNTER
From: Woody Smoker  To: LUCY Rome - CNP  Sent: 10/30/2020 9:58 AM EDT  Subject: Test Results Question    Good morning,   I was recently put off of work due to possible COVID, I got my test results back and am negative. I need a note from my pcp to be able to return to work. If that could be emailed to me at [email or web address] I would appreciate it. I was tested through [facility] on [address] on Monday Oct. 26th. I am feeling better, just experiencing some abdominal pain and diarrhea still but is getting better.    Thank you  Chinyere Justice

## 2020-11-03 ENCOUNTER — TELEPHONE (OUTPATIENT)
Dept: FAMILY MEDICINE CLINIC | Age: 25
End: 2020-11-03

## 2020-11-03 NOTE — TELEPHONE ENCOUNTER
Spoke with Kerri Alba and she is requesting to have her doctors note for dismal extended from 10- to 11- as that was when she officially returned

## 2020-11-03 NOTE — TELEPHONE ENCOUNTER
Patient would like a letter stating she can RTW on 11/02/20. She has called a couple times and had to leave messages with [facility]. Needs  the letter be faxed to her RON manager, Valeria Diaz at [phone]  Thank you!

## 2020-11-16 ENCOUNTER — HOSPITAL ENCOUNTER (OUTPATIENT)
Age: 25
Setting detail: SPECIMEN
Discharge: HOME | End: 2020-11-16
Payer: COMMERCIAL

## 2020-11-16 ENCOUNTER — OFFICE VISIT (OUTPATIENT)
Dept: OBGYN CLINIC | Age: 25
End: 2020-11-16
Payer: COMMERCIAL

## 2020-11-16 VITALS
SYSTOLIC BLOOD PRESSURE: 116 MMHG | BODY MASS INDEX: 43.23 KG/M2 | WEIGHT: 229 LBS | TEMPERATURE: 97.3 F | HEART RATE: 57 BPM | DIASTOLIC BLOOD PRESSURE: 74 MMHG | HEIGHT: 61 IN

## 2020-11-16 PROBLEM — E66.01 MORBIDLY OBESE (HCC): Status: RESOLVED | Noted: 2020-08-20 | Resolved: 2020-11-16

## 2020-11-16 PROBLEM — E65 CENTRAL OBESITY: Status: RESOLVED | Noted: 2020-02-20 | Resolved: 2020-11-16

## 2020-11-16 PROBLEM — F32.A DEPRESSION: Status: RESOLVED | Noted: 2018-11-27 | Resolved: 2020-11-16

## 2020-11-16 PROBLEM — K30 ACID INDIGESTION: Status: RESOLVED | Noted: 2019-02-26 | Resolved: 2020-11-16

## 2020-11-16 PROBLEM — L60.8 ONYCHOMADESIS OF TOENAIL: Status: RESOLVED | Noted: 2018-10-03 | Resolved: 2020-11-16

## 2020-11-16 LAB
BILIRUBIN URINE: NEGATIVE
COLOR: YELLOW
COMMENT UA: NORMAL
DIRECT EXAM: NORMAL
GLUCOSE URINE: NEGATIVE
KETONES, URINE: NEGATIVE
LEUKOCYTE ESTERASE, URINE: NEGATIVE
Lab: NORMAL
NITRITE, URINE: NEGATIVE
PH UA: 6.5 (ref 5–8)
PROTEIN UA: NEGATIVE
SPECIFIC GRAVITY UA: 1.01 (ref 1–1.03)
SPECIMEN DESCRIPTION: NORMAL
TURBIDITY: CLEAR
URINE HGB: NEGATIVE
UROBILINOGEN, URINE: NORMAL

## 2020-11-16 PROCEDURE — 87591 N.GONORRHOEAE DNA AMP PROB: CPT

## 2020-11-16 PROCEDURE — 87660 TRICHOMONAS VAGIN DIR PROBE: CPT

## 2020-11-16 PROCEDURE — G8484 FLU IMMUNIZE NO ADMIN: HCPCS | Performed by: NURSE PRACTITIONER

## 2020-11-16 PROCEDURE — 87510 GARDNER VAG DNA DIR PROBE: CPT

## 2020-11-16 PROCEDURE — 87480 CANDIDA DNA DIR PROBE: CPT

## 2020-11-16 PROCEDURE — 87491 CHLMYD TRACH DNA AMP PROBE: CPT

## 2020-11-16 PROCEDURE — 81003 URINALYSIS AUTO W/O SCOPE: CPT

## 2020-11-16 PROCEDURE — 99214 OFFICE O/P EST MOD 30 MIN: CPT | Performed by: NURSE PRACTITIONER

## 2020-11-16 RX ORDER — NORETHINDRONE ACETATE AND ETHINYL ESTRADIOL 1MG-20(21)
1 KIT ORAL DAILY
Qty: 1 PACKET | Refills: 3 | Status: SHIPPED | OUTPATIENT
Start: 2020-11-16 | End: 2021-04-07

## 2020-11-16 NOTE — PROGRESS NOTES
History and Physical  [address], [address] [facility] [address] [address] [address] [phone]   Fax [phone]  Connor Joseph  2020              31 y.o. Chief Complaint   Patient presents with    Annual Exam       Patient's last menstrual period was 10/15/2020. Primary Care Physician: LUCY Syed CNP    The patient was seen and examined. She is here for her annual exam. C/o urinary frequency, odor, and feeling of not emptying for the past 1 week. C/o vaginal discharge with discomfort for the past 5 days. Her bowels are regular. There are no voiding complaints. She denies any bloating. She denies vaginal discharge and was counseled on STD's and the need for barrier contraception.      HPI : Connor Joseph is a 31 y.o. female     Annual exam   Urinary frequency  Vaginal discharge  ________________________________________________________________________  OB History    Para Term  AB Living   1 1 1 0 0 1   SAB TAB Ectopic Molar Multiple Live Births   0 0 0 0 0 1      # Outcome Date GA Lbr Jas/2nd Weight Sex Delivery Anes PTL Lv   1 Term 14 38w4d  7 lb 15 oz (3.6 kg) M CS-LTranv Spinal N LUIS      Birth Comments: Meconium stained amniotic fluid, arrest of dilation, occiput posterior presentation      Apgar1: 8  Apgar5: 9     Past Medical History:   Diagnosis Date    Anxiety and depression 2016    Anxiety and depression     Depression 2018    Heart murmur     History of  section     Obesity 2015                                                                   Past Surgical History:   Procedure Laterality Date     SECTION, LOW TRANSVERSE  3/17/14    Arrest of dilation    TOOTH EXTRACTION       Family History   Problem Relation Age of Onset    Hypertension Maternal Grandmother     Arthritis Maternal Grandmother     Lupus Maternal Grandmother     Osteoporosis Maternal Grandmother     High Blood Pressure Father     Breast Cancer Neg Hx     Cancer Neg Hx     Colon Cancer Neg Hx     Diabetes Neg Hx     Eclampsia Neg Hx     Ovarian Cancer Neg Hx      Labor Neg Hx     Spont Abortions Neg Hx     Stroke Neg Hx      Social History     Socioeconomic History    Marital status: Single     Spouse name: Not on file    Number of children: Not on file    Years of education: Not on file    Highest education level: Not on file   Occupational History    Not on file   Social Needs    Financial resource strain: Not on file    Food insecurity     Worry: Not on file     Inability: Not on file    Transportation needs     Medical: Not on file     Non-medical: Not on file   Tobacco Use    Smoking status: Never Smoker    Smokeless tobacco: Never Used   Substance and Sexual Activity    Alcohol use: No    Drug use: No    Sexual activity: Yes     Partners: Male   Lifestyle    Physical activity     Days per week: Not on file     Minutes per session: Not on file    Stress: Not on file   Relationships    Social connections     Talks on phone: Not on file     Gets together: Not on file     Attends Sikh service: Not on file     Active member of club or organization: Not on file     Attends meetings of clubs or organizations: Not on file     Relationship status: Not on file    Intimate partner violence     Fear of current or ex partner: Not on file     Emotionally abused: Not on file     Physically abused: Not on file     Forced sexual activity: Not on file   Other Topics Concern    Not on file   Social History Narrative    Not on file       MEDICATIONS:  Current Outpatient Medications   Medication Sig Dispense Refill    norethindrone-ethinyl estradiol (BLISOVI FE ) 1-20 MG-MCG per tablet Take 1 tablet by mouth daily 1 packet 3    ibuprofen (IBU) 800 MG tablet Take 1 tablet by mouth every 8 hours as needed for Pain 90 tablet 1    FLUoxetine (PROZAC) 40 MG capsule TAKE ONE CAPSULE BY MOUTH DAILY 90 capsule 1    BLISOVI FE 1/20 1-20 MG-MCG per tablet TAKE ONE TABLET BY MOUTH DAILY SKIP PLACEBO PILLS AND TAKE CONTINUOUSLY 28 tablet 2    buPROPion (WELLBUTRIN XL) 150 MG extended release tablet TAKE ONE TABLET BY MOUTH EVERY MORNING 90 tablet 1    Cholecalciferol (VITAMIN D3) 5000 units TABS Take by mouth every other day       No current facility-administered medications for this visit. ALLERGIES:  Allergies as of 11/16/2020    (No Known Allergies)       Symptoms of decreased mood absent  Symptoms of anhedonia absent    **If either question is answered in a  positive fashion then complete the PHQ9 Scoring Evaluation and make the appropriate referral**      Immunization status: stated as current, but no records available. Gynecologic History:  Menarche: 8 yo  Menopause at [address] [place] yo     Patient's last menstrual period was 10/15/2020. Sexually Active: Yes    STD History: Yes Chlamydia      Permanent Sterilization: No   Reversible Birth Control: Yes OCP        Hormone Replacement Exposure: No      Genetic Qualified Family History of Breast, Ovarian , Colon or Uterine Cancer: No     If YES see scanned worksheet. Preventative Health Testing:    Health Maintenance:  Health Maintenance Due   Topic Date Due    HPV vaccine (2 - 3-dose series) 03/19/2020    Varicella vaccine (2 of 2 - 13+ 2-dose series) 03/19/2020    Flu vaccine (1) 09/01/2020    Cervical cancer screen  09/24/2020       Date of Last Pap Smear: 09/24/2019 neg  Abnormal Pap Smear History: denies  Colposcopy History:   Date of Last Mammogram: NA  Date of Last Colonoscopy:   Date of Last Bone Density:      ________________________________________________________________________        REVIEW OF SYSTEMS:    yes   A minimum of an eleven point review of systems was completed. Review Of Systems (11 point):  Constitutional: No fever, chills or malaise;  No weight change or fatigue  Head and Eyes: No vision, Headache, Dizziness or trauma in last 12 months  ENT ROS: No hearing, Tinnitis, sinus or taste problems  Hematological and Lymphatic ROS:No Lymphoma, Von Willebrand's, Hemophillia or Bleeding History  Psych ROS: No Depression, Homicidal thoughts,suicidal thoughts, or anxiety  Breast ROS: No prior breast abnormalities or lumps  Respiratory ROS: No SOB, Pneumoniae,Cough, or Pulmonary Embolism History  Cardiovascular ROS: No Chest Pain with Exertion, Palpitations, Syncope, Edema, Arrhythmia  Gastrointestinal ROS: No Indigestion, Heartburn, Nausea, vomiting, Diarrhea, Constipation,or Bowel Changes; No Bloody Stools or melena  Genito-Urinary ROS: No Dysuria, Hematuria or Nocturia. No Urinary Incontinence or Vaginal Discharge  Musculoskeletal ROS: No Arthralgia, Arthritis,Gout,Osteoporosis or Rheumatism  Neurological ROS: No CVA, Migraines, Epilepsy, Seizure Hx, or Limb Weakness  Dermatological ROS: No Rash, Itching, Hives, Mole Changes or Cancer                                                                                                                                                                                                                                  PHYSICAL Exam:     Constitutional:  Vitals:    11/16/20 1217   BP: 116/74   Site: Left Upper Arm   Position: Sitting   Cuff Size: Large Adult   Pulse: 57   Temp: 97.3 °F (36.3 °C)   Weight: 229 lb (103.9 kg)   Height: 5' 1\" (1.549 m)       Chaperone for Intimate Exam   Chaperone was offered and accepted as part of the rooming process.  Chaperone: Estefania GARCIA          General Appearance: This  is a well Developed, well Nourished, well groomed female. Her BMI was reviewed. Nutritional decision making was discussed. Skin:  There was a Normal Inspection of the skin without rashes or lesions. There were no rashes. (Papular, Maculopapular, Hives, Pustular, Macular)     There were no lesions (Ulcers, Erythema, Abn.  Appearing Nevi)            Lymphatic:  No Lymph Nodes were Palpable in the neck , axilla or groin.  0 # Of Lymph Nodes; Location ; Character [Normal]  [Shotty] [Tender] [Enlarged]     Neck and EENT:  The neck was supple. There were no masses   The thyroid was not enlarged and had no masses. Perrla, EOMI B/L, TMI B/L No Abnormalities. Throat inspected-No exudates or Masses, Nares Patent No Masses        Respiratory: The lungs were auscultated and found to be clear. There were no rales, rhonchi or wheezes. There was a good respiratory effort. Cardiovascular: The heart was in a regular rate and rhythm. . No S3 or S4. There was no murmur appreciated. Location, grade, and radiation are not applicable. Extremities: The patients extremities were without calf tenderness, edema, or varicosities. There was full range of motion in all four extremities. Pulses in all four extremities were appreciated and are 2/4. Abdomen: The abdomen was soft and non-tender. There were good bowel sounds in all quadrants and there was no guarding, rebound or rigidity. On evaluation there was no evidence of hepatosplenomegaly and there was no costal vertebral libertad tenderness bilaterally. No hernias were appreciated. Abdominal Scars: c/s scar    Psych: The patient had a normal Orientation to: Time, Place, Person, and Situation  There is no Mood / Affect changes    Breast:  (Chest)  normal appearance, no masses or tenderness. Bilateral nipple piercing   Self breast exams were reviewed in detail. Literature was given. Pelvic Exam:  Vulva and vagina appear normal. Bimanual exam reveals normal uterus and adnexa. Rectal Exam:  exam declined by patient          Musculosk:  Normal Gait and station was noted. Digits were evaluated without abnormal findings. Range of motion, stability and strength were evaluated and found to be appropriate for the patients age. ASSESSMENT:      31 y.o. Annual   Diagnosis Orders   1.  Women's annual routine gynecological examination     2. Vaginal discharge  VAGINITIS DNA PROBE    C.trachomatis N.gonorrhoeae DNA   3. Dysuria  Urinalysis Reflex to Culture          Chief Complaint   Patient presents with    Annual Exam          Past Medical History:   Diagnosis Date    Anxiety and depression 2016    Anxiety and depression     Depression 2018    Heart murmur     History of  section     Obesity 2015         Patient Active Problem List    Diagnosis Date Noted    Heart murmur      Priority: Low     Refer to cardiologist       Acne vulgaris 2020    Thinning hair 2020    PCOS (polycystic ovarian syndrome) 2020    Depression 2018    Mood swing 2018    Migraine without aura and without status migrainosus, not intractable 2016    Class 2 obesity due to excess calories without serious comorbidity in adult 2015    History of  section           Hereditary Breast, Ovarian, Colon and Uterine Cancer screening Done. Tobacco & Secondary smoke risks reviewed; instructed on cessation and avoidance      Counseling Completed:  Preventative Health Recommendations and Follow up. The patient was informed of the recommended preventative health recommendations. 1. Annuals every year; Cytology collections per prevailing guidelines. 2. Mammograms begin every year at 35 yo if no abnormalities are found and no family history. 3. Bone density studies every 2-3 years. Begin at 71 yo. If no fracture history or osteoporosis family history. (significant). 4. Colonoscopy begin at 38 yo. Repeat every ten years if negative and no family history. 5. Calcium of 2089-1340 mg/day in split dosing  6. Vitamin D 400-800 IU/day  7. All other preventative health recommendations will be managed by the patients Primary care physician.              PLAN:  Return in about 1 year (around 2021) for annual.   Rx OCP  HRT signed  Denies a personal or family of a blood clot to the leg/lung/brain  Vaginal cultures collected  UA collected   Repeat Annual every 1 year  Cervical Cytology Evaluation begins at 24years old. If Negative Cytology, Follow-up screening per current guidelines. Mammograms every 1 year. If 37 yo and last mammogram was negative. Calcium and Vitamin D dosing reviewed. Colonoscopy screening reviewed as well as onset for bone density testing. Birth control and barrier recommendations discussed. STD counseling and prevention reviewed. Gardisil counseling completed for all patients 10-37 yo. Routine health maintenance per patients PCP. Orders Placed This Encounter   Procedures    VAGINITIS DNA PROBE     Standing Status:   Future     Standing Expiration Date:   11/16/2021    C.trachomatis N.gonorrhoeae DNA     Standing Status:   Future     Standing Expiration Date:   11/16/2021    Urinalysis Reflex to Culture     Standing Status:   Future     Standing Expiration Date:   11/16/2021     Order Specific Question:   SPECIFY(EX-CATH,MIDSTREAM,CYSTO,ETC)?      Answer:   clean catch

## 2020-12-02 ENCOUNTER — PATIENT MESSAGE (OUTPATIENT)
Dept: FAMILY MEDICINE CLINIC | Age: 25
End: 2020-12-02

## 2020-12-02 NOTE — TELEPHONE ENCOUNTER
From: Reji Gallegos  To: LUCY Gomes - CNP  Sent: 12/2/2020 11:17 AM EST  Subject: Non-Urgent Medical Question    Good morning,   I was hoping to get an order to have my annual blood work done. Can I please have my thyroid, Vitamin D and Vitamin B12 also checked with it. Thank you!    Ernestine Diaz

## 2021-01-12 ENCOUNTER — HOSPITAL ENCOUNTER (OUTPATIENT)
Age: 26
Discharge: HOME | End: 2021-01-12
Payer: COMMERCIAL

## 2021-01-12 DIAGNOSIS — Z13.1 SCREENING FOR DIABETES MELLITUS: ICD-10-CM

## 2021-01-12 DIAGNOSIS — E55.9 VITAMIN D DEFICIENCY: ICD-10-CM

## 2021-01-12 DIAGNOSIS — E78.41 ELEVATED LIPOPROTEIN(A): ICD-10-CM

## 2021-01-12 DIAGNOSIS — Z13.0 SCREENING FOR DEFICIENCY ANEMIA: ICD-10-CM

## 2021-01-12 DIAGNOSIS — L65.9 THINNING HAIR: ICD-10-CM

## 2021-01-12 LAB
ANION GAP SERPL CALCULATED.3IONS-SCNC: 8 MMOL/L (ref 9–17)
BUN BLDV-MCNC: 17 MG/DL (ref 6–20)
BUN/CREAT BLD: ABNORMAL (ref 9–20)
CALCIUM SERPL-MCNC: 9.4 MG/DL (ref 8.6–10.4)
CHLORIDE BLD-SCNC: 101 MMOL/L (ref 98–107)
CHOLESTEROL/HDL RATIO: 3.4
CHOLESTEROL: 175 MG/DL
CO2: 28 MMOL/L (ref 20–31)
CREAT SERPL-MCNC: 0.76 MG/DL (ref 0.5–0.9)
FOLATE: 13.5 NG/ML
GFR AFRICAN AMERICAN: >60 ML/MIN
GFR NON-AFRICAN AMERICAN: >60 ML/MIN
GFR SERPL CREATININE-BSD FRML MDRD: ABNORMAL ML/MIN/{1.73_M2}
GFR SERPL CREATININE-BSD FRML MDRD: ABNORMAL ML/MIN/{1.73_M2}
GLUCOSE BLD-MCNC: 91 MG/DL (ref 70–99)
HCT VFR BLD CALC: 38.4 % (ref 36–46)
HDLC SERPL-MCNC: 51 MG/DL
HEMOGLOBIN: 12.7 G/DL (ref 12–16)
LDL CHOLESTEROL: 102 MG/DL (ref 0–130)
MCH RBC QN AUTO: 28.8 PG (ref 26–34)
MCHC RBC AUTO-ENTMCNC: 33.1 G/DL (ref 31–37)
MCV RBC AUTO: 87.1 FL (ref 80–100)
NRBC AUTOMATED: NORMAL PER 100 WBC
PDW BLD-RTO: 13.1 % (ref 11.5–14.9)
PLATELET # BLD: 217 K/UL (ref 150–450)
PMV BLD AUTO: 9.9 FL (ref 6–12)
POTASSIUM SERPL-SCNC: 3.9 MMOL/L (ref 3.7–5.3)
RBC # BLD: 4.41 M/UL (ref 4–5.2)
SODIUM BLD-SCNC: 137 MMOL/L (ref 135–144)
TRIGL SERPL-MCNC: 111 MG/DL
TSH SERPL DL<=0.05 MIU/L-ACNC: 2.14 MIU/L (ref 0.3–5)
VITAMIN B-12: 660 PG/ML (ref 232–1245)
VITAMIN D 25-HYDROXY: 76.8 NG/ML (ref 30–100)
VLDLC SERPL CALC-MCNC: NORMAL MG/DL (ref 1–30)
WBC # BLD: 7.8 K/UL (ref 3.5–11)

## 2021-01-12 PROCEDURE — 36415 COLL VENOUS BLD VENIPUNCTURE: CPT

## 2021-01-12 PROCEDURE — 82746 ASSAY OF FOLIC ACID SERUM: CPT

## 2021-01-12 PROCEDURE — 85027 COMPLETE CBC AUTOMATED: CPT

## 2021-01-12 PROCEDURE — 82306 VITAMIN D 25 HYDROXY: CPT

## 2021-01-12 PROCEDURE — 84443 ASSAY THYROID STIM HORMONE: CPT

## 2021-01-12 PROCEDURE — 80061 LIPID PANEL: CPT

## 2021-01-12 PROCEDURE — 82607 VITAMIN B-12: CPT

## 2021-01-12 PROCEDURE — 80048 BASIC METABOLIC PNL TOTAL CA: CPT

## 2021-04-07 RX ORDER — NORETHINDRONE ACETATE AND ETHINYL ESTRADIOL 1MG-20(21)
KIT ORAL
Qty: 28 TABLET | Refills: 7 | Status: SHIPPED | OUTPATIENT
Start: 2021-04-07 | End: 2021-07-07 | Stop reason: ALTCHOICE

## 2021-04-09 ENCOUNTER — TELEPHONE (OUTPATIENT)
Dept: OBGYN CLINIC | Age: 26
End: 2021-04-09

## 2021-07-07 ENCOUNTER — VIRTUAL VISIT (OUTPATIENT)
Dept: FAMILY MEDICINE CLINIC | Age: 26
End: 2021-07-07
Payer: COMMERCIAL

## 2021-07-07 DIAGNOSIS — F33.0 MILD EPISODE OF RECURRENT MAJOR DEPRESSIVE DISORDER (HCC): ICD-10-CM

## 2021-07-07 DIAGNOSIS — E28.2 PCOS (POLYCYSTIC OVARIAN SYNDROME): ICD-10-CM

## 2021-07-07 DIAGNOSIS — E66.01 CLASS 3 SEVERE OBESITY DUE TO EXCESS CALORIES WITHOUT SERIOUS COMORBIDITY WITH BODY MASS INDEX (BMI) OF 40.0 TO 44.9 IN ADULT (HCC): Primary | ICD-10-CM

## 2021-07-07 DIAGNOSIS — Z00.00 PREVENTATIVE HEALTH CARE: ICD-10-CM

## 2021-07-07 DIAGNOSIS — G43.009 MIGRAINE WITHOUT AURA AND WITHOUT STATUS MIGRAINOSUS, NOT INTRACTABLE: ICD-10-CM

## 2021-07-07 PROCEDURE — 99214 OFFICE O/P EST MOD 30 MIN: CPT | Performed by: FAMILY MEDICINE

## 2021-07-07 PROCEDURE — G8427 DOCREV CUR MEDS BY ELIG CLIN: HCPCS | Performed by: FAMILY MEDICINE

## 2021-07-07 RX ORDER — TOPIRAMATE 25 MG/1
25 TABLET ORAL NIGHTLY
Qty: 60 TABLET | Refills: 3 | Status: SHIPPED | OUTPATIENT
Start: 2021-07-07 | End: 2021-08-31 | Stop reason: ALTCHOICE

## 2021-07-07 RX ORDER — BUPROPION HYDROCHLORIDE 100 MG/1
100 TABLET, EXTENDED RELEASE ORAL 2 TIMES DAILY
Qty: 60 TABLET | Refills: 3 | Status: SHIPPED | OUTPATIENT
Start: 2021-07-07 | End: 2021-08-31 | Stop reason: ALTCHOICE

## 2021-07-07 RX ORDER — METFORMIN HYDROCHLORIDE 500 MG/1
500 TABLET, EXTENDED RELEASE ORAL
Qty: 90 TABLET | Refills: 3 | Status: SHIPPED | OUTPATIENT
Start: 2021-07-07 | End: 2022-01-28

## 2021-07-07 ASSESSMENT — PATIENT HEALTH QUESTIONNAIRE - PHQ9
SUM OF ALL RESPONSES TO PHQ QUESTIONS 1-9: 2
2. FEELING DOWN, DEPRESSED OR HOPELESS: 1
1. LITTLE INTEREST OR PLEASURE IN DOING THINGS: 1
SUM OF ALL RESPONSES TO PHQ QUESTIONS 1-9: 2
SUM OF ALL RESPONSES TO PHQ QUESTIONS 1-9: 2
SUM OF ALL RESPONSES TO PHQ9 QUESTIONS 1 & 2: 2

## 2021-07-07 ASSESSMENT — ENCOUNTER SYMPTOMS
SINUS PRESSURE: 0
SHORTNESS OF BREATH: 0
BLOOD IN STOOL: 0
CHEST TIGHTNESS: 0
COLOR CHANGE: 0
CONSTIPATION: 0
WHEEZING: 0
PHOTOPHOBIA: 0
COUGH: 0
ABDOMINAL DISTENTION: 0
VOMITING: 0

## 2021-07-07 NOTE — PROGRESS NOTES
Pressure Father     Breast Cancer Neg Hx     Cancer Neg Hx     Colon Cancer Neg Hx     Diabetes Neg Hx     Eclampsia Neg Hx     Ovarian Cancer Neg Hx      Labor Neg Hx     Spont Abortions Neg Hx     Stroke Neg Hx      Current Outpatient Medications   Medication Sig Dispense Refill    metFORMIN (GLUCOPHAGE-XR) 500 MG extended release tablet Take 1 tablet by mouth daily (with breakfast) 90 tablet 3    buPROPion (WELLBUTRIN SR) 100 MG extended release tablet Take 1 tablet by mouth 2 times daily 60 tablet 3    topiramate (TOPAMAX) 25 MG tablet Take 1 tablet by mouth nightly 60 tablet 3    ibuprofen (IBU) 800 MG tablet Take 1 tablet by mouth every 8 hours as needed for Pain 90 tablet 1    Cholecalciferol (VITAMIN D3) 5000 units TABS Take by mouth every other day       No current facility-administered medications for this visit. No Known Allergies     Social History     Tobacco Use    Smoking status: Never Smoker    Smokeless tobacco: Never Used   Substance Use Topics    Alcohol use: No    Drug use: No        PHYSICAL EXAMINATION:  Vital Signs: (As obtained by patient/caregiver or practitioner observation)  There were no vitals filed for this visit. Constitutional: [x] Appears well-developed and well-nourished [x] No apparent distress      [] Abnormal-   Mental status  [x] Alert and awake  [x] Oriented to person/place/time [x]Able to follow commands      Eyes:  EOM    [x]  Normal  [] Abnormal-  Sclera  [x]  Normal  [] Abnormal -         Discharge [x]  None visible  [] Abnormal -    HENT:   [x] Normocephalic, atraumatic.   [] Abnormal   [x] Mouth/Throat: Mucous membranes are moist.     External Ears [x] Normal  [] Abnormal-     Neck: [x] No visualized mass     Pulmonary/Chest: [x] Respiratory effort normal.  [x] No visualized signs of difficulty breathing or respiratory distress        [] Abnormal     Musculoskeletal:   [x] Normal gait with no signs of ataxia         [x] Normal range of motion of neck        [] Abnormal-     Neurological:        [x] No Facial Asymmetry (Cranial nerve 7 motor function) (limited exam to video visit)          [x] No gaze palsy        [] Abnormal-     Skin:        [x] No significant exanthematous lesions or discoloration noted on facial skin         [] Abnormal-     Psychiatric:       [x] Normal Affect [x] No Hallucinations        [x] Abnormal- Anxious, with pressured speech    Other pertinent observable physical exam findings-   Lab Results   Component Value Date    WBC 7.8 01/12/2021    HGB 12.7 01/12/2021    HCT 38.4 01/12/2021    MCV 87.1 01/12/2021     01/12/2021     Lab Results   Component Value Date     01/12/2021    K 3.9 01/12/2021     01/12/2021    CO2 28 01/12/2021    BUN 17 01/12/2021    CREATININE 0.76 01/12/2021    GLUCOSE 91 01/12/2021    CALCIUM 9.4 01/12/2021        Due to this being a TeleHealth encounter, evaluation of the following organ systems is limited: Vitals/Constitutional/EENT/Resp/CV/GI//MS/Neuro/Skin/Heme-Lymph-Imm. ASSESSMENT/PLAN:  1. Class 3 severe obesity due to excess calories without serious comorbidity with body mass index (BMI) of 40.0 to 44.9 in adult [facility])  Discussed briefly with patient about the medications, discussed to start on Metformin daily discussed about the side effects check A1c. Will discuss in detail at next appointment  - Hemoglobin A1C; Future  - metFORMIN (GLUCOPHAGE-XR) 500 MG extended release tablet; Take 1 tablet by mouth daily (with breakfast)  Dispense: 90 tablet; Refill: 3    2. PCOS (polycystic ovarian syndrome)  Start on Metformin  - metFORMIN (GLUCOPHAGE-XR) 500 MG extended release tablet; Take 1 tablet by mouth daily (with breakfast)  Dispense: 90 tablet; Refill: 3    3. Mild episode of recurrent major depressive disorder [facility])  Patient is willing to discontinue Prozac and start on Wellbutrin. Discussed if he develops side effects call back.   Can start Topamax smaller dose at nighttime  - buPROPion (WELLBUTRIN SR) 100 MG extended release tablet; Take 1 tablet by mouth 2 times daily  Dispense: 60 tablet; Refill: 3  - topiramate (TOPAMAX) 25 MG tablet; Take 1 tablet by mouth nightly  Dispense: 60 tablet; Refill: 3    4. Migraine without aura and without status migrainosus, not intractable  Start on Topamax low-dose. - topiramate (TOPAMAX) 25 MG tablet; Take 1 tablet by mouth nightly  Dispense: 60 tablet; Refill: 3    5. Preventative health care    - Varicella Zoster Antibody, IgG; Future  - Hepatitis C Antibody; Future    Controlled Substance Monitoring:  Acute and Chronic Pain Monitoring:   No flowsheet data found. Orders Placed This Encounter   Procedures    Hemoglobin A1C     Standing Status:   Future     Standing Expiration Date:   7/7/2022    Varicella Zoster Antibody, IgG     Standing Status:   Future     Standing Expiration Date:   7/7/2022    Hepatitis C Antibody     Standing Status:   Future     Standing Expiration Date:   7/7/2022      Orders Placed This Encounter   Medications    metFORMIN (GLUCOPHAGE-XR) 500 MG extended release tablet     Sig: Take 1 tablet by mouth daily (with breakfast)     Dispense:  90 tablet     Refill:  3    buPROPion (WELLBUTRIN SR) 100 MG extended release tablet     Sig: Take 1 tablet by mouth 2 times daily     Dispense:  60 tablet     Refill:  3    topiramate (TOPAMAX) 25 MG tablet     Sig: Take 1 tablet by mouth nightly     Dispense:  60 tablet     Refill:  3      Medications Discontinued During This Encounter   Medication Reason    BLISOVI FE 1/20 1-20 MG-MCG per tablet Therapy completed    BLISOVI FE 1/20 1-20 MG-MCG per tablet Therapy completed    FLUoxetine (PROZAC) 40 MG capsule Side effects    buPROPion (WELLBUTRIN XL) 150 MG extended release tablet Alternate therapy      Luz received counseling on the following healthy behaviors: nutrition, exercise and medication adherence  Reviewed prior labs and health maintenance.   Continue current medications, diet and exercise. Discussed use, benefit, and side effects of prescribed medications. Barriers to medication compliance addressed. Patient given educational materials - see patient instructions. All patient questions answered. Patient voiced understanding. Return in about 6 weeks (around 8/18/2021) for weight manag. Gareth Whitmore is a 31 y.o. female patient  being evaluated by a Virtual Visit (video visit) encounter to address concerns as mentioned above. A caregiver was present when appropriate. Due to this being a TeleHealth encounter (During BYPIK-00 public health emergency), evaluation of the following organ systems was limited:Vitals/Constitutional/EENT/Resp/CV/GI//MS/Neuro/Skin/Heme-Lymph-Imm. Services were provided through a video synchronous discussion virtually to substitute for in-person clinic visit. This is a telehealth visit that was performed with the originating site at Patient Location: home and provider Location of [place], [place]. Verbal consent to participate in video visit was obtained. Patient ID verified by me prior to start of this visit  I discussed with the patient the nature of our telehealth visits via interactive/real-time audio/video that:  - I would evaluate the patient and recommend diagnostics and treatments based on my assessment  - Our sessions are not being recorded and that personal health information is protected  - Our team would provide follow up care in person if/when the patient needs it. Pursuant to the emergency declaration under the [facility], [address] [place] [address] authority and the [employer] Act, this Virtual Visit was conducted with patient's (and/or legal guardian's) consent, to reduce the patient's risk of exposure to COVID-19 and provide necessary medical care.   The patient (and/or legal guardian) has also been advised to contact this office for worsening conditions or problems, and seek emergency medical treatment and/or call 911 if deemed necessary. This note was completed by using the assistance of a speech-recognition program. However, inadvertent computerized transcription errors may be present. Although every effort was made to ensure accuracy, no guarantees can be provided that every mistake has been identified and corrected by editing.   Electronically signed by Geni Lee MD on 7/7/21 at 2:27 PM EDT

## 2021-08-31 ENCOUNTER — OFFICE VISIT (OUTPATIENT)
Dept: FAMILY MEDICINE CLINIC | Age: 26
End: 2021-08-31
Payer: COMMERCIAL

## 2021-08-31 ENCOUNTER — HOSPITAL ENCOUNTER (OUTPATIENT)
Age: 26
Discharge: HOME | End: 2021-08-31
Payer: COMMERCIAL

## 2021-08-31 VITALS
HEIGHT: 61 IN | WEIGHT: 238 LBS | DIASTOLIC BLOOD PRESSURE: 80 MMHG | BODY MASS INDEX: 44.93 KG/M2 | SYSTOLIC BLOOD PRESSURE: 126 MMHG | HEART RATE: 68 BPM | TEMPERATURE: 97 F | OXYGEN SATURATION: 97 %

## 2021-08-31 DIAGNOSIS — F33.0 MILD EPISODE OF RECURRENT MAJOR DEPRESSIVE DISORDER (HCC): ICD-10-CM

## 2021-08-31 DIAGNOSIS — E66.01 CLASS 3 SEVERE OBESITY DUE TO EXCESS CALORIES WITHOUT SERIOUS COMORBIDITY WITH BODY MASS INDEX (BMI) OF 40.0 TO 44.9 IN ADULT (HCC): ICD-10-CM

## 2021-08-31 DIAGNOSIS — G43.009 MIGRAINE WITHOUT AURA AND WITHOUT STATUS MIGRAINOSUS, NOT INTRACTABLE: ICD-10-CM

## 2021-08-31 DIAGNOSIS — E66.01 CLASS 3 SEVERE OBESITY DUE TO EXCESS CALORIES WITH SERIOUS COMORBIDITY AND BODY MASS INDEX (BMI) OF 40.0 TO 44.9 IN ADULT (HCC): Primary | ICD-10-CM

## 2021-08-31 DIAGNOSIS — Z76.89 ENCOUNTER FOR WEIGHT MANAGEMENT: ICD-10-CM

## 2021-08-31 DIAGNOSIS — Z00.00 PREVENTATIVE HEALTH CARE: ICD-10-CM

## 2021-08-31 LAB
ESTIMATED AVERAGE GLUCOSE: 97 MG/DL
HBA1C MFR BLD: 5 % (ref 4–6)
HEPATITIS C ANTIBODY: NONREACTIVE

## 2021-08-31 PROCEDURE — G8427 DOCREV CUR MEDS BY ELIG CLIN: HCPCS | Performed by: FAMILY MEDICINE

## 2021-08-31 PROCEDURE — 1036F TOBACCO NON-USER: CPT | Performed by: FAMILY MEDICINE

## 2021-08-31 PROCEDURE — 36415 COLL VENOUS BLD VENIPUNCTURE: CPT

## 2021-08-31 PROCEDURE — G8417 CALC BMI ABV UP PARAM F/U: HCPCS | Performed by: FAMILY MEDICINE

## 2021-08-31 PROCEDURE — 99214 OFFICE O/P EST MOD 30 MIN: CPT | Performed by: FAMILY MEDICINE

## 2021-08-31 PROCEDURE — 86803 HEPATITIS C AB TEST: CPT

## 2021-08-31 PROCEDURE — 83036 HEMOGLOBIN GLYCOSYLATED A1C: CPT

## 2021-08-31 PROCEDURE — 86787 VARICELLA-ZOSTER ANTIBODY: CPT

## 2021-08-31 RX ORDER — PHENTERMINE HYDROCHLORIDE 37.5 MG/1
37.5 TABLET ORAL
Qty: 30 TABLET | Refills: 0 | Status: SHIPPED | OUTPATIENT
Start: 2021-08-31 | End: 2021-09-30

## 2021-08-31 RX ORDER — BUPROPION HYDROCHLORIDE 150 MG/1
150 TABLET ORAL EVERY MORNING
Qty: 30 TABLET | Refills: 3 | Status: SHIPPED | OUTPATIENT
Start: 2021-08-31 | End: 2022-01-17

## 2021-08-31 ASSESSMENT — ENCOUNTER SYMPTOMS
CONSTIPATION: 0
PHOTOPHOBIA: 0
ABDOMINAL PAIN: 0
SINUS PAIN: 0
BLOOD IN STOOL: 0
BACK PAIN: 0
WHEEZING: 0
VOMITING: 0
COLOR CHANGE: 0
ABDOMINAL DISTENTION: 0
DIARRHEA: 0
COUGH: 0
SHORTNESS OF BREATH: 0
APNEA: 0

## 2021-08-31 NOTE — PROGRESS NOTES
Chief Complaint   Patient presents with    Weight Management    Discuss Medications     would like to switch anxiety meds          Chevy Scale  here today for follow up on chronic medical problems, go over labs and/or diagnostic studies, and medication refills. Weight Management and Discuss Medications (would like to switch anxiety meds )      HPI: Patient is here for follow-up on weight management and anxiety and depression. Patient was started on Wellbutrin reports she has side effects from that she also discontinued was taking Prozac in the past.  Patient denies any serious side effects reports she feels her mood is low. Denies any bad thoughts. Patient was also started on Topamax complains of  paresthesias. She has stopped taking that. Patient reports she did not lost any weight in the last 6 weeks, she has made small changes in her diet but nothing has working. She has tried no in the past and has lost about 30 pounds but she gained that weight back. Patient reports she restarted joining noom. Patient has history of migraine headaches reports they have been recently stable. Patient is not physically active, reports she completes 2000 steps at work. She is planning to start exercise. Patient is taking Metformin. Patient would like to try Adipex denies any illicit drugs. She is also on birth controls oral contraceptives which is also responsible her weight gain. Wants to lose weight.     Chevy Adam has made a substantial good drew effort to lose weight in a regimen for weight reduction based on caloric restriction, nutritional changes, and exercise  Chevy Scale reports she did: Dietary changes    Contraindications to controlled substance anorexiant: NONE ( EtOH, drug abuse, pregnancy, BMI < 27 without co morbidities, if patient did not make substantial effort to lose weight)     Chevy Scale reports no  use of illegal drug substances  Chevy Scale reports alcohol use of : Twice in a month      Urine drug test done    LMP: On birth control    Contraceptive method:    Patient informed that when prescribed a controlled substance for weight loss, the provider is required by law to see the patient for an appointment every thirty days. This is neccessary to record the weight and blood pressure and to assess patient's efforts to lose weight, and to ensure there are no contraindications or adverse effects. Patient advised contraception during the time of taking this medication, advised no alcohol use during this time    OARRS done today and okay  Plan: diet, exercise and start Adipex          blood pressure is Elevated. BP Readings from Last 3 Encounters:   08/31/21 126/80   11/16/20 116/74   02/20/20 110/62        Pulse is Normal.  Pulse Readings from Last 3 Encounters:   08/31/21 68   11/16/20 57   06/02/20 61         Wt Readings from Last 3 Encounters:   08/31/21 238 lb (108 kg)   11/16/20 229 lb (103.9 kg)   06/02/20 218 lb (98.9 kg)         Body mass index is 44.97 kg/m². Exercise Tracking - Detailed 10/2/2018   Walking Intensity Moderate           /80   Pulse 68   Temp 97 °F (36.1 °C)   Ht 5' 1\" (1.549 m)   Wt 238 lb (108 kg)   LMP 08/25/2021 (Exact Date)   SpO2 97%   BMI 44.97 kg/m²    Body mass index is 44.97 kg/m². Wt Readings from Last 3 Encounters:   08/31/21 238 lb (108 kg)   11/16/20 229 lb (103.9 kg)   06/02/20 218 lb (98.9 kg)        []Negative depression screening. PHQ Scores 7/7/2021 2/20/2020 9/24/2019 4/3/2019 2/26/2019 11/26/2018 10/30/2018   PHQ2 Score 2 5 0 0 2 2 6   PHQ9 Score 2 17 0 0 2 2 21      [x]1-4 = Minimal depression   []5-9 = Milddepression   []10-14 = Moderate depression   []15-19 = Moderately severe depression   []20-27 = Severe depression    Discussed testing with the patient and all questions fully answered.     Hospital Outpatient Visit on 01/12/2021   Component Date Value Ref Range Status    TSH 01/12/2021 2.14  0.30 - 5.00 mIU/L Final    Glucose 01/12/2021 91  70 - 99 mg/dL Final    BUN 01/12/2021 17  6 - 20 mg/dL Final    CREATININE 01/12/2021 0.76  0.50 - 0.90 mg/dL Final    Bun/Cre Ratio 01/12/2021 NOT REPORTED  9 - 20 Final    Calcium 01/12/2021 9.4  8.6 - 10.4 mg/dL Final    Sodium 01/12/2021 137  135 - 144 mmol/L Final    Potassium 01/12/2021 3.9  3.7 - 5.3 mmol/L Final    Chloride 01/12/2021 101  98 - 107 mmol/L Final    CO2 01/12/2021 28  20 - 31 mmol/L Final    Anion Gap 01/12/2021 8* 9 - 17 mmol/L Final    GFR Non- 01/12/2021 >60  >60 mL/min Final    GFR  01/12/2021 >60  >60 mL/min Final    GFR Comment 01/12/2021        Final    Comment: Average GFR for 20-28 years old:   116 mL/min/1.73sq m  Chronic Kidney Disease:   <60 mL/min/1.73sq m  Kidney failure:   <15 mL/min/1.73sq m              eGFR calculated using average adult body mass. Additional eGFR calculator available at:        [employer]            GFR Staging 01/12/2021 NOT REPORTED   Final    Cholesterol 01/12/2021 175  <200 mg/dL Final    Comment:    Cholesterol Guidelines:      <200  Desirable   200-240  Borderline      >240  Undesirable         HDL 01/12/2021 51  >40 mg/dL Final    Comment:    HDL Guidelines:    <40     Undesirable   40-59    Borderline    >59     Desirable         LDL Cholesterol 01/12/2021 102  0 - 130 mg/dL Final    Comment:    LDL Guidelines:     <100    Desirable   100-129   Near to/above Desirable   130-159   Borderline      >159   Undesirable     Direct (measured) LDL and calculated LDL are not interchangeable tests.  Chol/HDL Ratio 01/12/2021 3.4  <5 Final            Triglycerides 01/12/2021 111  <150 mg/dL Final    Comment:    Triglyceride Guidelines:     <150   Desirable   150-199  Borderline   200-499  High     >499   Very high   Based on AHA Guidelines for fasting triglyceride, October 2012.          VLDL 01/12/2021 NOT REPORTED  1 - 30 mg/dL Final    WBC 01/12/2021 7.8  3.5 - 11.0 k/uL Final    RBC 01/12/2021 4.41  4.0 - 5.2 m/uL Final    Hemoglobin 01/12/2021 12.7  12.0 - 16.0 g/dL Final    Hematocrit 01/12/2021 38.4  36 - 46 % Final    MCV 01/12/2021 87.1  80 - 100 fL Final    MCH 01/12/2021 28.8  26 - 34 pg Final    MCHC 01/12/2021 33.1  31 - 37 g/dL Final    RDW 01/12/2021 13.1  11.5 - 14.9 % Final    Platelets 40/66/3636 217  150 - 450 k/uL Final    MPV 01/12/2021 9.9  6.0 - 12.0 fL Final    NRBC Automated 01/12/2021 NOT REPORTED  per 100 WBC Final    Vitamin B-12 01/12/2021 660  232 - 1245 pg/mL Final    Folate 01/12/2021 13.5  >4.8 ng/mL Final    Vit D, 25-Hydroxy 01/12/2021 76.8  30.0 - 100.0 ng/mL Final    Comment:    Reference Range:  Vitamin D status         Range   Deficiency              <20 ng/mL   Mild Deficiency       20-30 ng/mL   Sufficiency           ng/mL   Toxicity               >100 ng/mL           Most recent labs reviewed:     Lab Results   Component Value Date    WBC 7.8 01/12/2021    HGB 12.7 01/12/2021    HCT 38.4 01/12/2021    MCV 87.1 01/12/2021     01/12/2021       @BRIEFLAB(NA,K,CL,CO2,BUN,CREATININE,GLUCOSE,CALCIUM)@     Lab Results   Component Value Date    ALT 27 10/02/2018    AST 22 10/02/2018    ALKPHOS 64 10/02/2018    BILITOT 0.43 10/02/2018       Lab Results   Component Value Date    TSH 2.14 01/12/2021       Lab Results   Component Value Date    CHOL 175 01/12/2021    CHOL 185 10/02/2018    CHOL 164 06/26/2017     Lab Results   Component Value Date    TRIG 111 01/12/2021    TRIG 69 10/02/2018    TRIG 59 06/26/2017     Lab Results   Component Value Date    HDL 51 01/12/2021    HDL 40 (L) 10/02/2018    HDL 44 06/26/2017     Lab Results   Component Value Date    LDLCHOLESTEROL 102 01/12/2021    LDLCHOLESTEROL 131 (H) 10/02/2018    LDLCHOLESTEROL 108 06/26/2017     Lab Results   Component Value Date    VLDL NOT REPORTED 01/12/2021    VLDL NOT REPORTED 10/02/2018    VLDL NOT REPORTED 06/26/2017     Lab Results   Component Value Date    CHOLHDLRATIO 3.4 01/12/2021    CHOLHDLRATIO 4.6 10/02/2018    CHOLHDLRATIO 3.7 06/26/2017       Lab Results   Component Value Date    LABA1C 5.2 01/29/2014       Lab Results   Component Value Date    DLMDFLIV04 660 01/12/2021       Lab Results   Component Value Date    FOLATE 13.5 01/12/2021       No results found for: IRON, TIBC, FERRITIN    Lab Results   Component Value Date    VITD25 76.8 01/12/2021             Current Outpatient Medications   Medication Sig Dispense Refill    buPROPion (WELLBUTRIN XL) 150 MG extended release tablet Take 1 tablet by mouth every morning 30 tablet 3    phentermine (ADIPEX-P) 37.5 MG tablet Take 1 tablet by mouth every morning (before breakfast) for 30 days. 30 tablet 0    metFORMIN (GLUCOPHAGE-XR) 500 MG extended release tablet Take 1 tablet by mouth daily (with breakfast) 90 tablet 3    ibuprofen (IBU) 800 MG tablet Take 1 tablet by mouth every 8 hours as needed for Pain 90 tablet 1    Cholecalciferol (VITAMIN D3) 5000 units TABS Take by mouth every other day (Patient not taking: Reported on 8/31/2021)       No current facility-administered medications for this visit.              Social History     Socioeconomic History    Marital status: Single     Spouse name: Not on file    Number of children: Not on file    Years of education: Not on file    Highest education level: Not on file   Occupational History    Not on file   Tobacco Use    Smoking status: Never Smoker    Smokeless tobacco: Never Used   Substance and Sexual Activity    Alcohol use: No    Drug use: No    Sexual activity: Yes     Partners: Male   Other Topics Concern    Not on file   Social History Narrative    Not on file     Social Determinants of Health     Financial Resource Strain:     Difficulty of Paying Living Expenses:    Food Insecurity:     Worried About Running Out of Food in the Last Year:     [address] in the Last Year:    Transportation Needs:  Lack of Transportation (Medical):  Lack of Transportation (Non-Medical):    Physical Activity:     Days of Exercise per Week:     Minutes of Exercise per Session:    Stress:     Feeling of Stress :    Social Connections:     Frequency of Communication with Friends and Family:     Frequency of Social Gatherings with Friends and Family:     Attends [employer] Services:     Active Member of Clubs or Organizations:     Attends Club or Organization Meetings:     Marital Status:    Intimate Partner Violence:     Fear of Current or Ex-Partner:     Emotionally Abused:     Physically Abused:     Sexually Abused:      Counseling given: Not Answered        Family History   Problem Relation Age of Onset    Hypertension Maternal Grandmother     Arthritis Maternal Grandmother     Lupus Maternal Grandmother     Osteoporosis Maternal Grandmother     High Blood Pressure Father     Breast Cancer Neg Hx     Cancer Neg Hx     Colon Cancer Neg Hx     Diabetes Neg Hx     Eclampsia Neg Hx     Ovarian Cancer Neg Hx      Labor Neg Hx     Spont Abortions Neg Hx     Stroke Neg Hx              -rest of complaints with corresponding details per ROS    The patient's past medical, surgical, social, and family history as well as her current medications and allergies were reviewed as documented intoday's encounter. Review of Systems   Constitutional: Negative for activity change, appetite change, diaphoresis, fatigue and unexpected weight change. HENT: Negative for congestion, dental problem and sinus pain. Eyes: Negative for photophobia and visual disturbance. Respiratory: Negative for apnea, cough, shortness of breath and wheezing. Cardiovascular: Negative for chest pain, palpitations and leg swelling. Gastrointestinal: Negative for abdominal distention, abdominal pain, blood in stool, constipation, diarrhea and vomiting. Endocrine: Negative for polyuria.    Genitourinary: Negative for Gait: Gait normal.   Psychiatric:         Mood and Affect: Mood normal.         Behavior: Behavior normal.         Thought Content: Thought content normal.         Judgment: Judgment normal.             ASSESSMENT AND PLAN      1. Class 3 severe obesity due to excess calories with serious comorbidity and body mass index (BMI) of 40.0 to 44.9 in adult [facility])  Start on Adipex, continue weight reduction discussed with monitoring her weight daily, start logging  Start daily walks. Close follow-up in 1 month  - phentermine (ADIPEX-P) 37.5 MG tablet; Take 1 tablet by mouth every morning (before breakfast) for 30 days. Dispense: 30 tablet; Refill: 0    2. Migraine without aura and without status migrainosus, not intractable  Discontinue Topamax due to side effects continue NSAIDs as needed    3. Mild episode of recurrent major depressive disorder [facility]  Start on extended release Wellbutrin. - buPROPion (WELLBUTRIN XL) 150 MG extended release tablet; Take 1 tablet by mouth every morning  Dispense: 30 tablet; Refill: 3    4. Encounter for weight management  Start Adipex continue lifestyle changes. - phentermine (ADIPEX-P) 37.5 MG tablet; Take 1 tablet by mouth every morning (before breakfast) for 30 days. Dispense: 30 tablet; Refill: 0      No orders of the defined types were placed in this encounter. Medications Discontinued During This Encounter   Medication Reason    topiramate (TOPAMAX) 25 MG tablet Therapy completed    buPROPion (WELLBUTRIN SR) 100 MG extended release tablet Therapy completed       Christos Schuler received counseling on the following healthy behaviors: nutrition, exercise and medication adherence  Reviewed prior labs and health maintenance  Continue current medications, diet and exercise. Discussed use, benefit, and side effects of prescribed medications. Barriers to medication compliance addressed.    Patient given educational materials - see patient instructions  Was a self-tracking handout given in paper form or via [email or web address]? Yes    Requested Prescriptions     Signed Prescriptions Disp Refills    buPROPion (WELLBUTRIN XL) 150 MG extended release tablet 30 tablet 3     Sig: Take 1 tablet by mouth every morning    phentermine (ADIPEX-P) 37.5 MG tablet 30 tablet 0     Sig: Take 1 tablet by mouth every morning (before breakfast) for 30 days. All patient questions answered. Patient voiced understanding. Quality Measures    Body mass index is 44.97 kg/m². Elevated. Weight control planned discussed daily exercise regimen and Healthy diet and regular exercise. BP: 126/80 Blood pressure is normal. Treatment plan consists of Weight Reduction, DASH Eating Plan, Dietary Sodium Restriction, Increased Physical Activity and No treatment change needed. Lab Results   Component Value Date    LDLCHOLESTEROL 102 01/12/2021    (goal LDL reduction with dx if diabetes is 50% LDL reduction)      PHQ Scores 7/7/2021 2/20/2020 9/24/2019 4/3/2019 2/26/2019 11/26/2018 10/30/2018   PHQ2 Score 2 5 0 0 2 2 6   PHQ9 Score 2 17 0 0 2 2 21     Interpretation of Total Score Depression Severity: 1-4 = Minimal depression, 5-9 = Mild depression, 10-14 = Moderate depression, 15-19 = Moderately severe depression, 20-27 = Severe depression    The patient'spast medical, surgical, social, and family history as well as her   current medications and allergies were reviewed as documented in today's encounter. Medications, labs, diagnostic studies, consultations andfollow-up as documented in this encounter. Return in about 4 weeks (around 9/28/2021). Patient wasseen with total face to face time of 30 minutes. More than 50% of this visit was counseling and education.        Future Appointments   Date Time Provider Sangita Olmos   9/30/2021  9:30 AM [facility]     This note was completed by using the assistance of a speech-recognition program. However, inadvertent computerized transcription errors

## 2021-08-31 NOTE — PROGRESS NOTES
Visit Information    Have you changed or started any medications since your last visit including any over-the-counter medicines, vitamins, or herbal medicines? no   Are you having any side effects from any of your medications? -  no  Have you stopped taking any of your medications? Is so, why? -  no    Have you seen any other physician or provider since your last visit? No  Have you had any other diagnostic tests since your last visit? No  Have you been seen in the emergency room and/or had an admission to a hospital since we last saw you? No  Have you had your routine dental cleaning in the past 6 months? no    Have you activated your "[employer] account? If not, what are your barriers?  Yes     Patient Care Team:  LUCY Hernandez CNP as PCP - General (Certified Nurse Practitioner)  LUCY Hernandez CNP as PCP - [facility]  Larry Bean DO as Consulting Physician (Obstetrics & Gynecology)  Adrianne Banks MD as Consulting Physician (Internal Medicine Cardiovascular Disease)  Larry Bean DO as Consulting Physician (Obstetrics & Gynecology)    Medical History Review  Past Medical, Family, and Social History reviewed and does contribute to the patient presenting condition    Health Maintenance   Topic Date Due    Hepatitis C screen  Never done    Varicella vaccine (2 of 2 - 13+ 2-dose series) 03/19/2020    Cervical cancer screen  09/24/2020    Flu vaccine (1) 09/01/2021    DTaP/Tdap/Td vaccine (4 - Td or Tdap) 05/04/2024    COVID-19 Vaccine  Completed    HIV screen  Completed    Hepatitis A vaccine  Aged Out    Hepatitis B vaccine  Aged Out    Hib vaccine  Aged Out    Meningococcal (ACWY) vaccine  Aged Out    Pneumococcal 0-64 years Vaccine  Aged Out

## 2021-09-01 LAB — VZV IGG SER QL IA: 2.96

## 2021-09-29 NOTE — PROGRESS NOTES
Visit Information    Have you changed or started any medications since your last visit including any over-the-counter medicines, vitamins, or herbal medicines? no   Have you stopped taking any of your medications? Is so, why? -  no  Are you having any side effects from any of your medications? - no    Have you seen any other physician or provider since your last visit?  no   Have you had any other diagnostic tests since your last visit?  no   Have you been seen in the emergency room and/or had an admission in a hospital since we last saw you?  no   Have you had your routine dental cleaning in the past 6 months?  no     Do you have an active MyChart account? If no, what is the barrier?   Yes    Patient Care Team:  Roxie Rooney MD as PCP - General (Family Medicine)  Roxie Rooney MD as PCP - [facility]  Qasim Hernandez DO as Consulting Physician (Obstetrics & Gynecology)  Regla Chavez MD as Consulting Physician (Internal Medicine Cardiovascular Disease)  Qasim Hernandez DO as Consulting Physician (Obstetrics & Gynecology)    Medical History Review  Past Medical, Family, and Social History reviewed and does contribute to the patient presenting condition    Health Maintenance   Topic Date Due    Flu vaccine (1) 09/01/2021    Pap smear  09/24/2022    DTaP/Tdap/Td vaccine (4 - Td or Tdap) 05/04/2024    COVID-19 Vaccine  Completed    Hepatitis C screen  Completed    HIV screen  Completed    Hepatitis A vaccine  Aged Out    Hepatitis B vaccine  Aged Out    Hib vaccine  Aged Out    Meningococcal (ACWY) vaccine  Aged Out    Pneumococcal 0-64 years Vaccine  Aged Out    Varicella vaccine  Discontinued

## 2021-09-30 ENCOUNTER — OFFICE VISIT (OUTPATIENT)
Dept: FAMILY MEDICINE CLINIC | Age: 26
End: 2021-09-30
Payer: COMMERCIAL

## 2021-09-30 VITALS
BODY MASS INDEX: 43.5 KG/M2 | DIASTOLIC BLOOD PRESSURE: 72 MMHG | HEART RATE: 71 BPM | TEMPERATURE: 97.8 F | WEIGHT: 230.4 LBS | HEIGHT: 61 IN | SYSTOLIC BLOOD PRESSURE: 118 MMHG | OXYGEN SATURATION: 98 %

## 2021-09-30 DIAGNOSIS — E66.01 CLASS 3 SEVERE OBESITY DUE TO EXCESS CALORIES WITH SERIOUS COMORBIDITY AND BODY MASS INDEX (BMI) OF 40.0 TO 44.9 IN ADULT (HCC): Primary | ICD-10-CM

## 2021-09-30 DIAGNOSIS — E28.2 PCOS (POLYCYSTIC OVARIAN SYNDROME): ICD-10-CM

## 2021-09-30 DIAGNOSIS — Z76.89 ENCOUNTER FOR WEIGHT MANAGEMENT: ICD-10-CM

## 2021-09-30 PROCEDURE — G8417 CALC BMI ABV UP PARAM F/U: HCPCS | Performed by: FAMILY MEDICINE

## 2021-09-30 PROCEDURE — 99213 OFFICE O/P EST LOW 20 MIN: CPT | Performed by: FAMILY MEDICINE

## 2021-09-30 PROCEDURE — 1036F TOBACCO NON-USER: CPT | Performed by: FAMILY MEDICINE

## 2021-09-30 PROCEDURE — G8427 DOCREV CUR MEDS BY ELIG CLIN: HCPCS | Performed by: FAMILY MEDICINE

## 2021-09-30 RX ORDER — PHENTERMINE HYDROCHLORIDE 37.5 MG/1
37.5 TABLET ORAL
Qty: 30 TABLET | Refills: 0 | Status: SHIPPED | OUTPATIENT
Start: 2021-09-30 | End: 2021-10-30

## 2021-09-30 RX ORDER — NORETHINDRONE ACETATE AND ETHINYL ESTRADIOL 1MG-20(24)
KIT ORAL
COMMUNITY
End: 2021-11-17 | Stop reason: SDUPTHER

## 2021-09-30 SDOH — ECONOMIC STABILITY: FOOD INSECURITY: WITHIN THE PAST 12 MONTHS, THE FOOD YOU BOUGHT JUST DIDN'T LAST AND YOU DIDN'T HAVE MONEY TO GET MORE.: NEVER TRUE

## 2021-09-30 SDOH — ECONOMIC STABILITY: FOOD INSECURITY: WITHIN THE PAST 12 MONTHS, YOU WORRIED THAT YOUR FOOD WOULD RUN OUT BEFORE YOU GOT MONEY TO BUY MORE.: NEVER TRUE

## 2021-09-30 ASSESSMENT — ENCOUNTER SYMPTOMS
WHEEZING: 0
ABDOMINAL PAIN: 0
BLOOD IN STOOL: 0
BACK PAIN: 0
PHOTOPHOBIA: 0
ABDOMINAL DISTENTION: 0
NAUSEA: 0
SHORTNESS OF BREATH: 0
CONSTIPATION: 0
COLOR CHANGE: 0
CHEST TIGHTNESS: 0
COUGH: 0

## 2021-09-30 ASSESSMENT — PATIENT HEALTH QUESTIONNAIRE - PHQ9
SUM OF ALL RESPONSES TO PHQ QUESTIONS 1-9: 2
2. FEELING DOWN, DEPRESSED OR HOPELESS: 2
SUM OF ALL RESPONSES TO PHQ9 QUESTIONS 1 & 2: 2
1. LITTLE INTEREST OR PLEASURE IN DOING THINGS: 0

## 2021-09-30 ASSESSMENT — SOCIAL DETERMINANTS OF HEALTH (SDOH): HOW HARD IS IT FOR YOU TO PAY FOR THE VERY BASICS LIKE FOOD, HOUSING, MEDICAL CARE, AND HEATING?: NOT HARD AT ALL

## 2021-09-30 NOTE — PROGRESS NOTES
Chief Complaint   Patient presents with    Weight Management     ADIPEX #2    Immunizations     YES HAD COVID-19 VACCINE/ WILL GET FLU VACCINE AT WORK         Angela Esposito  here today for follow up on chronic medical problems, go over labs and/or diagnostic studies, and medication refills. Weight Management (ADIPEX #2) and Immunizations (YES HAD COVID-19 VACCINE/ WILL GET FLU VACCINE AT WORK)      HPI: Patient is here for Adipex follow-up. Patient completed 1 month lost about 8 pounds. Patient reports she has started walking daily and Hello on 2nd reading quickly for test with pain      Wants to lose weight. Angela Esposito was started on Adipex. Patient is here for refill of Adipex #2    In addition to the medication, patient also using diet and exercise as follows:  -diet:  -exercise:    Medication side effects:omnia and irritability. Patient denies None, nausea, vomiting, abdominal pain, palpitations, insomnia and irritability. Urine drug test done    LMP:    Contraceptive method:    Patient informed that when prescribed a controlled substance for weight loss, the provider is required by law to see the patient for an appointment every thirty days. This is neccessary to record the weight and blood pressure and to assess patient's efforts to lose weight, and to ensure there are no contraindications or adverse effects. Patient advised contraception during the time of taking this medication, advised no alcohol use during this time      blood pressure is normal  BP Readings from Last 3 Encounters:   09/30/21 118/72   08/31/21 126/80   11/16/20 116/74        Pulse is Normal.     Pulse Readings from Last 3 Encounters:   09/30/21 71   08/31/21 68   11/16/20 57       Weight has been decreasing.   Patient intentionally lost8 in 1 month  Wt Readings from Last 3 Encounters:   09/30/21 230 lb 6.4 oz (104.5 kg)   08/31/21 238 lb (108 kg)   11/16/20 229 lb (103.9 kg)         Exercise Tracking - Detailed 9/30/2021   Walking Duration 30   Walking Intensity Low   Walking Times/Week 5   Biking Duration -   Running Duration -   Swimming Duration -   Cardiovascular Equipment Duration -   Exercise Classes / Videos Duration 30   Exercise Classes / Videos Intensity Low   Exercise Classes / Videos Times/Week 5   Other Exercise Comment -   Other Duration -   Other Intensity -   Other Times/Week -   Total Exercise Minutes/Week 300                 /72   Pulse 71   Temp 97.8 °F (36.6 °C)   Ht 5' 1\" (1.549 m)   Wt 230 lb 6.4 oz (104.5 kg)   SpO2 98%   BMI 43.53 kg/m²    Body mass index is 43.53 kg/m². Wt Readings from Last 3 Encounters:   09/30/21 230 lb 6.4 oz (104.5 kg)   08/31/21 238 lb (108 kg)   11/16/20 229 lb (103.9 kg)        []Negative depression screening. PHQ Scores 9/30/2021 7/7/2021 2/20/2020 9/24/2019 4/3/2019 2/26/2019 11/26/2018   PHQ2 Score 2 2 5 0 0 2 2   PHQ9 Score 2 2 17 0 0 2 2      [x]1-4 = Minimal depression   []5-9 = Milddepression   []10-14 = Moderate depression   []15-19 = Moderately severe depression   []20-27 = Severe depression    Discussed testing with the patient and all questions fully answered. Hospital Outpatient Visit on 08/31/2021   Component Date Value Ref Range Status    Hemoglobin A1C 08/31/2021 5.0  4.0 - 6.0 % Final    Estimated Avg Glucose 08/31/2021 97  mg/dL Final    Comment: The ADA and AACC recommend providing the estimated average glucose result to permit better   patient understanding of their HBA1c result.  VARICELLA ZOSTER AB IGG 08/31/2021 2.96  >1.09 Final    Comment:    Interpretation:  IMMUNE     Reference Range:  <0.91         Not Immune  0.91-1.09     Equivocal  >1.09         Immune         Hepatitis C Ab 08/31/2021 NONREACTIVE  NONREACTIVE Final    Comment:       The hepatitis C procedure used in our laboratory is a Chemiluminescent test specific for   three recombinant HCV antigens.   A negative anti-HCV result indicates that the antibodies to hepatitis C virus are not present at this time. Individuals with reactive anti-HCV should be considered infected and infectious until proven   otherwise. Confirmation of all equivocal or reactive results is recommended by ordering   HCV RNA by PCR.            Most recent labs reviewed:     Lab Results   Component Value Date    WBC 7.8 01/12/2021    HGB 12.7 01/12/2021    HCT 38.4 01/12/2021    MCV 87.1 01/12/2021     01/12/2021       @BRIEFLAB(NA,K,CL,CO2,BUN,CREATININE,GLUCOSE,CALCIUM)@     Lab Results   Component Value Date    ALT 27 10/02/2018    AST 22 10/02/2018    ALKPHOS 64 10/02/2018    BILITOT 0.43 10/02/2018       Lab Results   Component Value Date    TSH 2.14 01/12/2021       Lab Results   Component Value Date    CHOL 175 01/12/2021    CHOL 185 10/02/2018    CHOL 164 06/26/2017     Lab Results   Component Value Date    TRIG 111 01/12/2021    TRIG 69 10/02/2018    TRIG 59 06/26/2017     Lab Results   Component Value Date    HDL 51 01/12/2021    HDL 40 (L) 10/02/2018    HDL 44 06/26/2017     Lab Results   Component Value Date    LDLCHOLESTEROL 102 01/12/2021    LDLCHOLESTEROL 131 (H) 10/02/2018    LDLCHOLESTEROL 108 06/26/2017     Lab Results   Component Value Date    VLDL NOT REPORTED 01/12/2021    VLDL NOT REPORTED 10/02/2018    VLDL NOT REPORTED 06/26/2017     Lab Results   Component Value Date    CHOLHDLRATIO 3.4 01/12/2021    CHOLHDLRATIO 4.6 10/02/2018    CHOLHDLRATIO 3.7 06/26/2017       Lab Results   Component Value Date    LABA1C 5.0 08/31/2021       Lab Results   Component Value Date    ZJLBJCNR04 660 01/12/2021       Lab Results   Component Value Date    FOLATE 13.5 01/12/2021       No results found for: IRON, TIBC, FERRITIN    Lab Results   Component Value Date    VITD25 76.8 01/12/2021             Current Outpatient Medications   Medication Sig Dispense Refill    Norethin Ace-Eth Estrad-FE (BLISOVI 24 FE) 1-20 MG-MCG(24) TABS Take by mouth      phentermine (ADIPEX-P) 37.5 MG tablet Take 1 tablet by mouth every morning (before breakfast) for 30 days. 30 tablet 0    buPROPion (WELLBUTRIN XL) 150 MG extended release tablet Take 1 tablet by mouth every morning 30 tablet 3    phentermine (ADIPEX-P) 37.5 MG tablet Take 1 tablet by mouth every morning (before breakfast) for 30 days. 30 tablet 0    metFORMIN (GLUCOPHAGE-XR) 500 MG extended release tablet Take 1 tablet by mouth daily (with breakfast) 90 tablet 3    ibuprofen (IBU) 800 MG tablet Take 1 tablet by mouth every 8 hours as needed for Pain 90 tablet 1    Cholecalciferol (VITAMIN D3) 5000 units TABS Take by mouth every other day        No current facility-administered medications for this visit. Social History     Socioeconomic History    Marital status: Single     Spouse name: Not on file    Number of children: Not on file    Years of education: Not on file    Highest education level: Not on file   Occupational History    Not on file   Tobacco Use    Smoking status: Never Smoker    Smokeless tobacco: Never Used   Substance and Sexual Activity    Alcohol use: No    Drug use: No    Sexual activity: Yes     Partners: Male   Other Topics Concern    Not on file   Social History Narrative    Not on file     Social Determinants of Health     Financial Resource Strain: Low Risk     Difficulty of Paying Living Expenses: Not hard at all   Food Insecurity: No Food Insecurity    Worried About Running Out of Food in the Last Year: Never true    [address] in the Last Year: Never true   Transportation Needs:     Lack of Transportation (Medical):      Lack of Transportation (Non-Medical):    Physical Activity:     Days of Exercise per Week:     Minutes of Exercise per Session:    Stress:     Feeling of Stress :    Social Connections:     Frequency of Communication with Friends and Family:     Frequency of Social Gatherings with Friends and Family:     Attends [employer] Services:     Active Member of Clubs or Organizations:     Attends Club or Organization Meetings:     Marital Status:    Intimate Partner Violence:     Fear of Current or Ex-Partner:     Emotionally Abused:     Physically Abused:     Sexually Abused:      Counseling given: Not Answered        Family History   Problem Relation Age of Onset    Hypertension Maternal Grandmother     Arthritis Maternal Grandmother     Lupus Maternal Grandmother     Osteoporosis Maternal Grandmother     High Blood Pressure Father     Breast Cancer Neg Hx     Cancer Neg Hx     Colon Cancer Neg Hx     Diabetes Neg Hx     Eclampsia Neg Hx     Ovarian Cancer Neg Hx      Labor Neg Hx     Spont Abortions Neg Hx     Stroke Neg Hx              -rest of complaints with corresponding details per ROS    The patient's past medical, surgical, social, and family history as well as her current medications and allergies were reviewed as documented intoday's encounter. Review of Systems   Constitutional: Positive for unexpected weight change. Negative for activity change, diaphoresis and fatigue. Eyes: Negative for photophobia and visual disturbance. Respiratory: Negative for cough, chest tightness, shortness of breath and wheezing. Cardiovascular: Negative for chest pain, palpitations and leg swelling. Gastrointestinal: Negative for abdominal distention, abdominal pain, blood in stool, constipation and nausea. Endocrine: Negative for polyuria. Genitourinary: Negative for difficulty urinating, frequency, urgency and vaginal pain. Musculoskeletal: Negative for arthralgias, back pain, myalgias, neck pain and neck stiffness. Skin: Negative for color change and rash. Neurological: Negative for dizziness, speech difficulty, weakness and numbness. Psychiatric/Behavioral: Negative for agitation, decreased concentration, dysphoric mood, self-injury and sleep disturbance. The patient is not nervous/anxious.             Physical Exam  Vitals and diet and exercise. Discussed use, benefit, and side effects of prescribed medications. Barriers to medication compliance addressed. Patient given educational materials - see patient instructions  Was a self-tracking handout given in paper form or via [employer]? Yes    Requested Prescriptions     Signed Prescriptions Disp Refills    phentermine (ADIPEX-P) 37.5 MG tablet 30 tablet 0     Sig: Take 1 tablet by mouth every morning (before breakfast) for 30 days. All patient questions answered. Patient voiced understanding. Quality Measures    Body mass index is 43.53 kg/m². Elevated. Weight control planned discussed medically supervised diet with primary care physician and Healthy diet and regular exercise. BP: 118/72 Blood pressure is normal. Treatment plan consists of Weight Reduction, DASH Eating Plan, Dietary Sodium Restriction, Increased Physical Activity and No treatment change needed. Lab Results   Component Value Date    LDLCHOLESTEROL 102 01/12/2021    (goal LDL reduction with dx if diabetes is 50% LDL reduction)      PHQ Scores 9/30/2021 7/7/2021 2/20/2020 9/24/2019 4/3/2019 2/26/2019 11/26/2018   PHQ2 Score 2 2 5 0 0 2 2   PHQ9 Score 2 2 17 0 0 2 2     Interpretation of Total Score Depression Severity: 1-4 = Minimal depression, 5-9 = Mild depression, 10-14 = Moderate depression, 15-19 = Moderately severe depression, 20-27 = Severe depression    The patient'spast medical, surgical, social, and family history as well as her   current medications and allergies were reviewed as documented in today's encounter. Medications, labs, diagnostic studies, consultations andfollow-up as documented in this encounter. Return in about 4 weeks (around 10/28/2021) for ADIPEX3. Patient wasseen with total face to face time of 20 minutes. More than 50% of this visit was counseling and education.        Future Appointments   Date Time Provider Sangita Olmos   11/1/2021  9:30 AM Domenica Valverde MD fp [facility]     This note was completed by using the assistance of a speech-recognition program. However, inadvertent computerized transcription errors may be present. Althoughevery effort was made to ensure accuracy, no guarantees can be provided that every mistake has been identified and corrected by editing.   Electronically signed by Nat Flower MD on 9/30/2021  10:20 AM

## 2021-11-01 ENCOUNTER — OFFICE VISIT (OUTPATIENT)
Dept: FAMILY MEDICINE CLINIC | Age: 26
End: 2021-11-01
Payer: COMMERCIAL

## 2021-11-01 VITALS
WEIGHT: 231 LBS | TEMPERATURE: 97.8 F | HEIGHT: 61 IN | OXYGEN SATURATION: 98 % | BODY MASS INDEX: 43.61 KG/M2 | DIASTOLIC BLOOD PRESSURE: 86 MMHG | HEART RATE: 71 BPM | SYSTOLIC BLOOD PRESSURE: 110 MMHG

## 2021-11-01 DIAGNOSIS — E66.01 CLASS 3 SEVERE OBESITY DUE TO EXCESS CALORIES WITH SERIOUS COMORBIDITY AND BODY MASS INDEX (BMI) OF 40.0 TO 44.9 IN ADULT (HCC): Primary | ICD-10-CM

## 2021-11-01 DIAGNOSIS — E28.2 PCOS (POLYCYSTIC OVARIAN SYNDROME): ICD-10-CM

## 2021-11-01 DIAGNOSIS — Z76.89 ENCOUNTER FOR WEIGHT MANAGEMENT: ICD-10-CM

## 2021-11-01 PROCEDURE — G8417 CALC BMI ABV UP PARAM F/U: HCPCS | Performed by: FAMILY MEDICINE

## 2021-11-01 PROCEDURE — 99214 OFFICE O/P EST MOD 30 MIN: CPT | Performed by: FAMILY MEDICINE

## 2021-11-01 PROCEDURE — G8427 DOCREV CUR MEDS BY ELIG CLIN: HCPCS | Performed by: FAMILY MEDICINE

## 2021-11-01 PROCEDURE — G8484 FLU IMMUNIZE NO ADMIN: HCPCS | Performed by: FAMILY MEDICINE

## 2021-11-01 PROCEDURE — 1036F TOBACCO NON-USER: CPT | Performed by: FAMILY MEDICINE

## 2021-11-01 RX ORDER — PHENTERMINE HYDROCHLORIDE 37.5 MG/1
37.5 TABLET ORAL
Qty: 30 TABLET | Refills: 0 | Status: SHIPPED | OUTPATIENT
Start: 2021-11-01 | End: 2021-12-01

## 2021-11-01 ASSESSMENT — ENCOUNTER SYMPTOMS
SHORTNESS OF BREATH: 0
PHOTOPHOBIA: 0
CONSTIPATION: 0
COUGH: 0
DIARRHEA: 0
COLOR CHANGE: 0
BACK PAIN: 0
ABDOMINAL PAIN: 0
CHEST TIGHTNESS: 0
BLOOD IN STOOL: 0
ABDOMINAL DISTENTION: 0
WHEEZING: 0

## 2021-11-01 NOTE — PROGRESS NOTES
Chief Complaint   Patient presents with    Weight Management     ADIPEX #3    Numbness     hands - at times its hard to close them    Joint Pain         Sarah Adamson  here today for follow up on chronic medical problems, go over labs and/or diagnostic studies, and medication refills. Weight Management (ADIPEX #3), Numbness (hands - at times its hard to close them), and Joint Pain      HPI: Patient is here for follow-up on weight management completed 2 months of Adipex. Patient has only 9 pounds so far. Patient lost only 2 pounds in last month. Patient reports she stopped exercise and was doing only 30 minutes 3 days a week. Patient reports she was stressed regarding her son who was abused by his father. She was dealing with court . Patient still watches her diet and cut down on pop. Wants to lose weight. Sarah Adamson was started on Adipex. Patient is here for refill of Adipex #3    In addition to the medication, patient also using diet and exercise as follows:  -diet:  -exercise:    Medication side effects: tremor and , numbness and cold. .     Patient denies nausea, vomiting, abdominal pain, involuntary weight loss and palpitations. Urine drug test done no    LMP:    Contraceptive method:    Patient informed that when prescribed a controlled substance for weight loss, the provider is required by law to see the patient for an appointment every thirty days. This is neccessary to record the weight and blood pressure and to assess patient's efforts to lose weight, and to ensure there are no contraindications or adverse effects.    Patient advised contraception during the time of taking this medication, advised no alcohol use during this time      blood pressure is Normal.  BP Readings from Last 3 Encounters:   11/01/21 110/86   09/30/21 118/72   08/31/21 126/80        Pulse is Normal.     Pulse Readings from Last 3 Encounters:   11/01/21 71   09/30/21 71   08/31/21 68       Weight has been not much dec. Patient intentionally lost 2 in 1 month  Wt Readings from Last 3 Encounters:   11/01/21 231 lb (104.8 kg)   09/30/21 230 lb 6.4 oz (104.5 kg)   08/31/21 238 lb (108 kg)         Exercise Tracking - Detailed 11/1/2021   Walking Duration -   Walking Intensity -   Walking Times/Week -   Biking Duration -   Running Duration -   Swimming Duration -   Cardiovascular Equipment Duration -   Exercise Classes / Videos Duration 30   Exercise Classes / Videos Intensity -   Exercise Classes / Videos Times/Week 3   Other Exercise Comment -   Other Duration -   Other Intensity -   Other Times/Week -   Total Exercise Minutes/Week 90                 /86   Pulse 71   Temp 97.8 °F (36.6 °C)   Ht 5' 1\" (1.549 m)   Wt 231 lb (104.8 kg)   SpO2 98%   BMI 43.65 kg/m²    Body mass index is 43.65 kg/m². Wt Readings from Last 3 Encounters:   11/01/21 231 lb (104.8 kg)   09/30/21 230 lb 6.4 oz (104.5 kg)   08/31/21 238 lb (108 kg)        []Negative depression screening. PHQ Scores 9/30/2021 7/7/2021 2/20/2020 9/24/2019 4/3/2019 2/26/2019 11/26/2018   PHQ2 Score 2 2 5 0 0 2 2   PHQ9 Score 2 2 17 0 0 2 2      [x]1-4 = Minimal depression   []5-9 = Milddepression   []10-14 = Moderate depression   []15-19 = Moderately severe depression   []20-27 = Severe depression    Discussed testing with the patient and all questions fully answered. Hospital Outpatient Visit on 08/31/2021   Component Date Value Ref Range Status    Hemoglobin A1C 08/31/2021 5.0  4.0 - 6.0 % Final    Estimated Avg Glucose 08/31/2021 97  mg/dL Final    Comment: The ADA and AACC recommend providing the estimated average glucose result to permit better   patient understanding of their HBA1c result.       VARICELLA ZOSTER AB IGG 08/31/2021 2.96  >1.09 Final    Comment:    Interpretation:  IMMUNE     Reference Range:  <0.91         Not Immune  0.91-1.09     Equivocal  >1.09         Immune         Hepatitis C Ab 08/31/2021 NONREACTIVE  NONREACTIVE Final Comment:       The hepatitis C procedure used in our laboratory is a Chemiluminescent test specific for   three recombinant HCV antigens. A negative anti-HCV result indicates that the antibodies to   hepatitis C virus are not present at this time. Individuals with reactive anti-HCV should be considered infected and infectious until proven   otherwise. Confirmation of all equivocal or reactive results is recommended by ordering   HCV RNA by PCR.            Most recent labs reviewed:     Lab Results   Component Value Date    WBC 7.8 01/12/2021    HGB 12.7 01/12/2021    HCT 38.4 01/12/2021    MCV 87.1 01/12/2021     01/12/2021       @BRIEFLAB(NA,K,CL,CO2,BUN,CREATININE,GLUCOSE,CALCIUM)@     Lab Results   Component Value Date    ALT 27 10/02/2018    AST 22 10/02/2018    ALKPHOS 64 10/02/2018    BILITOT 0.43 10/02/2018       Lab Results   Component Value Date    TSH 2.14 01/12/2021       Lab Results   Component Value Date    CHOL 175 01/12/2021    CHOL 185 10/02/2018    CHOL 164 06/26/2017     Lab Results   Component Value Date    TRIG 111 01/12/2021    TRIG 69 10/02/2018    TRIG 59 06/26/2017     Lab Results   Component Value Date    HDL 51 01/12/2021    HDL 40 (L) 10/02/2018    HDL 44 06/26/2017     Lab Results   Component Value Date    LDLCHOLESTEROL 102 01/12/2021    LDLCHOLESTEROL 131 (H) 10/02/2018    LDLCHOLESTEROL 108 06/26/2017     Lab Results   Component Value Date    VLDL NOT REPORTED 01/12/2021    VLDL NOT REPORTED 10/02/2018    VLDL NOT REPORTED 06/26/2017     Lab Results   Component Value Date    CHOLHDLRATIO 3.4 01/12/2021    CHOLHDLRATIO 4.6 10/02/2018    CHOLHDLRATIO 3.7 06/26/2017       Lab Results   Component Value Date    LABA1C 5.0 08/31/2021       Lab Results   Component Value Date    NHJROJQQ34 660 01/12/2021       Lab Results   Component Value Date    FOLATE 13.5 01/12/2021       No results found for: IRON, TIBC, FERRITIN    Lab Results   Component Value Date    VITD25 76.8 01/12/2021 Current Outpatient Medications   Medication Sig Dispense Refill    phentermine (ADIPEX-P) 37.5 MG tablet Take 1 tablet by mouth every morning (before breakfast) for 30 days. 30 tablet 0    Norethin Ace-Eth Estrad-FE (BLISOVI 24 FE) 1-20 MG-MCG(24) TABS Take by mouth      buPROPion (WELLBUTRIN XL) 150 MG extended release tablet Take 1 tablet by mouth every morning 30 tablet 3    metFORMIN (GLUCOPHAGE-XR) 500 MG extended release tablet Take 1 tablet by mouth daily (with breakfast) 90 tablet 3    ibuprofen (IBU) 800 MG tablet Take 1 tablet by mouth every 8 hours as needed for Pain 90 tablet 1    Cholecalciferol (VITAMIN D3) 5000 units TABS Take by mouth every other day  (Patient not taking: Reported on 11/1/2021)       No current facility-administered medications for this visit. Social History     Socioeconomic History    Marital status: Single     Spouse name: Not on file    Number of children: Not on file    Years of education: Not on file    Highest education level: Not on file   Occupational History    Not on file   Tobacco Use    Smoking status: Never Smoker    Smokeless tobacco: Never Used   Substance and Sexual Activity    Alcohol use: No    Drug use: No    Sexual activity: Yes     Partners: Male   Other Topics Concern    Not on file   Social History Narrative    Not on file     Social Determinants of Health     Financial Resource Strain: Low Risk     Difficulty of Paying Living Expenses: Not hard at all   Food Insecurity: No Food Insecurity    Worried About Running Out of Food in the Last Year: Never true    [address] in the Last Year: Never true   Transportation Needs:     Lack of Transportation (Medical):      Lack of Transportation (Non-Medical):    Physical Activity:     Days of Exercise per Week:     Minutes of Exercise per Session:    Stress:     Feeling of Stress :    Social Connections:     Frequency of Communication with Friends and Family:     Frequency of Social Gatherings with Friends and Family:     Attends Church Services:     Active Member of Clubs or Organizations:     Attends Club or Organization Meetings:     Marital Status:    Intimate Partner Violence:     Fear of Current or Ex-Partner:     Emotionally Abused:     Physically Abused:     Sexually Abused:      Counseling given: Not Answered        Family History   Problem Relation Age of Onset    Hypertension Maternal Grandmother     Arthritis Maternal Grandmother     Lupus Maternal Grandmother     Osteoporosis Maternal Grandmother     High Blood Pressure Father     Breast Cancer Neg Hx     Cancer Neg Hx     Colon Cancer Neg Hx     Diabetes Neg Hx     Eclampsia Neg Hx     Ovarian Cancer Neg Hx      Labor Neg Hx     Spont Abortions Neg Hx     Stroke Neg Hx              -rest of complaints with corresponding details per ROS    The patient's past medical, surgical, social, and family history as well as her current medications and allergies were reviewed as documented intoday's encounter. Review of Systems   Constitutional: Negative for activity change, appetite change, diaphoresis, fatigue, fever and unexpected weight change. HENT: Negative for congestion. Eyes: Negative for photophobia and visual disturbance. Respiratory: Negative for cough, chest tightness, shortness of breath and wheezing. Cardiovascular: Negative for chest pain, palpitations and leg swelling. Gastrointestinal: Negative for abdominal distention, abdominal pain, blood in stool, constipation and diarrhea. Endocrine: Negative for polyuria. Genitourinary: Negative for difficulty urinating, flank pain, frequency, urgency and vaginal pain. Musculoskeletal: Negative for arthralgias, back pain and joint swelling. Skin: Negative for color change and wound. Neurological: Positive for numbness.  Negative for dizziness, syncope, speech difficulty, weakness, light-headedness and headaches. Psychiatric/Behavioral: Negative for agitation, decreased concentration, hallucinations and sleep disturbance. The patient is nervous/anxious. Physical Exam  Vitals and nursing note reviewed. Constitutional:       Appearance: Normal appearance. HENT:      Head: Normocephalic. Nose: Nose normal.      Mouth/Throat:      Mouth: Mucous membranes are moist.   Eyes:      Extraocular Movements: Extraocular movements intact. Pupils: Pupils are equal, round, and reactive to light. Cardiovascular:      Rate and Rhythm: Normal rate and regular rhythm. Heart sounds: Normal heart sounds. Pulmonary:      Effort: Pulmonary effort is normal.      Breath sounds: Normal breath sounds. No wheezing. Abdominal:      General: Bowel sounds are normal.      Palpations: Abdomen is soft. Musculoskeletal:         General: No swelling, tenderness, deformity or signs of injury. Normal range of motion. Cervical back: Normal range of motion. Neurological:      General: No focal deficit present. Mental Status: She is alert and oriented to person, place, and time. Psychiatric:         Attention and Perception: Attention and perception normal.         Mood and Affect: Mood is anxious. Mood is not depressed. Speech: She is communicative. Speech is delayed. Speech is not rapid and pressured. Cognition and Memory: Cognition normal. Cognition is not impaired. Memory is not impaired. ASSESSMENT AND PLAN      1. Class 3 severe obesity due to excess calories with serious comorbidity and body mass index (BMI) of 40.0 to 44.9 in adult [facility])  Discussed with patient you need to increase physical activity. Watch her calories. Download my fitness pal nevaeh. Monitoring her weight daily. - phentermine (ADIPEX-P) 37.5 MG tablet; Take 1 tablet by mouth every morning (before breakfast) for 30 days. Dispense: 30 tablet; Refill: 0    2.  PCOS (polycystic ovarian syndrome)    - phentermine (ADIPEX-P) 37.5 MG tablet; Take 1 tablet by mouth every morning (before breakfast) for 30 days. Dispense: 30 tablet; Refill: 0    3. Encounter for weight management    - phentermine (ADIPEX-P) 37.5 MG tablet; Take 1 tablet by mouth every morning (before breakfast) for 30 days. Dispense: 30 tablet; Refill: 0      No orders of the defined types were placed in this encounter. There are no discontinued medications. Courtney Sheppard received counseling on the following healthy behaviors: nutrition, exercise and medication adherence  Reviewed prior labs and health maintenance  Continue current medications, diet and exercise. Discussed use, benefit, and side effects of prescribed medications. Barriers to medication compliance addressed. Patient given educational materials - see patient instructions  Was a self-tracking handout given in paper form or via [employer]? Yes    Requested Prescriptions     Signed Prescriptions Disp Refills    phentermine (ADIPEX-P) 37.5 MG tablet 30 tablet 0     Sig: Take 1 tablet by mouth every morning (before breakfast) for 30 days. All patient questions answered. Patient voiced understanding. Quality Measures    Body mass index is 43.65 kg/m². Elevated. Weight control planned discussed medically supervised diet with primary care physician, daily exercise regimen and Healthy diet and regular exercise. BP: 110/86 Blood pressure is normal. Treatment plan consists of Weight Reduction, DASH Eating Plan, Dietary Sodium Restriction, Increased Physical Activity and No treatment change needed.     Lab Results   Component Value Date    LDLCHOLESTEROL 102 01/12/2021    (goal LDL reduction with dx if diabetes is 50% LDL reduction)      PHQ Scores 9/30/2021 7/7/2021 2/20/2020 9/24/2019 4/3/2019 2/26/2019 11/26/2018   PHQ2 Score 2 2 5 0 0 2 2   PHQ9 Score 2 2 17 0 0 2 2     Interpretation of Total Score Depression Severity: 1-4 = Minimal depression, 5-9 = Mild

## 2021-11-01 NOTE — PROGRESS NOTES
Visit Information    Have you changed or started any medications since your last visit including any over-the-counter medicines, vitamins, or herbal medicines? no   Are you having any side effects from any of your medications? -  no  Have you stopped taking any of your medications? Is so, why? -  no    Have you seen any other physician or provider since your last visit? No  Have you had any other diagnostic tests since your last visit? No  Have you been seen in the emergency room and/or had an admission to a hospital since we last saw you? No  Have you had your routine dental cleaning in the past 6 months? no    Have you activated your [employer] account? If not, what are your barriers?  Yes     Patient Care Team:  Jamie Barnes MD as PCP - General (Family Medicine)  Jamie Barnes MD as PCP - [facility] Provider  Asia Schwartz DO as Consulting Physician (Obstetrics & Gynecology)  Andrae Osuna MD as Consulting Physician (Internal Medicine Cardiovascular Disease)  Asia Schwartz DO as Consulting Physician (Obstetrics & Gynecology)    Medical History Review  Past Medical, Family, and Social History reviewed and does contribute to the patient presenting condition    Health Maintenance   Topic Date Due    Flu vaccine (1) 09/01/2021    Pap smear  09/24/2022    DTaP/Tdap/Td vaccine (4 - Td or Tdap) 05/04/2024    COVID-19 Vaccine  Completed    Hepatitis C screen  Completed    HIV screen  Completed    Hepatitis A vaccine  Aged Out    Hepatitis B vaccine  Aged Out    Hib vaccine  Aged Out    Meningococcal (ACWY) vaccine  Aged Out    Pneumococcal 0-64 years Vaccine  Aged Out    Varicella vaccine  Discontinued

## 2021-11-17 RX ORDER — NORETHINDRONE ACETATE AND ETHINYL ESTRADIOL 1MG-20(24)
1 KIT ORAL DAILY
Qty: 28 TABLET | Refills: 0 | Status: SHIPPED | OUTPATIENT
Start: 2021-11-17 | End: 2021-12-07 | Stop reason: SDUPTHER

## 2021-12-07 ENCOUNTER — OFFICE VISIT (OUTPATIENT)
Dept: OBGYN CLINIC | Age: 26
End: 2021-12-07
Payer: COMMERCIAL

## 2021-12-07 ENCOUNTER — HOSPITAL ENCOUNTER (OUTPATIENT)
Age: 26
Setting detail: SPECIMEN
Discharge: HOME | End: 2021-12-07

## 2021-12-07 VITALS
WEIGHT: 230 LBS | BODY MASS INDEX: 43.43 KG/M2 | DIASTOLIC BLOOD PRESSURE: 74 MMHG | HEIGHT: 61 IN | SYSTOLIC BLOOD PRESSURE: 116 MMHG

## 2021-12-07 DIAGNOSIS — Z30.09 FAMILY PLANNING: Primary | ICD-10-CM

## 2021-12-07 DIAGNOSIS — Z01.419 WOMEN'S ANNUAL ROUTINE GYNECOLOGICAL EXAMINATION: Primary | ICD-10-CM

## 2021-12-07 PROCEDURE — G8484 FLU IMMUNIZE NO ADMIN: HCPCS | Performed by: NURSE PRACTITIONER

## 2021-12-07 PROCEDURE — 99395 PREV VISIT EST AGE 18-39: CPT | Performed by: NURSE PRACTITIONER

## 2021-12-07 RX ORDER — NORETHINDRONE ACETATE AND ETHINYL ESTRADIOL 1MG-20(24)
1 KIT ORAL DAILY
Qty: 28 TABLET | Refills: 12 | Status: SHIPPED | OUTPATIENT
Start: 2021-12-07 | End: 2022-03-04 | Stop reason: ALTCHOICE

## 2021-12-07 ASSESSMENT — PATIENT HEALTH QUESTIONNAIRE - PHQ9
2. FEELING DOWN, DEPRESSED OR HOPELESS: 0
SUM OF ALL RESPONSES TO PHQ QUESTIONS 1-9: 0
1. LITTLE INTEREST OR PLEASURE IN DOING THINGS: 0
SUM OF ALL RESPONSES TO PHQ QUESTIONS 1-9: 0
SUM OF ALL RESPONSES TO PHQ QUESTIONS 1-9: 0
SUM OF ALL RESPONSES TO PHQ9 QUESTIONS 1 & 2: 0

## 2021-12-07 NOTE — PATIENT INSTRUCTIONS
Patient Education        Combination Birth Control Pills: Care Instructions  Overview     Combination birth control pills are used to prevent pregnancy. They give you a regular dose of the hormones estrogen and progestin. You take a pill every day to prevent pregnancy. Birth control pills come in packs. The most common type has 3 weeks of hormone pills. Some packs have sugar pills (they do not contain any hormones) for the fourth week. During that fourth no-hormone week, you have your period. After the fourth week (28 days), you start a new pack. Some birth control pills are packaged in different ways. For example, some have hormone pills for the fourth week instead of sugar pills. This is called continuous use. Taking hormones for the entire month causes you to not have periods or to have fewer periods. Others are packaged so that you have a period every 3 months. Your doctor will tell you what type of pills you have. Follow-up care is a key part of your treatment and safety. Be sure to make and go to all appointments, and call your doctor if you are having problems. It's also a good idea to know your test results and keep a list of the medicines you take. How can you care for yourself at home? How do you take the pill? · Follow your doctor's instructions about when to start taking your pills. Use backup birth control, such as a condom, or don't have intercourse for 7 days after you start your pills. · Take your pills every day, at about the same time of day. To help yourself do this, try to take them when you do something else every day, such as brushing your teeth. · You can use the pill continuously and skip your period. When you get to the week that you take hormone-free pills, skip those pills and instead start right away on your next pill pack. Continue to take your pill every day. Talk to your doctor if you have any questions. What if you forget to take a pill?   Always read the label for specific instructions, or call your doctor. Here are some basic guidelines:  · If you miss 1 hormone pill, take it as soon as you remember. Ask your doctor if you may need to use a backup birth control method, such as a condom, or not have intercourse. · If you miss 2 or more hormone pills, take one as soon as you remember you forgot them. Then read the pill label or call your doctor about instructions on how to take your missed pills. Use a backup method of birth control or don't have intercourse for 7 days. Pregnancy is more likely if you miss more than 1 pill. · If you had intercourse, you can use emergency contraception to help prevent pregnancy. The most effective emergency contraception is the copper IUD (inserted by a doctor). You can also get emergency contraceptive pills without a prescription [facility]. What else do you need to know? · The pill can have side effects. ? You may have very light or skipped periods. ? You may have bleeding between periods (spotting). This usually decreases after 3 to 4 months. If you're using the pill continuously, you won't have periods. But you may still have breakthrough bleeding. Talk to your doctor if you have problems with breakthrough bleeding. Even if you have this bleeding, the pill should still work well.  ? You may have mood changes, less interest in sex, or weight gain. · The pill may reduce acne, heavy bleeding and cramping, and symptoms of premenstrual syndrome. · Check with your doctor before you use any other medicines, including over-the-counter medicines, vitamins, herbal products, and supplements. Birth control hormones may not work as well to prevent pregnancy when combined with other medicines. · The pill doesn't protect against sexually transmitted infections (STIs), such as herpes or HIV/AIDS. If you're not sure whether your sex partner(s) might have an STI, use a condom to help protect against disease. When should you call for help?    Call your doctor now or seek immediate medical care if:    · You have severe belly pain.     · You have signs of a blood clot, such as:  ? Pain in your calf, back of the knee, thigh, or groin. ? Redness and swelling in your leg or groin.     · You have blurred vision or other problems seeing.     · You have a severe headache.     · You have severe trouble breathing. Watch closely for changes in your health, and be sure to contact your doctor if:    · You think you might be pregnant.     · You think you may be depressed.     · You think you may have been exposed to or have a sexually transmitted infection. Where can you learn more? Go to https://[email or web address] org and sign in to your [employer] account. Enter D067 in the Lomography box to learn more about \"Combination Birth Control Pills: Care Instructions. \"     If you do not have an account, please click on the \"Sign Up Now\" link. Current as of: February 11, 2021               Content Version: 13.0  © 2006-2021 Healthwise, [facility]. Care instructions adapted under license by [facility] ([facility]). If you have questions about a medical condition or this instruction, always ask your healthcare professional. [place] [address] any warranty or liability for your use of this information.

## 2021-12-07 NOTE — PROGRESS NOTES
History and Physical  [address], [address] [facility] [address], [address] [facility] [address] [phone]   Fax [phone]  Carrie Cottrell  2021              31 y.o. Chief Complaint   Patient presents with    Annual Exam       Patient's last menstrual period was 2021. Primary Care Physician: Erendira Wing MD    The patient was seen and examined. She has no chief complaint today and is here for her annual exam.  Her bowels are regular. There are no voiding complaints. She denies any bloating. She denies vaginal discharge and was counseled on STD's and the need for barrier contraception. HPI : Carrie Cottrell is a 31 y.o. female     Annual exam  Concerned about weight gain while on OCP. Currently on Blisovi. Weight gain of 1 pound since last year. Been on Adipex for 3 months recently - starting in September and did not lose weight. Discussed different hormonal options . Desires IUD placement.  Will return in January for placement.________________________________________________________________________  OB History    Para Term  AB Living   1 1 1 0 0 1   SAB IAB Ectopic Molar Multiple Live Births   0 0 0 0 0 1      # Outcome Date GA Lbr Jas/2nd Weight Sex Delivery Anes PTL Lv   1 Term 14 38w4d  7 lb 15 oz (3.6 kg) M CS-LTranv Spinal N LUIS      Birth Comments: Meconium stained amniotic fluid, arrest of dilation, occiput posterior presentation      Apgar1: 8  Apgar5: 9     Past Medical History:   Diagnosis Date    Anxiety and depression 2016    Anxiety and depression     Depression 2018    Heart murmur     History of  section     Obesity 2015                                                                   Past Surgical History:   Procedure Laterality Date     SECTION, LOW TRANSVERSE  3/17/14    Arrest of dilation    TOOTH EXTRACTION       Family History   Problem Relation Age of Onset    Osteoporosis Mother         osteopenia    Other Mother         degenerative disc disease    Anxiety Disorder Mother     High Blood Pressure Father     Hypertension Maternal Grandmother     Arthritis Maternal Grandmother     Lupus Maternal Grandmother     Osteoporosis Maternal Grandmother     Breast Cancer Neg Hx     Cancer Neg Hx     Colon Cancer Neg Hx     Diabetes Neg Hx     Eclampsia Neg Hx     Ovarian Cancer Neg Hx      Labor Neg Hx     Spont Abortions Neg Hx     Stroke Neg Hx      Social History     Socioeconomic History    Marital status: Single     Spouse name: Not on file    Number of children: Not on file    Years of education: Not on file    Highest education level: Not on file   Occupational History    Not on file   Tobacco Use    Smoking status: Never Smoker    Smokeless tobacco: Never Used   Substance and Sexual Activity    Alcohol use: No    Drug use: No    Sexual activity: Yes     Partners: Male   Other Topics Concern    Not on file   Social History Narrative    Not on file     Social Determinants of Health     Financial Resource Strain: Low Risk     Difficulty of Paying Living Expenses: Not hard at all   Food Insecurity: No Food Insecurity    Worried About Running Out of Food in the Last Year: Never true    Antony of Food in the Last Year: Never true   Transportation Needs:     Lack of Transportation (Medical): Not on file    Lack of Transportation (Non-Medical):  Not on file   Physical Activity:     Days of Exercise per Week: Not on file    Minutes of Exercise per Session: Not on file   Stress:     Feeling of Stress : Not on file   Social Connections:     Frequency of Communication with Friends and Family: Not on file    Frequency of Social Gatherings with Friends and Family: Not on file    Attends Christianity Services: Not on file    Active Member of Clubs or Organizations: Not on file    Attends Club or Organization Meetings: Not on file    Marital Status: Not on file   Intimate Partner Violence:     Fear of Current or Ex-Partner: Not on file    Emotionally Abused: Not on file    Physically Abused: Not on file    Sexually Abused: Not on file   Housing Stability:     Unable to Pay for Housing in the Last Year: Not on file    Number of [place] in the Last Year: Not on file    Unstable Housing in the Last Year: Not on file       MEDICATIONS:  Current Outpatient Medications   Medication Sig Dispense Refill    Norethin Ace-Eth Estrad-FE (BLISOVI 24 FE) 1-20 MG-MCG(24) TABS Take 1 tablet by mouth daily for 28 days 28 tablet 12    buPROPion (WELLBUTRIN XL) 150 MG extended release tablet Take 1 tablet by mouth every morning 30 tablet 3    metFORMIN (GLUCOPHAGE-XR) 500 MG extended release tablet Take 1 tablet by mouth daily (with breakfast) 90 tablet 3    ibuprofen (IBU) 800 MG tablet Take 1 tablet by mouth every 8 hours as needed for Pain 90 tablet 1     No current facility-administered medications for this visit. ALLERGIES:  Allergies as of 12/07/2021    (No Known Allergies)       Symptoms of decreased mood absent  Symptoms of anhedonia absent    **If either question is answered in a  positive fashion then complete the PHQ9 Scoring Evaluation and make the appropriate referral**      Immunization status: stated as current, but no records available. Gynecologic History:  Menarche: 6 yo  Menopause at [address] yo     Patient's last menstrual period was 11/17/2021. Sexually Active: Yes    STD History: Yes chlamydia     Permanent Sterilization: No   Reversible Birth Control: Yes OCPs        Hormone Replacement Exposure: No      Genetic Qualified Family History of Breast, Ovarian , Colon or Uterine Cancer: No     If YES see scanned worksheet.     Preventative Health Testing:    Health Maintenance:  Health Maintenance Due   Topic Date Due    COVID-19 Vaccine (3 - Booster for Moderna series) 07/27/2021    Flu vaccine (1) 09/01/2021       Date of Last Pap Smear: 9/24/2019 neg  Abnormal Pap Smear History: one abnormal- repeat was normal  Colposcopy History:   Date of Last Mammogram: NA  Date of Last Colonoscopy:   Date of Last Bone Density:      ________________________________________________________________________        REVIEW OF SYSTEMS:    yes   A minimum of an eleven point review of systems was completed. Review Of Systems (11 point):  Constitutional: No fever, chills or malaise; No weight change or fatigue  Head and Eyes: No vision changes, Headache, Dizziness or trauma in last 12 months  ENT ROS: No hearing, Tinnitis, sinus or taste problems  Hematological and Lymphatic ROS:No Lymphoma, Von Willebrand's, Hemophillia or Bleeding History  Psych ROS: No Depression, Homicidal thoughts,suicidal thoughts, or anxiety  Breast ROS: No breast abnormalities or lumps  Respiratory ROS: No SOB, Pneumoniae,Cough, or Pulmonary Embolism   Cardiovascular ROS: No Chest Pain with Exertion, Palpitations, Syncope, Edema, Arrhythmia  Gastrointestinal ROS: No Indigestion, Heartburn, Nausea, vomiting, Diarrhea, Constipation,or Bowel Changes; No Bloody Stools or melena  Genito-Urinary ROS: No Dysuria, Hematuria or Nocturia.  No Urinary Incontinence or Vaginal Discharge  Musculoskeletal ROS: No Arthralgia, Arthritis,Gout,Osteoporosis or Rheumatism  Neurological ROS: No CVA, Migraines, Epilepsy, Seizure Hx, or Limb Weakness  Dermatological ROS: No Rash, Itching, Hives, Mole Changes or Cancer                                                                                                                                                                                                                                  PHYSICAL Exam:     Constitutional:  Vitals:    12/07/21 0857   BP: 116/74   Site: Left Upper Arm   Position: Sitting   Cuff Size: Medium Adult   Weight: 230 lb (104.3 kg)   Height: 5' 1\" (1.549 m)       Chaperone for Intimate Exam   Chaperone was offered and accepted as part of the rooming process.  Chaperone: Estefania          General Appearance: This  is a well Developed, well Nourished, well groomed female. Her BMI was reviewed. Nutritional decision making was discussed. Skin:  There was a Normal Inspection of the skin without rashes or lesions. There were no rashes. (Papular, Maculopapular, Hives, Pustular, Macular)     There were no lesions (Ulcers, Erythema, Abn. Appearing Nevi)            Lymphatic:  No Lymph Nodes were Palpable in the neck , axilla or groin.  0 # Of Lymph Nodes; Location ; Character [Normal]  [Shotty] [Tender] [Enlarged]     Neck and EENT:  The neck was supple. There were no masses   The thyroid was not enlarged and had no masses. Perrla, EOMI B/L, TMI B/L No Abnormalities. Throat inspected-No exudates or Masses, Nares Patent No Masses        Respiratory: The lungs were auscultated and found to be clear. There were no rales, rhonchi or wheezes. There was a good respiratory effort. Cardiovascular: The heart was in a regular rate and rhythm. . No S3 or S4. There was no murmur appreciated. Location, grade, and radiation are not applicable. Extremities: The patients extremities were without calf tenderness, edema, or varicosities. There was full range of motion in all four extremities. Pulses in all four extremities were appreciated and are 2/4. Abdomen: The abdomen was soft and non-tender. There were good bowel sounds in all quadrants and there was no guarding, rebound or rigidity. On evaluation there was no evidence of hepatosplenomegaly and there was no costal vertebral libertad tenderness bilaterally. No hernias were appreciated. Abdominal Scars: C/S scar    Psych: The patient had a normal Orientation to: Time, Place, Person, and Situation  There is no Mood / Affect changes    Breast:  (Chest)  normal appearance, no masses or tenderness  Self breast exams were reviewed in detail. Literature was given.     Pelvic Exam:  Vulva and vagina appear normal, bimanual exam reveals normal uterus and adnexa      Rectal Exam:  exam declined by patient          Musculosk:  Normal Gait and station was noted. Digits were evaluated without abnormal findings. Range of motion, stability and strength were evaluated and found to be appropriate for the patients age. ASSESSMENT:      31 y.o. Annual   Diagnosis Orders   1. Women's annual routine gynecological examination  PAP SMEAR          Chief Complaint   Patient presents with    Annual Exam          Past Medical History:   Diagnosis Date    Anxiety and depression 2016    Anxiety and depression     Depression 2018    Heart murmur     History of  section     Obesity 2015         Patient Active Problem List    Diagnosis Date Noted    Heart murmur      Priority: Low     Refer to cardiologist       Mild episode of recurrent major depressive disorder ([facility] [address] 2021    Acne vulgaris 2020    Thinning hair 2020    PCOS (polycystic ovarian syndrome) 2020    Depression 2018    Mood swing 2018    Migraine without aura and without status migrainosus, not intractable 2016    Class 3 severe obesity due to excess calories with serious comorbidity and body mass index (BMI) of 40.0 to 44.9 in adult [facility]) 2015    History of  section           Hereditary Breast, Ovarian, Colon and Uterine Cancer screening Done. Tobacco & Secondary smoke risks reviewed; instructed on cessation and avoidance      Counseling Completed:  Preventative Health Recommendations and Follow up. The patient was informed of the recommended preventative health recommendations. 1. Annuals every year; Cytology collections per prevailing guidelines. 2. Mammograms begin every year at 35 yo if no abnormalities are found and no family history. 3. Bone density studies every 2-3 years. Begin at 73 yo.  If no fracture history or osteoporosis family history. (significant). 4. Colonoscopy begin at 38 yo. Repeat every ten years if negative and no family history. 5. Calcium of 9590-0898 mg/day in split dosing  6. Vitamin D 400-800 IU/day  7. All other preventative health recommendations will be managed by the patients Primary care physician. Counseling Hormonal Based Birth Control:      The patient was seen and counseled on all forms of birth control both male and female  reversible and non. She is aware that hormonal based birth control may increase her risk of developing a blood clot which may increase her morbidity and or mortality. She was counseled on alternate non hormonal based contraception options. We discussed that smoking and any hormonal based contraception may increase the patients risks of developing these life threatening blood clots. All patients are encouraged to stop smoking at the time of contraceptive counseling. Cessation programs were reviewed. The patient was instructed to use barrier contraception for sexually transmitted disease prevention. The patient was also informed of antibiotics decreasing contraceptive efficacy and the need for barrier contraception from the onset of her antibiotic dosing and through a minimum of thirty days from antibiotic cessation. The life threatening side effect profile was reviewed in detail this includes but is not limited to shortness of breath, chest pain, severe or persistent headaches, or calf pain. If any of these occur the patient has been instructed to stop using her hormonal based contraception, notify the office, and go to the emergency department or call 911. The patient denied any personal history of blood clots in her leg, lung, or heart and denied any family history of stroke, TIA, sudden cardiac death < 36 y.o.,pulmonary embolism, or deep venous thrombosis.            PLAN:  Return in about 5 weeks (around 1/12/2022) for vaginal cultures prior to IUD placement. Pap smear collected. Information given on Mirena. To continue on Blisovi till IUD placement. Repeat Annual every 1 year  Cervical Cytology Evaluation begins at 24years old. If Negative Cytology, Follow-up screening per current guidelines. Mammograms every 1 year. If 35 yo and last mammogram was negative. Calcium and Vitamin D dosing reviewed. Colonoscopy screening reviewed as well as onset for bone density testing. Birth control and barrier recommendations discussed. STD counseling and prevention reviewed. Gardisil counseling completed for all patients 10-35 yo. Routine health maintenance per patients PCP. Orders Placed This Encounter   Procedures    PAP SMEAR     Patient History:    Patient's last menstrual period was 2021. OBGYN Status: Having periods  Past Surgical History:  3/17/14:  SECTION, LOW TRANSVERSE      Comment:  Arrest of dilation  No date: TOOTH EXTRACTION  Medications/Contraceptives Affecting Cytology     Combination Contraceptives - Oral Disp Start End     Norethin Ace-Eth Estrad-FE (BLISOVI 24 FE) 1-20 MG-MCG(24) TABS    28 tablet 2021     Sig: Take 1 tablet by mouth daily    Route: Oral       Social History    Tobacco Use      Smoking status: Never Smoker      Smokeless tobacco: Never Used       Standing Status:   Future     Standing Expiration Date:   2022     Order Specific Question:   Collection Type     Answer: Thin Prep     Order Specific Question:   Prior Abnormal Pap Test     Answer:   No     Order Specific Question:   Screening or Diagnostic     Answer:   Screening     Order Specific Question:   HPV Requested? Answer:   Yes - If Abnormal Reflex HPV     Order Specific Question:   High Risk Patient     Answer:   N/A           The patient, Frederic Garcia is a 31 y.o. female, was seen with a total time spent of 20 minutes for the visit on this date of service by the E/M provider.  The time component had both face to face and non face to face time spent in determining the total time component. Counseling and education regarding her diagnosis listed below and her options regarding those diagnoses were also included in determining her time component. Diagnosis Orders   1. Women's annual routine gynecological examination  PAP SMEAR        The patient had her preventative health maintenance recommendations and follow-up reviewed with her at the completion of her visit.

## 2021-12-16 LAB — CYTOLOGY REPORT: NORMAL

## 2021-12-17 LAB
HPV SAMPLE: ABNORMAL
HPV, GENOTYPE 16: NOT DETECTED
HPV, GENOTYPE 18: NOT DETECTED
HPV, HIGH RISK OTHER: DETECTED
HPV, INTERPRETATION: ABNORMAL
SPECIMEN DESCRIPTION: ABNORMAL

## 2021-12-20 ENCOUNTER — TELEPHONE (OUTPATIENT)
Dept: OBGYN CLINIC | Age: 26
End: 2021-12-20

## 2021-12-20 NOTE — TELEPHONE ENCOUNTER
----- Message from LUCY Lee CNP sent at 12/20/2021  3:46 PM EST -----  Pap smear LSIL  +HPV other  AGE 26  Repeat pap smear in 1 year.   Please let patient know results/ recommendations

## 2021-12-20 NOTE — TELEPHONE ENCOUNTER
----- Message from LUCY Freire CNP sent at 12/20/2021  3:46 PM EST -----  Pap smear LSIL  +HPV other  AGE 26  Repeat pap smear in 1 year.   Please let patient know results/ recommendations

## 2022-01-15 DIAGNOSIS — F33.0 MILD EPISODE OF RECURRENT MAJOR DEPRESSIVE DISORDER (HCC): ICD-10-CM

## 2022-01-17 RX ORDER — BUPROPION HYDROCHLORIDE 150 MG/1
TABLET ORAL
Qty: 30 TABLET | Refills: 3 | Status: SHIPPED | OUTPATIENT
Start: 2022-01-17 | End: 2022-03-04 | Stop reason: SDUPTHER

## 2022-01-17 NOTE — TELEPHONE ENCOUNTER
Please Approve or Refuse.   Send to Pharmacy per Pt's Request:      Next Visit Date:  Visit date not found   Last Visit Date: 11/1/2021    Hemoglobin A1C (%)   Date Value   08/31/2021 5.0   01/29/2014 5.2   01/23/2014 5.2             ( goal A1C is < 7)   BP Readings from Last 3 Encounters:   12/07/21 116/74   11/01/21 110/86   09/30/21 118/72          (goal 120/80)  BUN   Date Value Ref Range Status   01/12/2021 17 6 - 20 mg/dL Final     CREATININE   Date Value Ref Range Status   01/12/2021 0.76 0.50 - 0.90 mg/dL Final     Potassium   Date Value Ref Range Status   01/12/2021 3.9 3.7 - 5.3 mmol/L Final

## 2022-01-27 ENCOUNTER — HOSPITAL ENCOUNTER (OUTPATIENT)
Age: 27
Discharge: HOME | End: 2022-01-27
Payer: COMMERCIAL

## 2022-01-27 ENCOUNTER — HOSPITAL ENCOUNTER (OUTPATIENT)
Age: 27
Setting detail: SPECIMEN
Discharge: HOME | End: 2022-01-27

## 2022-01-27 DIAGNOSIS — Z30.09 FAMILY PLANNING: ICD-10-CM

## 2022-01-27 LAB — HCG QUANTITATIVE: <1 IU/L

## 2022-01-27 PROCEDURE — 84702 CHORIONIC GONADOTROPIN TEST: CPT

## 2022-01-27 PROCEDURE — 36415 COLL VENOUS BLD VENIPUNCTURE: CPT

## 2022-01-28 ENCOUNTER — PROCEDURE VISIT (OUTPATIENT)
Dept: OBGYN CLINIC | Age: 27
End: 2022-01-28
Payer: COMMERCIAL

## 2022-01-28 VITALS
WEIGHT: 230 LBS | DIASTOLIC BLOOD PRESSURE: 64 MMHG | BODY MASS INDEX: 43.43 KG/M2 | HEIGHT: 61 IN | SYSTOLIC BLOOD PRESSURE: 114 MMHG

## 2022-01-28 DIAGNOSIS — N94.6 DYSMENORRHEA: ICD-10-CM

## 2022-01-28 DIAGNOSIS — N94.6 DYSMENORRHEA: Primary | ICD-10-CM

## 2022-01-28 DIAGNOSIS — Z30.430 ENCOUNTER FOR IUD INSERTION: ICD-10-CM

## 2022-01-28 LAB
CANDIDA SPECIES, DNA PROBE: NEGATIVE
GARDNERELLA VAGINALIS, DNA PROBE: NEGATIVE
SOURCE: NORMAL
TRICHOMONAS VAGINALIS DNA: NEGATIVE

## 2022-01-28 PROCEDURE — 58300 INSERT INTRAUTERINE DEVICE: CPT | Performed by: OBSTETRICS & GYNECOLOGY

## 2022-01-28 RX ORDER — FAMOTIDINE 20 MG/1
TABLET, FILM COATED ORAL
COMMUNITY
End: 2022-07-14 | Stop reason: ALTCHOICE

## 2022-01-28 NOTE — PROGRESS NOTES
IUD Insertion Note        Veronica Sanchez  2022  No LMP recorded. IUD Checklist Completed with negative responses;     HPI: The patient is requesting that a Mirena IUD be inserted for Dysmenorrhea. She is known to have painful menses that interfere with her ADL's. She has tried NSAIDS in the past without success. She wishes to continue to utilize barrier contraception for family planning and STD precautions. The patient was counseled on the procedure. Risks, benefits and alternatives were reviewed. The side effect profile of the IUD was reviewed. A consent was reviewed and obtained. The patient was counseled on the need for string checks after her menses and coital activity. She is to notify the office if she can not locate the IUD string at anytime. She is aware that she will need a speculum exam and possibly an ultrasound to confirm the proper orientation of the IUD. She has no other chief complaint today. All other forms of family planning and options for treatment of her dysmennorhea were reviewed with the patient. She is not a smoker. The patient denies any family member or herself as having a blood clot in their leg, lung, brain or that any member of her family has had a sudden cardiac death under the age of 35 yo.     Past Medical History:   Diagnosis Date    Anxiety and depression 2016    Anxiety and depression     Depression 2018    Heart murmur     History of  section     Obesity 2015       Past Surgical History:   Procedure Laterality Date     SECTION, LOW TRANSVERSE  3/17/14    Arrest of dilation    TOOTH EXTRACTION         Social History     Tobacco Use    Smoking status: Never Smoker    Smokeless tobacco: Never Used   Substance Use Topics    Alcohol use: No    Drug use: No           MEDICATIONS:  Current Outpatient Medications   Medication Sig Dispense Refill    famotidine (PEPCID) 20 MG tablet       buPROPion (WELLBUTRIN XL) 150 MG extended release tablet TAKE ONE TABLET BY MOUTH EVERY MORNING 30 tablet 3    Norethin Ace-Eth Estrad-FE (BLISOVI 24 FE) 1-20 MG-MCG(24) TABS Take 1 tablet by mouth daily for 28 days 28 tablet 12    metFORMIN (GLUCOPHAGE-XR) 500 MG extended release tablet Take 1 tablet by mouth daily (with breakfast) 90 tablet 3    ibuprofen (IBU) 800 MG tablet Take 1 tablet by mouth every 8 hours as needed for Pain 90 tablet 1     Current Facility-Administered Medications   Medication Dose Route Frequency Provider Last Rate Last Admin    levonorgestrel (MIRENA) IUD 52 mg 1 each  1 each IntraUTERine Once Macy Cuadra DO             ALLERGIES:  Allergies as of 01/28/2022    (No Known Allergies)         Vitals:    01/28/22 1038   BP: 114/64   Site: Right Upper Arm   Position: Sitting   Cuff Size: Large Adult   Weight: 230 lb (104.3 kg)   Height: 5' 1\" (1.549 m)       Urine pregnancy test: negative  Cultures Completed     Chaperone for Intimate Exam   Chaperone was offered and accepted as part of the rooming process.  Chaperone: Michelle Bernard          A time out was called by the Chaperone listed above while ALL participants were quiet and attentive. The procedure to be completed was confirmed, with the appropriate laterality demarcated prior, if appropriate, as well as the patients name and a unique identifier, her date of birth. All questions were answered to her satisfaction. The consent was signed prior to the time out and all the risks were discussed in detail. The patient was positioned comfortably on the exam table. After a bi-manual exam; the uterus was found to be  anteverted. There was no cervical motion tenderness or adnexal masses. The bladder was smooth, non-tender and without palpable masses. A sterile speculum was placed without incident. The site was then cleansed with betadine and the uterus was sounded to 8 cm. The Mirena IUD was opened and loaded into the delivery system.  The wand was inserted to just past the internal portio and the button was retracted to the first line. The wand was held in place for 10 seconds and then the button was retracted to its final position while the IUD was moved to the fundus. The string was trimmed in standard fashion. Post procedure restrictions were reviewed and given to the patient. She was instructed to use barrier protection for sexually transmitted disease prevention as well as string checks/timing. The patient tolerated the procedure without difficulty. She was instructed to abstain for two weeks and use nando-pads for the first 8 weeks. She is to notify the office or go to the nearest Emergency Department if she experiences Abdominal Pain, Temperatures more than 101 F, Odiferous Vaginal Discharge, Dizziness or Shortness of breath. Counseling Hormonal Based Birth Control:      The patient was seen and counseled on all forms of birth control both male and female  reversible and non. She is aware that hormonal based birth control may increase her risk of developing a blood clot which may increase her morbidity and or mortality. She was counseled on alternate non hormonal based contraception options. We discussed that smoking and any hormonal based contraception may increase the patients risks of developing these life threatening blood clots. All patients are encouraged to stop smoking at the time of contraceptive counseling. Cessation programs were reviewed. The patient was instructed to use barrier contraception for sexually transmitted disease prevention. The patient was also informed of antibiotics decreasing contraceptive efficacy and the need for barrier contraception from the onset of her antibiotic dosing and through a minimum of thirty days from antibiotic cessation. The life threatening side effect profile was reviewed in detail this includes but is not limited to shortness of breath, chest pain, severe or persistent headaches, or calf pain. If any of these occur the patient has been instructed to stop using her hormonal based contraception, notify the office, and go to the emergency department or call 911. The patient denied any personal history of blood clots in her leg, lung, or heart and denied any family history of stroke, TIA, sudden cardiac death < 36 y.o.,pulmonary embolism, or deep venous thrombosis. ASSESSMENT:  IUD Insertion   Diagnosis Orders   1. Dysmenorrhea  C.trachomatis N.gonorrhoeae DNA    VAGINITIS DNA PROBE    WA INSERT INTRAUTERINE DEVICE    US PELVIS COMPLETE    US NON OB TRANSVAGINAL   2. Encounter for IUD insertion  C.trachomatis N.gonorrhoeae DNA    VAGINITIS DNA PROBE    WA INSERT INTRAUTERINE DEVICE    US PELVIS COMPLETE    US NON OB TRANSVAGINAL     Patient Active Problem List    Diagnosis Date Noted    Heart murmur      Priority: Low     Refer to cardiologist       Mild episode of recurrent major depressive disorder ([facility] [address] 2021    Acne vulgaris 2020    Thinning hair 2020    PCOS (polycystic ovarian syndrome) 2020    Depression 2018    Mood swing 2018    Migraine without aura and without status migrainosus, not intractable 2016    Class 3 severe obesity due to excess calories with serious comorbidity and body mass index (BMI) of 40.0 to 44.9 in adult [facility]) 2015    History of  section      Family Planning    reports that she has never smoked. She has never used smokeless tobacco.      PLAN:  Family Planning Counseling Completed  Barrier Recommendations  Removal of the Mirena IUD will be in 7 years on 2029   Annual health follow-up  reviewed  Return to office in 6 weeks for Ultrasound for IUD confirmation orientation and location.   No tampons; nando-pads only x 8 weeks reviewed  String checks reviewed      Orders Placed This Encounter   Procedures    C.trachomatis N.gonorrhoeae DNA     Standing Status:   Future     Standing Expiration Date: 7/27/2022    VAGINITIS DNA PROBE     Standing Status:   Future     Standing Expiration Date:   7/27/2022    US PELVIS COMPLETE     Standing Status:   Future     Standing Expiration Date:   1/27/2023     Order Specific Question:   Reason for exam:     Answer:   dysmenorrhea /  IUD check up    US NON OB TRANSVAGINAL     Standing Status:   Future     Standing Expiration Date:   1/27/2023     Order Specific Question:   Reason for exam:     Answer:   dysmenorrhea/ IUD insertion    MN INSERT INTRAUTERINE DEVICE

## 2022-02-27 PROBLEM — K21.9 GASTROESOPHAGEAL REFLUX DISEASE: Status: ACTIVE | Noted: 2022-02-27
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
## 2022-03-03 PROBLEM — Z97.5 IUD (INTRAUTERINE DEVICE) IN PLACE: Status: ACTIVE | Noted: 2022-03-03

## 2022-03-04 ENCOUNTER — OFFICE VISIT (OUTPATIENT)
Dept: FAMILY MEDICINE CLINIC | Age: 27
End: 2022-03-04
Payer: COMMERCIAL

## 2022-03-04 VITALS
BODY MASS INDEX: 43.99 KG/M2 | OXYGEN SATURATION: 98 % | HEIGHT: 61 IN | TEMPERATURE: 96.9 F | SYSTOLIC BLOOD PRESSURE: 138 MMHG | HEART RATE: 88 BPM | WEIGHT: 233 LBS | DIASTOLIC BLOOD PRESSURE: 80 MMHG

## 2022-03-04 DIAGNOSIS — F33.0 MILD EPISODE OF RECURRENT MAJOR DEPRESSIVE DISORDER (HCC): ICD-10-CM

## 2022-03-04 DIAGNOSIS — M54.6 CHRONIC MIDLINE THORACIC BACK PAIN: Primary | ICD-10-CM

## 2022-03-04 DIAGNOSIS — E78.00 ELEVATED LDL CHOLESTEROL LEVEL: ICD-10-CM

## 2022-03-04 DIAGNOSIS — E66.01 CLASS 3 SEVERE OBESITY DUE TO EXCESS CALORIES WITH SERIOUS COMORBIDITY AND BODY MASS INDEX (BMI) OF 40.0 TO 44.9 IN ADULT (HCC): ICD-10-CM

## 2022-03-04 DIAGNOSIS — G89.29 NECK PAIN, CHRONIC: ICD-10-CM

## 2022-03-04 DIAGNOSIS — K21.9 GASTROESOPHAGEAL REFLUX DISEASE, UNSPECIFIED WHETHER ESOPHAGITIS PRESENT: ICD-10-CM

## 2022-03-04 DIAGNOSIS — G89.29 CHRONIC MIDLINE THORACIC BACK PAIN: Primary | ICD-10-CM

## 2022-03-04 DIAGNOSIS — L65.9 THINNING HAIR: ICD-10-CM

## 2022-03-04 DIAGNOSIS — R20.0 NUMBNESS AND TINGLING IN BOTH HANDS: ICD-10-CM

## 2022-03-04 DIAGNOSIS — E28.2 PCOS (POLYCYSTIC OVARIAN SYNDROME): ICD-10-CM

## 2022-03-04 DIAGNOSIS — R63.5 WEIGHT GAIN: ICD-10-CM

## 2022-03-04 DIAGNOSIS — Z97.5 IUD (INTRAUTERINE DEVICE) IN PLACE: ICD-10-CM

## 2022-03-04 DIAGNOSIS — M54.2 NECK PAIN, CHRONIC: ICD-10-CM

## 2022-03-04 DIAGNOSIS — R20.2 NUMBNESS AND TINGLING IN BOTH HANDS: ICD-10-CM

## 2022-03-04 DIAGNOSIS — R73.9 HYPERGLYCEMIA: ICD-10-CM

## 2022-03-04 PROCEDURE — G8427 DOCREV CUR MEDS BY ELIG CLIN: HCPCS | Performed by: FAMILY MEDICINE

## 2022-03-04 PROCEDURE — 99214 OFFICE O/P EST MOD 30 MIN: CPT | Performed by: FAMILY MEDICINE

## 2022-03-04 PROCEDURE — 81003 URINALYSIS AUTO W/O SCOPE: CPT | Performed by: FAMILY MEDICINE

## 2022-03-04 PROCEDURE — 1036F TOBACCO NON-USER: CPT | Performed by: FAMILY MEDICINE

## 2022-03-04 PROCEDURE — G8484 FLU IMMUNIZE NO ADMIN: HCPCS | Performed by: FAMILY MEDICINE

## 2022-03-04 PROCEDURE — G8417 CALC BMI ABV UP PARAM F/U: HCPCS | Performed by: FAMILY MEDICINE

## 2022-03-04 RX ORDER — BUPROPION HYDROCHLORIDE 300 MG/1
TABLET ORAL
Qty: 90 TABLET | Refills: 1 | Status: SHIPPED | OUTPATIENT
Start: 2022-03-04 | End: 2022-07-14 | Stop reason: SDUPTHER

## 2022-03-04 RX ORDER — M-VIT,TX,IRON,MINS/CALC/FOLIC 27MG-0.4MG
1 TABLET ORAL DAILY
COMMUNITY

## 2022-03-04 RX ORDER — IBUPROFEN 800 MG/1
800 TABLET ORAL EVERY 8 HOURS PRN
Qty: 90 TABLET | Refills: 1 | Status: SHIPPED | OUTPATIENT
Start: 2022-03-04 | End: 2022-07-27

## 2022-03-04 ASSESSMENT — PATIENT HEALTH QUESTIONNAIRE - PHQ9
SUM OF ALL RESPONSES TO PHQ QUESTIONS 1-9: 0
1. LITTLE INTEREST OR PLEASURE IN DOING THINGS: 0
SUM OF ALL RESPONSES TO PHQ QUESTIONS 1-9: 0
9. THOUGHTS THAT YOU WOULD BE BETTER OFF DEAD, OR OF HURTING YOURSELF: 0
SUM OF ALL RESPONSES TO PHQ9 QUESTIONS 1 & 2: 0
2. FEELING DOWN, DEPRESSED OR HOPELESS: 0
SUM OF ALL RESPONSES TO PHQ QUESTIONS 1-9: 0
SUM OF ALL RESPONSES TO PHQ QUESTIONS 1-9: 0

## 2022-03-04 ASSESSMENT — ENCOUNTER SYMPTOMS
ABDOMINAL PAIN: 0
ROS SKIN COMMENTS: HAIR LOSS
BACK PAIN: 1
RESPIRATORY NEGATIVE: 1
SHORTNESS OF BREATH: 0
CHEST TIGHTNESS: 0
WHEEZING: 0

## 2022-03-04 NOTE — PATIENT INSTRUCTIONS
Patient Education        Low Sodium Diet (2,000 Milligram): Care Instructions  Overview     Limiting sodium can be an important part of managing some health problems. The most common source of sodium is salt. People get most of the salt in their diet from canned, prepared, and packaged foods. Fast food and restaurant meals also are very high in sodium. Your doctor will probably limit your sodium to less than 2,000 milligrams (mg) a day. This limit counts all the sodium in prepared and packaged foods and any salt you add to your food. Follow-up care is a key part of your treatment and safety. Be sure to make and go to all appointments, and call your doctor if you are having problems. It's also a good idea to know your test results and keep a list of the medicines you take. How can you care for yourself at home? Read food labels  · Read labels on cans and food packages. The labels tell you how much sodium is in each serving. Make sure that you look at the serving size. If you eat more than the serving size, you have eaten more sodium. · Food labels also tell you the Percent Daily Value for sodium. Choose products with low Percent Daily Values for sodium. · Be aware that sodium can come in forms other than salt, including monosodium glutamate (MSG), sodium citrate, and sodium bicarbonate (baking soda). MSG is often added to Asian food. When you eat out, you can sometimes ask for food without MSG or added salt. Buy low-sodium foods  · Buy foods that are labeled \"unsalted\" (no salt added), \"sodium-free\" (less than 5 mg of sodium per serving), or \"low-sodium\" (140 mg or less of sodium per serving). Foods labeled \"reduced-sodium\" and \"light sodium\" may still have too much sodium. Be sure to read the label to see how much sodium you are getting. · Buy fresh vegetables, or frozen vegetables without added sauces. Buy low-sodium versions of canned vegetables, soups, and other canned goods.   Prepare low-sodium meals  · Cut back on the amount of salt you use in cooking. This will help you adjust to the taste. Do not add salt after cooking. One teaspoon of salt has about 2,300 mg of sodium. · Take the salt shaker off the table. · Flavor your food with garlic, lemon juice, onion, vinegar, herbs, and spices. Do not use soy sauce, lite soy sauce, steak sauce, onion salt, garlic salt, celery salt, or ketchup on your food. · Use low-sodium salad dressings, sauces, and ketchup. Or make your own salad dressings and sauces without adding salt. · Use less salt (or none) when recipes call for it. You can often use half the salt a recipe calls for without losing flavor. Other foods such as rice, pasta, and grains do not need added salt. · Rinse canned vegetables, and cook them in fresh water. This removes some--but not all--of the salt. · Avoid water that is naturally high in sodium or that has been treated with water softeners, which add sodium. If you buy bottled water, read the label and choose a sodium-free brand. Avoid high-sodium foods  · Avoid eating:  ? Smoked, cured, salted, and canned meat, fish, and poultry. ? Ham, gasca, hot dogs, and luncheon meats. ? Regular, hard, and processed cheese and regular peanut butter. ? Crackers with salted tops, and other salted snack foods such as pretzels, chips, and salted popcorn. ? Frozen prepared meals, unless labeled low-sodium. ? Canned and dried soups, broths, and bouillon, unless labeled sodium-free or low-sodium. ? Canned vegetables, unless labeled sodium-free or low-sodium. ? [place] fries, pizza, tacos, and other fast foods. ? Pickles, olives, ketchup, and other condiments, especially soy sauce, unless labeled sodium-free or low-sodium. Where can you learn more? Go to https://rosenda.[employer]. org and sign in to your [employer] account.  Enter L267 in the [facility] box to learn more about \"Low Sodium Diet (2,000 Milligram): Care Instructions. \"     If you do not have an account, please click on the \"Sign Up Now\" link. Current as of: September 8, 2021               Content Version: 13.1  © 7903-5813 [employer], [employer]. Care instructions adapted under license by [facility] ([facility]). If you have questions about a medical condition or this instruction, always ask your healthcare professional. [address] any warranty or liability for your use of this information.

## 2022-03-10 ENCOUNTER — HOSPITAL ENCOUNTER (OUTPATIENT)
Dept: GENERAL RADIOLOGY | Age: 27
Discharge: HOME | End: 2022-03-12
Payer: COMMERCIAL

## 2022-03-10 ENCOUNTER — HOSPITAL ENCOUNTER (OUTPATIENT)
Age: 27
Discharge: HOME | End: 2022-03-12
Payer: COMMERCIAL

## 2022-03-10 ENCOUNTER — TELEPHONE (OUTPATIENT)
Dept: FAMILY MEDICINE CLINIC | Age: 27
End: 2022-03-10

## 2022-03-10 ENCOUNTER — HOSPITAL ENCOUNTER (OUTPATIENT)
Age: 27
Discharge: HOME | End: 2022-03-10
Payer: COMMERCIAL

## 2022-03-10 DIAGNOSIS — E55.9 VITAMIN D DEFICIENCY: Primary | ICD-10-CM

## 2022-03-10 DIAGNOSIS — M54.2 NECK PAIN, CHRONIC: ICD-10-CM

## 2022-03-10 DIAGNOSIS — R63.5 WEIGHT GAIN: ICD-10-CM

## 2022-03-10 DIAGNOSIS — E66.01 CLASS 3 SEVERE OBESITY DUE TO EXCESS CALORIES WITH SERIOUS COMORBIDITY AND BODY MASS INDEX (BMI) OF 40.0 TO 44.9 IN ADULT (HCC): ICD-10-CM

## 2022-03-10 DIAGNOSIS — G89.29 NECK PAIN, CHRONIC: ICD-10-CM

## 2022-03-10 DIAGNOSIS — M54.6 CHRONIC MIDLINE THORACIC BACK PAIN: ICD-10-CM

## 2022-03-10 DIAGNOSIS — R73.9 HYPERGLYCEMIA: ICD-10-CM

## 2022-03-10 DIAGNOSIS — G89.29 CHRONIC MIDLINE THORACIC BACK PAIN: ICD-10-CM

## 2022-03-10 DIAGNOSIS — E28.2 PCOS (POLYCYSTIC OVARIAN SYNDROME): ICD-10-CM

## 2022-03-10 DIAGNOSIS — E78.00 ELEVATED LDL CHOLESTEROL LEVEL: ICD-10-CM

## 2022-03-10 LAB
ABSOLUTE EOS #: 0.1 K/UL (ref 0–0.4)
ABSOLUTE LYMPH #: 2.2 K/UL (ref 1–4.8)
ABSOLUTE MONO #: 0.5 K/UL (ref 0.1–1.3)
ALBUMIN SERPL-MCNC: 4.6 G/DL (ref 3.5–5.2)
ALP BLD-CCNC: 78 U/L (ref 35–104)
ALT SERPL-CCNC: 27 U/L (ref 5–33)
ANION GAP SERPL CALCULATED.3IONS-SCNC: 12 MMOL/L (ref 9–17)
AST SERPL-CCNC: 21 U/L
BASOPHILS # BLD: 0 % (ref 0–2)
BASOPHILS ABSOLUTE: 0 K/UL (ref 0–0.2)
BILIRUB SERPL-MCNC: 0.3 MG/DL (ref 0.3–1.2)
BILIRUBIN URINE: NEGATIVE
BUN BLDV-MCNC: 22 MG/DL (ref 6–20)
CALCIUM SERPL-MCNC: 9.5 MG/DL (ref 8.6–10.4)
CHLORIDE BLD-SCNC: 103 MMOL/L (ref 98–107)
CHOLESTEROL, FASTING: 157 MG/DL
CHOLESTEROL/HDL RATIO: 3.3
CO2: 25 MMOL/L (ref 20–31)
COLOR: YELLOW
COMMENT UA: NORMAL
CREAT SERPL-MCNC: 0.64 MG/DL (ref 0.5–0.9)
EOSINOPHILS RELATIVE PERCENT: 1 % (ref 0–4)
ESTIMATED AVERAGE GLUCOSE: 103 MG/DL
GFR AFRICAN AMERICAN: >60 ML/MIN
GFR NON-AFRICAN AMERICAN: >60 ML/MIN
GFR SERPL CREATININE-BSD FRML MDRD: ABNORMAL ML/MIN/{1.73_M2}
GLUCOSE FASTING: 98 MG/DL (ref 70–99)
GLUCOSE URINE: NEGATIVE
HBA1C MFR BLD: 5.2 % (ref 4–6)
HCT VFR BLD CALC: 38.4 % (ref 36–46)
HDLC SERPL-MCNC: 48 MG/DL
HEMOGLOBIN: 13 G/DL (ref 12–16)
INSULIN COMMENT: NORMAL
INSULIN REFERENCE RANGE:: NORMAL
INSULIN: 25.9 MU/L
KETONES, URINE: NEGATIVE
LDL CHOLESTEROL: 97 MG/DL (ref 0–130)
LEUKOCYTE ESTERASE, URINE: NEGATIVE
LYMPHOCYTES # BLD: 29 % (ref 24–44)
MCH RBC QN AUTO: 28.9 PG (ref 26–34)
MCHC RBC AUTO-ENTMCNC: 33.9 G/DL (ref 31–37)
MCV RBC AUTO: 85.3 FL (ref 80–100)
MONOCYTES # BLD: 7 % (ref 1–7)
NITRITE, URINE: NEGATIVE
PDW BLD-RTO: 13.6 % (ref 11.5–14.9)
PH UA: 5 (ref 5–8)
PLATELET # BLD: 215 K/UL (ref 150–450)
PMV BLD AUTO: 9.5 FL (ref 6–12)
POTASSIUM SERPL-SCNC: 4.4 MMOL/L (ref 3.7–5.3)
PROTEIN UA: NEGATIVE
RBC # BLD: 4.5 M/UL (ref 4–5.2)
SEG NEUTROPHILS: 63 % (ref 36–66)
SEGMENTED NEUTROPHILS ABSOLUTE COUNT: 4.8 K/UL (ref 1.3–9.1)
SODIUM BLD-SCNC: 140 MMOL/L (ref 135–144)
SPECIFIC GRAVITY UA: 1.02 (ref 1–1.03)
TOTAL PROTEIN: 6.9 G/DL (ref 6.4–8.3)
TRIGLYCERIDE, FASTING: 62 MG/DL
TSH SERPL DL<=0.05 MIU/L-ACNC: 2.71 MIU/L (ref 0.3–5)
TURBIDITY: CLEAR
URINE HGB: NEGATIVE
UROBILINOGEN, URINE: NORMAL
VITAMIN D 25-HYDROXY: 36.1 NG/ML
WBC # BLD: 7.6 K/UL (ref 3.5–11)

## 2022-03-10 PROCEDURE — 80061 LIPID PANEL: CPT

## 2022-03-10 PROCEDURE — 80053 COMPREHEN METABOLIC PANEL: CPT

## 2022-03-10 PROCEDURE — 83525 ASSAY OF INSULIN: CPT

## 2022-03-10 PROCEDURE — 83036 HEMOGLOBIN GLYCOSYLATED A1C: CPT

## 2022-03-10 PROCEDURE — 81003 URINALYSIS AUTO W/O SCOPE: CPT

## 2022-03-10 PROCEDURE — 72040 X-RAY EXAM NECK SPINE 2-3 VW: CPT

## 2022-03-10 PROCEDURE — 85025 COMPLETE CBC W/AUTO DIFF WBC: CPT

## 2022-03-10 PROCEDURE — 72072 X-RAY EXAM THORAC SPINE 3VWS: CPT

## 2022-03-10 PROCEDURE — 84443 ASSAY THYROID STIM HORMONE: CPT

## 2022-03-10 PROCEDURE — 82306 VITAMIN D 25 HYDROXY: CPT

## 2022-03-10 PROCEDURE — 36415 COLL VENOUS BLD VENIPUNCTURE: CPT

## 2022-03-11 NOTE — TELEPHONE ENCOUNTER
SPOKE WITH PATIENT REGARDING RESULTS VERBALIZED UNDERSTANDING
TRIED TO CALL PATIENT NO RING TONE WHEN TRYING TO CALL x2.  WILL TRY AGAIN
Component Value Date/Time    CHOLFAST 157 03/10/2022 09:25 AM    CHOL 175 01/12/2021 09:19 AM    HDL 48 03/10/2022 09:25 AM    LDLCHOLESTEROL 97 03/10/2022 09:25 AM    TRIG 111 01/12/2021 09:19 AM    CHOLHDLRATIO 3.3 03/10/2022 09:25 AM    VLDL NOT REPORTED 01/12/2021 09:19 AM

## 2022-03-23 ENCOUNTER — OFFICE VISIT (OUTPATIENT)
Dept: OBGYN CLINIC | Age: 27
End: 2022-03-23
Payer: COMMERCIAL

## 2022-03-23 DIAGNOSIS — N94.6 DYSMENORRHEA: ICD-10-CM

## 2022-03-23 DIAGNOSIS — Z30.430 ENCOUNTER FOR IUD INSERTION: ICD-10-CM

## 2022-03-23 PROCEDURE — 76856 US EXAM PELVIC COMPLETE: CPT | Performed by: OBSTETRICS & GYNECOLOGY

## 2022-03-24 ENCOUNTER — TELEPHONE (OUTPATIENT)
Dept: OBGYN CLINIC | Age: 27
End: 2022-03-24

## 2022-03-24 NOTE — TELEPHONE ENCOUNTER
Per Lance lucas NP, pt notified of IUD in proper position and simple cyst on left ovary requiring no follow up. Pt verbalized understanding.

## 2022-03-24 NOTE — TELEPHONE ENCOUNTER
----- Message from LUCY Cortés - NP sent at 3/24/2022  8:14 AM EDT -----  IUD in proper location  Simple cyst on left ovary- no follow up needed

## 2022-04-08 ASSESSMENT — ENCOUNTER SYMPTOMS
SHORTNESS OF BREATH: 0
RESPIRATORY NEGATIVE: 1
ABDOMINAL PAIN: 0
CHEST TIGHTNESS: 0
BACK PAIN: 1
ROS SKIN COMMENTS: HAIR LOSS
WHEEZING: 0

## 2022-04-08 NOTE — PROGRESS NOTES
[facility]  [facility] [address] [facility]  [address]  [address] [place] [address]  Dept: [phone]     Carolin Lennox (:  1995) is a 31 y.o. female. Patient is here for evaluation of the following chief complaint(s):  Chief Complaint   Patient presents with    Weight Management     Dayanara lainez was DX with a rare type of bladder cancer         SUBJECTIVE/OBJECTIVE:  HPI  Carolin Lennox is a 31 y.o. female patient. Patient is an established patient of Dr. Alexis Loyd . Patient has a known history of migraine, mdd, neck pain, thoracic pain, numbness on both arms, GERD, PCOS, Mirena, and Morbid obesity. [place] PCOS/ WEIGHT  Intolerance to metformin  Patient's BMI is Body mass index is 43.65 kg/m². kg/m2. BMI is increasing. Patient understands that this condition increases the patient's risk for chronic conditions. Wt Readings from Last 3 Encounters:   22 231 lb (104.8 kg)   22 233 lb (105.7 kg)   22 230 lb (104.3 kg)     PCOS  Mirena in place- Patient is an established patient of  Dr. Manfred Phillips. Patient reports unable to tolerate metformin due to diarrhea. [place] VITAMIN D  Patient has a known Vitamin D Deficiency. she Usually covered with 12 weeks weekly dose then daily. she is due to get levels check. We continue to discuss ways to manage this condition. We also discussed ways to improve the levels. Such as learning food groups that are high in Vitamin D. We also discuss that sun exposure is beneficial however, too much sun and sun damage can potentially result in skin cancer. Lab Results   Component Value Date/Time    VITD25 36.1 03/10/2022 09:25 AM      Carolin Lennox 's Diabetes Screening -Hemoglobin A1c level was normal. Patient no familial history of diabetes. We will continue to screen annually. Lab Results   Component Value Date/Time    LABA1C 5.2 03/10/2022 09:25 AM       Patient's blood count is WNL.    Lab Results   Component Value Date    WBC 7.6 03/10/2022    HGB 13.0 03/10/2022    HCT 38.4 03/10/2022    MCV 85.3 03/10/2022     03/10/2022    LYMPHOPCT 29 03/10/2022    RBC 4.50 03/10/2022    MCH 28.9 03/10/2022    MCHC 33.9 03/10/2022    RDW 13.6 03/10/2022       THYROID FUNCTION  Louis Sawyer  's most recent TSH level was normal. Results reviewed with the patient. Patient denies any history of thyroid disorders. Lab Results   Component Value Date/Time    TSH 2.71 03/10/2022 09:25 AM        Hero Guido 's RENAL FUNCTION was found WNL. Lab Results   Component Value Date     03/10/2022    K 4.4 03/10/2022     03/10/2022    CO2 25 03/10/2022    BUN 22 (H) 03/10/2022    CREATININE 0.64 03/10/2022    GLUCOSE 91 01/12/2021    CALCIUM 9.5 03/10/2022    PROT 6.9 03/10/2022    GFR      03/10/2022       LFT's no  Lab Results   Component Value Date/Time    ALKPHOS 78 03/10/2022 09:25 AM    ALT 27 03/10/2022 09:25 AM    AST 21 03/10/2022 09:25 AM    BILITOT 0.30 03/10/2022 09:25 AM    PROT 6.9 03/10/2022 09:25 AM    LABALBU 4.6 03/10/2022 09:25 AM        Patient's urinalysis results WNL  . Lab Results   Component Value Date    COLORU Yellow 03/10/2022    NITRU NEGATIVE 03/10/2022    LEUKOCYTESUR NEGATIVE 03/10/2022    GLUCOSEU NEGATIVE 03/10/2022    KETUA NEGATIVE 03/10/2022    UROBILINOGEN Normal 03/10/2022    BILIRUBINUR NEGATIVE 03/10/2022    BILIRUBINUR negative 10/30/2018      OBESITY/ENCOUNTER FOR WEIGHT LOSS   Luz  BMI is Body mass index is 43.65 kg/m². kg/m2. Patient's BMI is increasing. Patient's main causes of weight gain are poor eating habits, health problems and inactivity. Patient have been trying to lose weight on her own without any success. So far, she  tried own diet. 3- Strong desire present every day due to reducing carbohydrate consumption, increasing protein, eliminating pop, reducing fast food consumption, increasing exercise, and reducing sugar.  Discussed low carbohydrate diet, increase physical activity, and medication prescribed: ADIPEX. Ryan Guerin  was started on Adipex. Patient is on Adipex Dose #1. Ryan Guerin  admits to appropriate appetite suppression. Patient denies any nausea, vomiting, abdominal pain, palpitations, insomnia, anxiety, headache, and chest pain. Patient's recent BMI are as follows:  BMI Readings from Last 3 Encounters:   04/12/22 43.65 kg/m²   03/04/22 44.02 kg/m²   01/28/22 43.46 kg/m²     Patient's last weight are as follows: Wt Readings from Last 3 Encounters:   04/12/22 231 lb (104.8 kg)   03/04/22 233 lb (105.7 kg)   01/28/22 230 lb (104.3 kg)        Starting Weight 231lbs-- watching portions,   Weight goal 200lbs. Current Weight 231 lbs. Ryan Guerin also utilized diet and exercise as follows:  Diet:meal prepping-- medit  Exercise:dumb bells- bands - legs--  Do not time to go to the gym do at home  Did circuit training  Talked about apps  Fitness pal  les [place]    Patient informed that when prescribed a controlled substance for weight loss, the provider is required by law to see the patient for an appointment every thirty days. This is neccessary to record the weight and blood pressure and to assess patient's efforts to lose weight, and to ensure there are no contraindications or adverse effects.    BP Readings from Last 3 Encounters:   04/12/22 120/80   03/04/22 138/80   01/28/22 114/64      Pulse Readings from Last 3 Encounters:   04/12/22 83   03/04/22 88   11/01/21 71     Wt Readings from Last 3 Encounters:   04/12/22 231 lb (104.8 kg)   03/04/22 233 lb (105.7 kg)   01/28/22 230 lb (104.3 kg)     Exercise Tracking - Detailed 11/1/2021   Walking Duration -   Walking Intensity -   Walking Times/Week -   Biking Duration -   Running Duration -   Swimming Duration -   Cardiovascular Equipment Duration -   Exercise Classes / Videos Duration 30   Exercise Classes / Videos Intensity -   Exercise Classes / Videos Times/Week 3   Other Exercise Comment -   Other Duration -   Other Intensity -   Other Times/Week -   Total Exercise Minutes/Week 90     Exercise Tracking - Detailed 11/1/2021   Walking Duration -   Walking Intensity -   Walking Times/Week -   Biking Duration -   Running Duration -   Swimming Duration -   Cardiovascular Equipment Duration -   Exercise Classes / Videos Duration 30   Exercise Classes / Videos Intensity -   Exercise Classes / Videos Times/Week 3   Other Exercise Comment -   Other Duration -   Other Intensity -   Other Times/Week -   Total Exercise Minutes/Week 90      Plan   Rudi Butler reports no use of illegal drug substances, and alcohol use. Denies pregnancy. Controlled Substance Monitoring:  Acute and Chronic Pain Monitoring:   RX Monitoring 4/12/2022   Periodic Controlled Substance Monitoring No signs of potential drug abuse or diversion identified. VITAL SIGNS:  Vitals:    04/12/22 1040   BP: 120/80   Pulse: 83   Temp: 97.1 °F (36.2 °C)   SpO2: 98%   Weight: 231 lb (104.8 kg)   Height: 5' 1\" (1.549 m)   Estimated body mass index is 43.65 kg/m² as calculated from the following:    Height as of this encounter: 5' 1\" (1.549 m). Weight as of this encounter: 231 lb (104.8 kg). Review of Systems   Constitutional: Positive for unexpected weight change. Negative for chills and fever. HENT: Negative. Respiratory: Negative. Negative for chest tightness, shortness of breath and wheezing. Cardiovascular: Negative. Negative for chest pain and palpitations. Gastrointestinal: Negative for abdominal pain. Endocrine: Negative. Musculoskeletal: Positive for arthralgias, back pain and myalgias. Neck and back pain   Skin:        Hair loss   Neurological: Positive for numbness (bilateral arms). Negative for headaches. Psychiatric/Behavioral: Negative for sleep disturbance and suicidal ideas. The patient is nervous/anxious. Physical Exam  Vitals and nursing note reviewed. Constitutional:       Appearance: Normal appearance.  She is morbidly obese.   HENT:      Head: Normocephalic. Nose: Nose normal.   Cardiovascular:      Rate and Rhythm: Normal rate and regular rhythm. Pulmonary:      Effort: Pulmonary effort is normal.      Breath sounds: Normal breath sounds. Abdominal:      General: Abdomen is protuberant. Comments: obese   Skin:     General: Skin is warm and dry. Neurological:      Mental Status: She is alert and oriented to person, place, and time. Comments: Negative jennifer's sign, negative phalen's sign   Psychiatric:         Mood and Affect: Mood is anxious. Speech: Speech is not rapid and pressured. Thought Content: Thought content does not include suicidal ideation. MEDICAL HISTORY      Diagnosis Date    Anxiety and depression 2016    Anxiety and depression     Depression 2018    Gastroesophageal reflux disease 2022    Heart murmur     History of  section     Obesity 2015      MEDICATIONS  Prior to Visit Medications    Medication Sig Taking? Authorizing Provider   phentermine (ADIPEX-P) 37.5 MG tablet Take 1 tablet by mouth every morning (before breakfast) for 30 days.  Yes LUCY Quintana CNP   vitamin D (CHOLECALCIFEROL) 25 MCG (1000 UT) TABS tablet Take 1 tablet by mouth daily Yes LUCY Quintana CNP   Multiple Vitamins-Minerals (THERAPEUTIC MULTIVITAMIN-MINERALS) tablet Take 1 tablet by mouth daily Yes Historical Provider, MD Kiki Leyva 10 Take by mouth Yes Historical Provider, MD   Elastic Bandages & Supports (WRIST SPLINT/NEOPRENE LEFT MED) MISC Use at night and when not at work Yes LUCY Quintana CNP   Elastic Bandages & Supports (WRIST SPLINT/NEOPRENE RIGHT SM) MISC Carpal tunnel splint Yes LUCY Quintana CNP   ibuprofen (IBU) 800 MG tablet Take 1 tablet by mouth every 8 hours as needed for Pain Yes LUCY Quintana CNP   buPROPion (WELLBUTRIN XL) 300 MG extended release tablet TAKE ONE TABLET BY MOUTH EVERY MORNING Yes LUCY Quintana - CNP   famotidine (PEPCID) 20 MG tablet  Yes Historical Provider, MD     Controlled Substance Monitoring:  Acute and Chronic Pain Monitoring:   RX Monitoring 4/12/2022   Periodic Controlled Substance Monitoring No signs of potential drug abuse or diversion identified. ASSESSMENT/PLAN:  1. PCOS (polycystic ovarian syndrome)  Worsening  Start adipex  Continue to monitor closely  Intolerant to metformin    - phentermine (ADIPEX-P) 37.5 MG tablet; Take 1 tablet by mouth every morning (before breakfast) for 30 days. Dispense: 30 tablet; Refill: 0    2. Vitamin D deficiency  Stable  Continue Vitamin D supplementation  DISCUSSED AND ADVISED TO:  Foods that contain a lot of vitamin D includes [place], tuna, and mackerel. Cheese, egg yolks, and beef liver have small amounts of vit D.  Milk, soy drinks, orange juice, yogurt, margarine, and some kinds of cereal have vitamin D added to them. Continue to use sunblock when out in the sun to prevent skin cancer. 3. Unable to lose weight  Failure to Improve  Start weight management  adipex first month    - phentermine (ADIPEX-P) 37.5 MG tablet; Take 1 tablet by mouth every morning (before breakfast) for 30 days. Dispense: 30 tablet; Refill: 0    4. Morbid obesity with BMI of 40.0-44.9, adult (HCC)  Failure to Improve  BMI increasing  DISCUSSED AND ADVISED TO:  Eat a low-fat and low carbohydrates diet. Avoid fried foods especially fast food. Choose healthier options for snacks. Have 5-6 servings of fruits and vegetables per day. Cut down on eating processed food. Add 30 minutes to 1 hour aerobic exercise for 3-4 days a week. - phentermine (ADIPEX-P) 37.5 MG tablet; Take 1 tablet by mouth every morning (before breakfast) for 30 days. Dispense: 30 tablet; Refill: 0    5.  Encounter for weight loss counseling  Failure to Improve  adipex then topamax if not sucessful  Consider SGLT2  Diet and exercise  DISCUSSED and ADVISED TO:  Decrease carbohydrates, sugary drinks, desserts   Exercise regularly, as tolerated. Try to lose weight. - phentermine (ADIPEX-P) 37.5 MG tablet; Take 1 tablet by mouth every morning (before breakfast) for 30 days. Dispense: 30 tablet; Refill: 0        Return in about 4 weeks (around 5/10/2022) for Pls ensure BP & Wt ready for Adipex, 30mins. This note was completed by using the assistance of a speech-recognition program. However, inadvertent computerized transcription errors may be present. Although every effort was made to ensure accuracy, no guarantees can be provided that every mistake has been identified and corrected by editing.   Electronically signed by LUCY Vargas CNP on 2/27/22 at 7:15 PM EST     --LUCY Quintana CNP

## 2022-04-12 ENCOUNTER — OFFICE VISIT (OUTPATIENT)
Dept: FAMILY MEDICINE CLINIC | Age: 27
End: 2022-04-12
Payer: COMMERCIAL

## 2022-04-12 VITALS
OXYGEN SATURATION: 98 % | WEIGHT: 231 LBS | SYSTOLIC BLOOD PRESSURE: 120 MMHG | DIASTOLIC BLOOD PRESSURE: 80 MMHG | BODY MASS INDEX: 43.61 KG/M2 | TEMPERATURE: 97.1 F | HEART RATE: 83 BPM | HEIGHT: 61 IN

## 2022-04-12 DIAGNOSIS — R63.8 UNABLE TO LOSE WEIGHT: ICD-10-CM

## 2022-04-12 DIAGNOSIS — E28.2 PCOS (POLYCYSTIC OVARIAN SYNDROME): Primary | ICD-10-CM

## 2022-04-12 DIAGNOSIS — Z71.3 ENCOUNTER FOR WEIGHT LOSS COUNSELING: ICD-10-CM

## 2022-04-12 DIAGNOSIS — E55.9 VITAMIN D DEFICIENCY: ICD-10-CM

## 2022-04-12 DIAGNOSIS — E66.01 MORBID OBESITY WITH BMI OF 40.0-44.9, ADULT (HCC): ICD-10-CM

## 2022-04-12 PROCEDURE — G8417 CALC BMI ABV UP PARAM F/U: HCPCS | Performed by: FAMILY MEDICINE

## 2022-04-12 PROCEDURE — G8427 DOCREV CUR MEDS BY ELIG CLIN: HCPCS | Performed by: FAMILY MEDICINE

## 2022-04-12 PROCEDURE — 1036F TOBACCO NON-USER: CPT | Performed by: FAMILY MEDICINE

## 2022-04-12 PROCEDURE — 99214 OFFICE O/P EST MOD 30 MIN: CPT | Performed by: FAMILY MEDICINE

## 2022-04-12 RX ORDER — PHENTERMINE HYDROCHLORIDE 37.5 MG/1
37.5 TABLET ORAL
Qty: 30 TABLET | Refills: 0 | Status: SHIPPED | OUTPATIENT
Start: 2022-04-12 | End: 2022-05-04 | Stop reason: SDUPTHER

## 2022-04-12 NOTE — PATIENT INSTRUCTIONS
Patient Education        Eating Healthy Foods: Care Instructions  Your Care Instructions     Eating healthy foods can help lower your risk for disease. Healthy food gives you energy and keeps your heart strong, your brain active, your musclesworking, and your bones strong. A healthy diet includes a variety of foods from the basic food groups: grains, vegetables, fruits, milk and milk products, and meat and beans. Some people may eat more of their favorite foods from only one food group and, as a result, miss getting the nutrients they need. So, it is important to pay attention not only to what you eat but also to what you are missing from your diet. You caneat a healthy, balanced diet by making a few small changes. Follow-up care is a key part of your treatment and safety. Be sure to make and go to all appointments, and call your doctor if you are having problems. It's also a good idea to know your test results and keep alist of the medicines you take. How can you care for yourself at home? Look at what you eat   Keep a food diary for a week or two and record everything you eat or drink. Track the number of servings you eat from each food group.  For a balanced diet every day, eat a variety of:  ? 6 or more ounce-equivalents of grains, such as cereals, breads, crackers, rice, or pasta, every day. An ounce-equivalent is 1 slice of bread, 1 cup of ready-to-eat cereal, or ½ cup of cooked rice, cooked pasta, or cooked cereal.  ? 2½ cups of vegetables, especially:  - Dark-green vegetables such as broccoli and spinach.  - Orange vegetables such as carrots and sweet potatoes. - Dry beans (such as bennett and kidney beans) and peas (such as lentils). ? 2 cups of fresh, frozen, or canned fruit. A small apple or 1 banana or orange equals 1 cup. ? 3 cups of nonfat or low-fat milk, yogurt, or other milk products. ? 5½ ounces of meat and beans, such as chicken, fish, lean meat, beans, nuts, and seeds.  One egg, 1 tablespoon of peanut butter, ½ ounce nuts or seeds, or ¼ cup of cooked beans equals 1 ounce of meat.  Learn how to read food labels for serving sizes and ingredients. Fast-food and convenience-food meals often contain few or no fruits or vegetables. Make sure you eat some fruits and vegetables to make the meal more nutritious.  Look at your food diary. For each food group, add up what you have eaten and then divide the total by the number of days. This will give you an idea of how much you are eating from each food group. See if you can find some ways to change your diet to make it more healthy. Start small   Do not try to make dramatic changes to your diet all at once. You might feel that you are missing out on your favorite foods and then be more likely to fail.  Start slowly, and gradually change your habits. Try some of the following:  ? Use whole wheat bread instead of white bread. ? Use nonfat or low-fat milk instead of whole milk. ? Eat brown rice instead of white rice, and eat whole wheat pasta instead of white-flour pasta. ? Try low-fat cheeses and low-fat yogurt. ? Add more fruits and vegetables to meals and have them for snacks. ? Add lettuce, tomato, cucumber, and onion to sandwiches. ? Add fruit to yogurt and cereal.  Enjoy food   You can still eat your favorite foods. You just may need to eat less of them. If your favorite foods are high in fat, salt, and sugar, limit how often you eat them, but do not cut them out entirely.  Eat a wide variety of foods. Make healthy choices when eating out   The type of restaurant you choose can help you make healthy choices. Even fast-food chains are now offering more low-fat or healthier choices on the menu.  Choose smaller portions, or take half of your meal home.  When eating out, try:  ? A veggie pizza with a whole wheat crust or grilled chicken (instead of sausage or pepperoni).   ? Pasta with roasted vegetables, grilled chicken, or marinara sauce instead of cream sauce. ? A vegetable wrap or grilled chicken wrap. ? Broiled or poached food instead of fried or breaded items. Make healthy choices easy   Buy packaged, prewashed, ready-to-eat fresh vegetables and fruits, such as baby carrots, salad mixes, and chopped or shredded broccoli and cauliflower.  Buy packaged, presliced fruits, such as melon or pineapple.  Choose 100% fruit or vegetable juice instead of soda. Limit juice intake to 4 to 6 oz (½ to ¾ cup) a day.  Blend low-fat yogurt, fruit juice, and canned or frozen fruit to make a smoothie for breakfast or a snack. Where can you learn more? Go to https://AutoGnomics.Instamour. org and sign in to your Pagar.me account. Enter L197 in the [employer] box to learn more about \"Eating Healthy Foods: Care Instructions. \"     If you do not have an account, please click on the \"Sign Up Now\" link. Current as of: September 8, 2021               Content Version: 13.2  © 1956-0525 [employer]. Care instructions adapted under license by [facility] ([facility]). If you have questions about a medical condition or this instruction, always ask your healthcare professional. [address] any warranty or liability for your use of this information. Patient Education        Learning About the Mediterranean Diet  What is the [address]? The Mediterranean diet is a style of eating rather than a diet plan. It features foods eaten in [place], [place], [place] and [place], and other countries along the [facility]. It emphasizes eating foods like fish, fruits, vegetables, beans, high-fiber breads and whole grains, nuts, and oliveoil. This style of eating includes limited red meat, cheese, and sweets. Why choose the Mediterranean diet? A Mediterranean-style diet may improve heart health. It contains more fat than other heart-healthy diets.  But the fats are mainly from nuts, unsaturated oils (such as fish oils and olive oil), and certain nut or seed oils (such as canola, soybean, or flaxseed oil). These fats may help protect the heart andblood vessels. How can you get started on the Mediterranean diet? Here are some things you can do to switch to a more Mediterranean way of eating. What to eat   Eat a variety of fruits and vegetables each day, such as grapes, blueberries, tomatoes, broccoli, peppers, figs, olives, spinach, eggplant, beans, lentils, and chickpeas.  Eat a variety of whole-grain foods each day, such as oats, brown rice, and whole wheat bread, pasta, and couscous.  Eat fish at least 2 times a week. Try tuna, salmon, mackerel, lake trout, herring, or sardines.  Eat moderate amounts of low-fat dairy products, such as milk, cheese, or yogurt.  Eat moderate amounts of poultry and eggs.  Choose healthy (unsaturated) fats, such as nuts, olive oil, and certain nut or seed oils like canola, soybean, and flaxseed.  Limit unhealthy (saturated) fats, such as butter, palm oil, and coconut oil. And limit fats found in animal products, such as meat and dairy products made with whole milk. Try to eat red meat only a few times a month in very small amounts.  Limit sweets and desserts to only a few times a week. This includes sugar-sweetened drinks like soda. The Mediterranean diet may also include red wine with your meal--1 glass eachday for women and up to 2 glasses a day for men. Tips for eating at home   Use herbs, spices, garlic, lemon zest, and citrus juice instead of salt to add flavor to foods.  Add avocado slices to your sandwich instead of gasca.  Have fish for lunch or dinner instead of red meat. Brush the fish with olive oil, and broil or grill it.  Sprinkle your salad with seeds or nuts instead of cheese. [place]inta Chiquito with olive or canola oil instead of butter or oils that are high in saturated fat.    Switch from 2% milk or whole milk to 1% or fat-free milk.   Dip raw vegetables in a vinaigrette dressing or hummus instead of dips made from mayonnaise or sour cream.   Have a piece of fruit for dessert instead of a piece of cake. Try baked apples, or have some dried fruit. Tips for eating out   Try broiled, grilled, baked, or poached fish instead of having it fried or breaded.  Ask your  to have your meals prepared with olive oil instead of butter.  Order dishes made with marinara sauce or sauces made from olive oil. Avoid sauces made from cream or mayonnaise.  Choose whole-grain breads, whole wheat pasta and pizza crust, brown rice, beans, and lentils.  Cut back on butter or margarine on bread. Instead, you can dip your bread in a small amount of olive oil.  Ask for a side salad or grilled vegetables instead of french fries or chips. Where can you learn more? Go to https://AtaripeRavn.Maxeler Technologies. org and sign in to your [employer] account. Enter [phone] in the [facility] box to learn more about \"Learning About the Mediterranean Diet. \"     If you do not have an account, please click on the \"Sign Up Now\" link. Current as of: September 8, 2021               Content Version: 13.2  © 4392-4529 [employer]. Care instructions adapted under license by [facility] ([facility]). If you have questions about a medical condition or this instruction, always ask your healthcare professional. [place] [address] any warranty or liability for your use of this information.

## 2022-05-02 ASSESSMENT — ENCOUNTER SYMPTOMS
RESPIRATORY NEGATIVE: 1
ROS SKIN COMMENTS: HAIR LOSS
BACK PAIN: 1
ABDOMINAL PAIN: 0
CHEST TIGHTNESS: 0
WHEEZING: 0
SHORTNESS OF BREATH: 0

## 2022-05-02 NOTE — PROGRESS NOTES
[facility] PHYSICIANS  [facility] [address] [facility]  [address]  [address] [address]  Dept: [phone]     Chevy Adam (:  1995) is a 31 y.o. female. Patient is here for evaluation of the following chief complaint(s):  Chief Complaint   Patient presents with    Weight Management     ADIPEX #2        SUBJECTIVE/OBJECTIVE:  HPI  Chevy Adam is a 31 y.o. female patient. Patient is an established patient of Dr. Gabe Boxer . Patient has a known history of migraine, mdd, neck pain, thoracic pain, numbness on both arms, GERD, PCOS, Mirena, and Morbid obesity. Adelia Mckeon PCOS/ WEIGHT  Intolerance to metformin  Patient's BMI is Body mass index is 43.57 kg/m². kg/m2. BMI is increasing. Patient understands that this condition increases the patient's risk for chronic conditions. Wt Readings from Last 3 Encounters:   22 230 lb 9.6 oz (104.6 kg)   22 231 lb (104.8 kg)   22 233 lb (105.7 kg)     PCOS  Mirena in place- Patient is an established patient of  Dr. Sujey Moctezuma. Patient reports unable to tolerate metformin due to diarrhea. .    OBESITY/ENCOUNTER FOR WEIGHT LOSS   Luz  BMI is Body mass index is 43.57 kg/m². kg/m2. Patient's BMI is increasing. Patient's main causes of weight gain are poor eating habits, health problems and inactivity. Patient have been trying to lose weight on her own without any success. So far, she  tried own diet. 3- Strong desire present every day due to reducing carbohydrate consumption, increasing protein, eliminating pop, reducing fast food consumption, increasing exercise, and reducing sugar. Discussed low carbohydrate diet, increase physical activity, and medication prescribed: ADIPEX. Darleen Cuenca  was started on Adipex. Patient is on Adipex Dose #2. Darleen Cuenca  admits to appropriate appetite suppression. Patient denies any nausea, vomiting, abdominal pain, palpitations, insomnia, anxiety, headache, and chest pain.    Patient's recent BMI are as follows:  BMI Readings from Last 3 Encounters:   05/04/22 43.57 kg/m²   04/12/22 43.65 kg/m²   03/04/22 44.02 kg/m²     Patient's last weight are as follows: Wt Readings from Last 3 Encounters:   05/04/22 230 lb 9.6 oz (104.6 kg)   04/12/22 231 lb (104.8 kg)   03/04/22 233 lb (105.7 kg)        Starting Weight 231lbs-- watching portions,   Weight goal 200lbs. Current Weight 230 lbs. [facility] also utilized diet and exercise as follows:  Diet:meal prepping-- meditation    STARTED WEIGHT WATCHERS  chiecken and vegies  Ground beef  Changed to ground turkey. Food plan with walking and   Off wednesdays and weekends. Grandma passed away  Picks  Walking three times a week  Home exercises-- instruMagic 3-4 times a week  Exercise:dumb bells- bands - legs--  Do not time to go to the gym do at home  Did circuit training  Talked about apps  Fitness pal  [place]    Patient informed that when prescribed a controlled substance for weight loss, the provider is required by law to see the patient for an appointment every thirty days. This is neccessary to record the weight and blood pressure and to assess patient's efforts to lose weight, and to ensure there are no contraindications or adverse effects.    BP Readings from Last 3 Encounters:   05/04/22 132/76   04/12/22 120/80   03/04/22 138/80      Pulse Readings from Last 3 Encounters:   05/04/22 86   04/12/22 83   03/04/22 88     Wt Readings from Last 3 Encounters:   05/04/22 230 lb 9.6 oz (104.6 kg)   04/12/22 231 lb (104.8 kg)   03/04/22 233 lb (105.7 kg)     Exercise Tracking - Detailed 5/4/2022   Walking Duration 30   Walking Intensity Moderate   Walking Times/Week 3   Biking Duration 0   Running Duration 0   Swimming Duration 0   Cardiovascular Equipment Duration 0   Exercise Classes / Videos Duration 30   Exercise Classes / Videos Intensity Moderate   Exercise Classes / Videos Times/Week 3   Strength Training Duration 0   Other Exercise Comment -   Other Duration 0 Other Intensity -   Other Times/Week -   Total Exercise Minutes/Week 180     Exercise Tracking - Detailed 5/4/2022   Walking Duration 30   Walking Intensity Moderate   Walking Times/Week 3   Biking Duration 0   Running Duration 0   Swimming Duration 0   Cardiovascular Equipment Duration 0   Exercise Classes / Videos Duration 30   Exercise Classes / Videos Intensity Moderate   Exercise Classes / Videos Times/Week 3   Strength Training Duration 0   Other Exercise Comment -   Other Duration 0   Other Intensity -   Other Times/Week -   Total Exercise Minutes/Week 180      Plan   Ada Mota reports no use of illegal drug substances, and alcohol use. Denies pregnancy. Controlled Substance Monitoring:  Acute and Chronic Pain Monitoring:   RX Monitoring 4/12/2022   Periodic Controlled Substance Monitoring No signs of potential drug abuse or diversion identified. VITAL SIGNS:  Vitals:    05/04/22 1107   BP: 132/76   Pulse: 86   Temp: 97.4 °F (36.3 °C)   SpO2: 98%   Weight: 230 lb 9.6 oz (104.6 kg)   Height: 5' 1\" (1.549 m)   Estimated body mass index is 43.57 kg/m² as calculated from the following:    Height as of this encounter: 5' 1\" (1.549 m). Weight as of this encounter: 230 lb 9.6 oz (104.6 kg). Review of Systems   Constitutional: Positive for activity change and unexpected weight change (lost weight). Negative for chills and fever. HENT: Negative. Respiratory: Negative. Negative for chest tightness, shortness of breath and wheezing. Cardiovascular: Negative. Negative for chest pain and palpitations. Gastrointestinal: Negative for abdominal pain. Endocrine: Negative. Musculoskeletal: Positive for arthralgias, back pain and myalgias. Neck and back pain   Skin:        Hair loss   Allergic/Immunologic: Negative for environmental allergies. Neurological: Positive for numbness (bilateral arms). Negative for headaches.    Psychiatric/Behavioral: Negative for sleep disturbance and suicidal ideas. The patient is nervous/anxious. Physical Exam  Vitals and nursing note reviewed. Constitutional:       Appearance: Normal appearance. She is morbidly obese. HENT:      Head: Normocephalic. Nose: Nose normal.   Cardiovascular:      Rate and Rhythm: Normal rate and regular rhythm. Pulmonary:      Effort: Pulmonary effort is normal.      Breath sounds: Normal breath sounds. Abdominal:      General: Abdomen is protuberant. Comments: obese   Skin:     General: Skin is warm and dry. Neurological:      Mental Status: She is alert and oriented to person, place, and time. Psychiatric:         Mood and Affect: Mood is anxious. Thought Content: Thought content does not include suicidal ideation. MEDICAL HISTORY      Diagnosis Date    Anxiety and depression 2016    Anxiety and depression     Depression 2018    Gastroesophageal reflux disease 2022    Heart murmur     History of  section     Obesity 2015      MEDICATIONS  Prior to Visit Medications    Medication Sig Taking? Authorizing Provider   phentermine (ADIPEX-P) 37.5 MG tablet Take 1 tablet by mouth every morning (before breakfast) for 30 days.  Yes LUCY Quintana CNP   vitamin D (CHOLECALCIFEROL) 25 MCG (1000 UT) TABS tablet Take 1 tablet by mouth daily Yes LUCY Quintana CNP   Multiple Vitamins-Minerals (THERAPEUTIC MULTIVITAMIN-MINERALS) tablet Take 1 tablet by mouth daily Yes Historical Provider, MD Kiki Leyva 10 Take by mouth Yes Historical Provider, MD   ibuprofen (IBU) 800 MG tablet Take 1 tablet by mouth every 8 hours as needed for Pain Yes LUCY Quintana CNP   buPROPion (WELLBUTRIN XL) 300 MG extended release tablet TAKE ONE TABLET BY MOUTH EVERY MORNING Yes LUCY Quintana CNP   famotidine (PEPCID) 20 MG tablet  Yes Historical Provider, MD   Elastic Bandages & Supports (WRIST SPLINT/NEOPRENE LEFT MED) [facility] Use at night and when not at work  Patient not taking: Reported on 5/4/2022  LUCY Quintana CNP   Elastic Bandages & Supports (WRIST SPLINT/NEOPRENE RIGHT SM) MISC Carpal tunnel splint  Patient not taking: Reported on 5/4/2022  LUCY Quintana CNP     Controlled Substance Monitoring:  Acute and Chronic Pain Monitoring:   RX Monitoring 4/12/2022   Periodic Controlled Substance Monitoring No signs of potential drug abuse or diversion identified. ASSESSMENT/PLAN:  1. PCOS (polycystic ovarian syndrome)  Stable  DISCUSSED and ADVISED TO:  Decrease carbohydrates, sugary drinks, desserts   Exercise regularly, as tolerated. Try to lose weight. - phentermine (ADIPEX-P) 37.5 MG tablet; Take 1 tablet by mouth every morning (before breakfast) for 30 days. Dispense: 30 tablet; Refill: 0    2. Morbid obesity with BMI of 40.0-44.9, adult ([address]  Improving  BMI decreasing  DISCUSSED AND ADVISED TO:  Eat a low-fat and low carbohydrates diet. Avoid fried foods especially fast food. Choose healthier options for snacks. Have 5-6 servings of fruits and vegetables per day. Cut down on eating processed food. Add 30 minutes to 1 hour aerobic exercise for 3-4 days a week. - phentermine (ADIPEX-P) 37.5 MG tablet; Take 1 tablet by mouth every morning (before breakfast) for 30 days. Dispense: 30 tablet; Refill: 0    3. Encounter for weight loss counseling  Failure to Improve  Continue adipex  exerice  Diet  Change exercise routine  - phentermine (ADIPEX-P) 37.5 MG tablet; Take 1 tablet by mouth every morning (before breakfast) for 30 days. Dispense: 30 tablet; Refill: 0    4. Unable to lose weight  Improving  Push exercise  Push diet  Continue adipex  Continue to monitor closely    - phentermine (ADIPEX-P) 37.5 MG tablet; Take 1 tablet by mouth every morning (before breakfast) for 30 days. Dispense: 30 tablet;  Refill: 0      Return in about 4 weeks (around 6/1/2022) for Chronic conditions, 30mins. This note was completed by using the assistance of a speech-recognition program. However, inadvertent computerized transcription errors may be present. Although every effort was made to ensure accuracy, no guarantees can be provided that every mistake has been identified and corrected by editing.   Electronically signed by LUCY Templeton CNP on 2/27/22 at 7:15 PM EST     --LUCY Quintana CNP

## 2022-05-03 SDOH — HEALTH STABILITY: PHYSICAL HEALTH: ON AVERAGE, HOW MANY DAYS PER WEEK DO YOU ENGAGE IN MODERATE TO STRENUOUS EXERCISE (LIKE A BRISK WALK)?: 3 DAYS

## 2022-05-03 SDOH — HEALTH STABILITY: PHYSICAL HEALTH: ON AVERAGE, HOW MANY MINUTES DO YOU ENGAGE IN EXERCISE AT THIS LEVEL?: 30 MIN

## 2022-05-03 ASSESSMENT — SOCIAL DETERMINANTS OF HEALTH (SDOH)
WITHIN THE LAST YEAR, HAVE YOU BEEN HUMILIATED OR EMOTIONALLY ABUSED IN OTHER WAYS BY YOUR PARTNER OR EX-PARTNER?: NO
WITHIN THE LAST YEAR, HAVE YOU BEEN KICKED, HIT, SLAPPED, OR OTHERWISE PHYSICALLY HURT BY YOUR PARTNER OR EX-PARTNER?: NO
WITHIN THE LAST YEAR, HAVE YOU BEEN AFRAID OF YOUR PARTNER OR EX-PARTNER?: NO
WITHIN THE LAST YEAR, HAVE TO BEEN RAPED OR FORCED TO HAVE ANY KIND OF SEXUAL ACTIVITY BY YOUR PARTNER OR EX-PARTNER?: NO

## 2022-05-04 ENCOUNTER — OFFICE VISIT (OUTPATIENT)
Dept: FAMILY MEDICINE CLINIC | Age: 27
End: 2022-05-04
Payer: COMMERCIAL

## 2022-05-04 VITALS
WEIGHT: 230.6 LBS | BODY MASS INDEX: 43.54 KG/M2 | HEIGHT: 61 IN | OXYGEN SATURATION: 98 % | TEMPERATURE: 97.4 F | SYSTOLIC BLOOD PRESSURE: 132 MMHG | HEART RATE: 86 BPM | DIASTOLIC BLOOD PRESSURE: 76 MMHG

## 2022-05-04 DIAGNOSIS — Z71.3 ENCOUNTER FOR WEIGHT LOSS COUNSELING: ICD-10-CM

## 2022-05-04 DIAGNOSIS — R63.8 UNABLE TO LOSE WEIGHT: ICD-10-CM

## 2022-05-04 DIAGNOSIS — E66.01 MORBID OBESITY WITH BMI OF 40.0-44.9, ADULT (HCC): ICD-10-CM

## 2022-05-04 DIAGNOSIS — E28.2 PCOS (POLYCYSTIC OVARIAN SYNDROME): ICD-10-CM

## 2022-05-04 PROCEDURE — G8427 DOCREV CUR MEDS BY ELIG CLIN: HCPCS | Performed by: FAMILY MEDICINE

## 2022-05-04 PROCEDURE — G8417 CALC BMI ABV UP PARAM F/U: HCPCS | Performed by: FAMILY MEDICINE

## 2022-05-04 PROCEDURE — 99214 OFFICE O/P EST MOD 30 MIN: CPT | Performed by: FAMILY MEDICINE

## 2022-05-04 PROCEDURE — 1036F TOBACCO NON-USER: CPT | Performed by: FAMILY MEDICINE

## 2022-05-04 RX ORDER — PHENTERMINE HYDROCHLORIDE 37.5 MG/1
37.5 TABLET ORAL
Qty: 30 TABLET | Refills: 0 | Status: SHIPPED | OUTPATIENT
Start: 2022-05-04 | End: 2022-06-16 | Stop reason: SDUPTHER

## 2022-05-04 ASSESSMENT — PATIENT HEALTH QUESTIONNAIRE - PHQ9
SUM OF ALL RESPONSES TO PHQ QUESTIONS 1-9: 9
2. FEELING DOWN, DEPRESSED OR HOPELESS: 1
4. FEELING TIRED OR HAVING LITTLE ENERGY: 3
3. TROUBLE FALLING OR STAYING ASLEEP: 3
8. MOVING OR SPEAKING SO SLOWLY THAT OTHER PEOPLE COULD HAVE NOTICED. OR THE OPPOSITE, BEING SO FIGETY OR RESTLESS THAT YOU HAVE BEEN MOVING AROUND A LOT MORE THAN USUAL: 0
5. POOR APPETITE OR OVEREATING: 0
6. FEELING BAD ABOUT YOURSELF - OR THAT YOU ARE A FAILURE OR HAVE LET YOURSELF OR YOUR FAMILY DOWN: 1
SUM OF ALL RESPONSES TO PHQ QUESTIONS 1-9: 9
10. IF YOU CHECKED OFF ANY PROBLEMS, HOW DIFFICULT HAVE THESE PROBLEMS MADE IT FOR YOU TO DO YOUR WORK, TAKE CARE OF THINGS AT HOME, OR GET ALONG WITH OTHER PEOPLE: 0
7. TROUBLE CONCENTRATING ON THINGS, SUCH AS READING THE NEWSPAPER OR WATCHING TELEVISION: 1
SUM OF ALL RESPONSES TO PHQ QUESTIONS 1-9: 9
SUM OF ALL RESPONSES TO PHQ QUESTIONS 1-9: 9
9. THOUGHTS THAT YOU WOULD BE BETTER OFF DEAD, OR OF HURTING YOURSELF: 0

## 2022-05-04 NOTE — PROGRESS NOTES
Visit Information    Have you changed or started any medications since your last visit including any over-the-counter medicines, vitamins, or herbal medicines? no   Have you stopped taking any of your medications? Is so, why? -  no  Are you having any side effects from any of your medications? - no    Have you seen any other physician or provider since your last visit?  no   Have you had any other diagnostic tests since your last visit?  no   Have you been seen in the emergency room and/or had an admission in a hospital since we last saw you?  no   Have you had your routine dental cleaning in the past 6 months?  no     Do you have an active MyChart account? If no, what is the barrier?   Yes    Patient Care Team:  LUCY Fisher CNP as PCP - General (Family Medicine)  Geni Lee MD as PCP - [facility] Provider  Oly Parkinson DO as Consulting Physician (Obstetrics & Gynecology)  Darcey Simmonds, MD as Consulting Physician (Internal Medicine Cardiovascular Disease)  Oly Parkinson DO as Consulting Physician (Obstetrics & Gynecology)    Medical History Review  Past Medical, Family, and Social History reviewed and does contribute to the patient presenting condition    Health Maintenance   Topic Date Due    COVID-19 Vaccine (3 - Booster for Moderna series) 06/27/2021    Flu vaccine (Season Ended) 09/01/2022    Depression Monitoring  03/04/2023    DTaP/Tdap/Td vaccine (4 - Td or Tdap) 05/04/2024    Pap smear  12/07/2024    Hepatitis C screen  Completed    HIV screen  Completed    Hepatitis A vaccine  Aged Out    Hepatitis B vaccine  Aged Out    Hib vaccine  Aged Out    Meningococcal (ACWY) vaccine  Aged Out    Pneumococcal 0-64 years Vaccine  Aged Out    Varicella vaccine  Discontinued

## 2022-05-04 NOTE — PATIENT INSTRUCTIONS
Patient Education        Learning About the Mediterranean Diet  What is the [address]? The Mediterranean diet is a style of eating rather than a diet plan. It features foods eaten in [place], [place], [place] and [place], and other countries along the [facility]. It emphasizes eating foods like fish, fruits, vegetables, beans, high-fiber breads and whole grains, nuts, and oliveoil. This style of eating includes limited red meat, cheese, and sweets. Why choose the Mediterranean diet? A Mediterranean-style diet may improve heart health. It contains more fat than other heart-healthy diets. But the fats are mainly from nuts, unsaturated oils (such as fish oils and olive oil), and certain nut or seed oils (such as canola, soybean, or flaxseed oil). These fats may help protect the heart andblood vessels. How can you get started on the Mediterranean diet? Here are some things you can do to switch to a more Mediterranean way of eating. What to eat   Eat a variety of fruits and vegetables each day, such as grapes, blueberries, tomatoes, broccoli, peppers, figs, olives, spinach, eggplant, beans, lentils, and chickpeas.  Eat a variety of whole-grain foods each day, such as oats, brown rice, and whole wheat bread, pasta, and couscous.  Eat fish at least 2 times a week. Try tuna, salmon, mackerel, lake trout, herring, or sardines.  Eat moderate amounts of low-fat dairy products, such as milk, cheese, or yogurt.  Eat moderate amounts of poultry and eggs.  Choose healthy (unsaturated) fats, such as nuts, olive oil, and certain nut or seed oils like canola, soybean, and flaxseed.  Limit unhealthy (saturated) fats, such as butter, palm oil, and coconut oil. And limit fats found in animal products, such as meat and dairy products made with whole milk. Try to eat red meat only a few times a month in very small amounts.  Limit sweets and desserts to only a few times a week.  This includes sugar-sweetened drinks like soda. The Mediterranean diet may also include red wine with your meal--1 glass eachday for women and up to 2 glasses a day for men. Tips for eating at home   Use herbs, spices, garlic, lemon zest, and citrus juice instead of salt to add flavor to foods.  Add avocado slices to your sandwich instead of gasca.  Have fish for lunch or dinner instead of red meat. Brush the fish with olive oil, and broil or grill it.  Sprinkle your salad with seeds or nuts instead of cheese. Luan Necessary with olive or canola oil instead of butter or oils that are high in saturated fat.  Switch from 2% milk or whole milk to 1% or fat-free milk.  Dip raw vegetables in a vinaigrette dressing or hummus instead of dips made from mayonnaise or sour cream.   Have a piece of fruit for dessert instead of a piece of cake. Try baked apples, or have some dried fruit. Tips for eating out   Try broiled, grilled, baked, or poached fish instead of having it fried or breaded.  Ask your  to have your meals prepared with olive oil instead of butter.  Order dishes made with marinara sauce or sauces made from olive oil. Avoid sauces made from cream or mayonnaise.  Choose whole-grain breads, whole wheat pasta and pizza crust, brown rice, beans, and lentils.  Cut back on butter or margarine on bread. Instead, you can dip your bread in a small amount of olive oil.  Ask for a side salad or grilled vegetables instead of french fries or chips. Where can you learn more? Go to https://ibabyboxyadyewApiFix.L4 Mobile. org and sign in to your [employer] account. Enter [phone] in the [facility] box to learn more about \"Learning About the Mediterranean Diet. \"     If you do not have an account, please click on the \"Sign Up Now\" link. Current as of: September 8, 2021               Content Version: 13.2  © 7828-6540 Healthwise, [employer]. Care instructions adapted under license by [facility] ([facility]).  If you have questions about a medical condition or this instruction, always ask your healthcare professional. [address] any warranty or liability for your use of this information. Patient Education        Learning About Mindfulness for Stress  What are mindfulness and stress? Stress is what you feel when you have to handle more than you are used to. A lot of things can cause stress. You may feel stress when you go on a job interview, take a test, or run a race. This kind of short-term stress is normaland even useful. It can help you if you need to work hard or react quickly. Stress also can last a long time. Long-term stress is caused by stressful situations or events. Examples of long-term stress include long-term health problems, ongoing problems at work, and conflicts in your family. Long-termstress can harm your health. Mindfulness is a focus only on things happening in the present moment. It's a process of purposefully paying attention to and being aware of your surroundings, your emotions, your thoughts, and how your body feels. You are aware of these things, but you aren't judging these experiences as \"good\" or \"bad. \" Mindfulness can help you learn to calm your mind and body to help youcope with illness, pain, and stress. How does mindfulness help to relieve stress? Mindfulness can help quiet your mind and relax your body. Studies show that it can help some people sleep better, feel less anxious, and bring their blood pressure down. And it's been shown to help some people live and cope better with certain health problems like heart disease, depression, chronic pain, andcancer. How do you practice mindfulness? To be mindful is to pay attention, to be present, and to be accepting.  When you're mindful, you do just one thing and you pay close attention to that one thing. For example, you may sit quietly and notice your emotions or how your food tastes and smells.    When you're present, you focus on the things that are happening right now. You let go of your thoughts about the past and the future. When you dwell on the past or the future, you miss moments that can heal and strengthen you. You may miss moments like hearing a child laugh or seeing a friendly face when you think you're all alone.  When you're accepting, you don't  the present moment. Instead you accept your thoughts and feelings as they come. You can practice anytime, anywhere, and in any way you choose. You can practicein many ways. Here are a few ideas:   While doing your chores, like washing the dishes, let your mind focus on what's in your hand. What does the dish feel like? Is the water warm or cold?  Go outside and take a few deep breaths. What is the air like? Is it warm or cold?  When you can, take some time at the start of your day to sit alone and think.  Take a slow walk by yourself. Count your steps while you breathe in and out.  Try yoga breathing exercises, stretches, and poses to strengthen and relax your muscles.  At work, if you can, try to stop for a few moments each hour. Note how your body feels. Let yourself regroup and let your mind settle before you return to what you were doing.  If you struggle with anxiety or \"worry thoughts,\" imagine your mind as a blue sabrina and your worry thoughts as clouds. Now imagine those worry thoughts floating across your mind's sabrina. Just let them pass by as you watch. Follow-up care is a key part of your treatment and safety. Be sure to make and go to all appointments, and call your doctor if you are having problems. It's also a good idea to know your test results and keep alist of the medicines you take. Where can you learn more? Go to https://chjennifereb.TestPlant. org and sign in to your [employer] account. Enter K3 in the [employer] box to learn more about \"Learning About Mindfulness for Stress. \"     If you do not have an account, please click on the \"Sign Up Now\" link. Current as of: June 16, 2021               Content Version: 13.2  © 2006-2022 Healthwise, Incorporated. Care instructions adapted under license by [facility] ([facility]). If you have questions about a medical condition or this instruction, always ask your healthcare professional. [address] any warranty or liability for your use of this information.

## 2022-05-20 ENCOUNTER — HOSPITAL ENCOUNTER (OUTPATIENT)
Age: 27
Setting detail: SPECIMEN
Discharge: HOME | End: 2022-05-20

## 2022-05-20 ENCOUNTER — OFFICE VISIT (OUTPATIENT)
Dept: OBGYN CLINIC | Age: 27
End: 2022-05-20
Payer: COMMERCIAL

## 2022-05-20 VITALS
DIASTOLIC BLOOD PRESSURE: 72 MMHG | SYSTOLIC BLOOD PRESSURE: 116 MMHG | WEIGHT: 230 LBS | BODY MASS INDEX: 43.43 KG/M2 | HEIGHT: 61 IN

## 2022-05-20 DIAGNOSIS — R10.2 PELVIC PAIN: ICD-10-CM

## 2022-05-20 DIAGNOSIS — R10.2 PELVIC PAIN: Primary | ICD-10-CM

## 2022-05-20 LAB
-: NORMAL
BILIRUBIN URINE: NEGATIVE
CANDIDA SPECIES, DNA PROBE: NEGATIVE
CASTS UA: NORMAL /LPF (ref 0–8)
COLOR: YELLOW
EPITHELIAL CELLS UA: NORMAL /HPF (ref 0–5)
GARDNERELLA VAGINALIS, DNA PROBE: POSITIVE
GLUCOSE URINE: NEGATIVE
KETONES, URINE: NEGATIVE
LEUKOCYTE ESTERASE, URINE: NEGATIVE
NITRITE, URINE: NEGATIVE
PH UA: 6 (ref 5–8)
PROTEIN UA: NEGATIVE
RBC UA: NORMAL /HPF (ref 0–4)
SOURCE: ABNORMAL
SPECIFIC GRAVITY UA: 1.02 (ref 1–1.03)
TRICHOMONAS VAGINALIS DNA: NEGATIVE
TURBIDITY: ABNORMAL
URINE HGB: ABNORMAL
UROBILINOGEN, URINE: NORMAL
WBC UA: NORMAL /HPF (ref 0–5)

## 2022-05-20 PROCEDURE — G8417 CALC BMI ABV UP PARAM F/U: HCPCS | Performed by: NURSE PRACTITIONER

## 2022-05-20 PROCEDURE — G8427 DOCREV CUR MEDS BY ELIG CLIN: HCPCS | Performed by: NURSE PRACTITIONER

## 2022-05-20 PROCEDURE — 1036F TOBACCO NON-USER: CPT | Performed by: NURSE PRACTITIONER

## 2022-05-20 PROCEDURE — 99213 OFFICE O/P EST LOW 20 MIN: CPT | Performed by: NURSE PRACTITIONER

## 2022-05-20 NOTE — PROGRESS NOTES
Tristen Gutierrez  2022    YOB: 1995          The patient was seen today. She is here regarding pelvic pain. When patient sexually active, has pain with climax for the past 3 weeks. Also has cramping intermittent throughout the day. Pain lasts for a few minutes to 45 minutes. Reports down low. Denies abnormal vaginal discharge. Been with fiance for 3 years. Mirena IUD in place 2022. Her bowels are regular and she is voiding without difficulty.      HPI:  Tristen Gutierrez is a 31 y.o. female      Pelvic pain  IUD in place 2022      OB History    Para Term  AB Living   1 1 1 0 0 1   SAB IAB Ectopic Molar Multiple Live Births   0 0 0 0 0 1      # Outcome Date GA Lbr Jas/2nd Weight Sex Delivery Anes PTL Lv   1 Term 14 38w4d  7 lb 15 oz (3.6 kg) M CS-LTranv Spinal N LUIS      Birth Comments: Meconium stained amniotic fluid, arrest of dilation, occiput posterior presentation      Apgar1: 8  Apgar5: 9       Past Medical History:   Diagnosis Date    Anxiety and depression 2016    Anxiety and depression     Depression 2018    Gastroesophageal reflux disease 2022    Heart murmur     History of  section     Obesity 2015       Past Surgical History:   Procedure Laterality Date     SECTION, LOW TRANSVERSE  3/17/14    Arrest of dilation    TOOTH EXTRACTION         Family History   Problem Relation Age of Onset    Osteoporosis Mother         osteopenia    Other Mother         degenerative disc disease    Anxiety Disorder Mother     High Blood Pressure Father     Hypertension Maternal Grandmother     Arthritis Maternal Grandmother     Lupus Maternal Grandmother     Osteoporosis Maternal Grandmother     Cancer Maternal Grandmother         bladder cancer     Breast Cancer Neg Hx     Colon Cancer Neg Hx     Diabetes Neg Hx     Eclampsia Neg Hx     Ovarian Cancer Neg Hx      Labor Neg Hx     Spont Abortions Neg Hx     Stroke Neg Hx        Social History     Socioeconomic History    Marital status: Single     Spouse name: Not on file    Number of children: Not on file    Years of education: Not on file    Highest education level: Not on file   Occupational History    Not on file   Tobacco Use    Smoking status: Never Smoker    Smokeless tobacco: Never Used   Substance and Sexual Activity    Alcohol use: No    Drug use: No    Sexual activity: Yes     Partners: Male   Other Topics Concern    Not on file   Social History Narrative    Not on file     Social Determinants of Health     Financial Resource Strain: Low Risk     Difficulty of Paying Living Expenses: Not hard at all   Food Insecurity: No Food Insecurity    Worried About [address] in the Last Year: Never true    [email or web address] [employer] in the Last Year: Never true   Transportation Needs:     Lack of Transportation (Medical): Not on file    Lack of Transportation (Non-Medical):  Not on file   Physical Activity: Insufficiently Active    Days of Exercise per Week: 3 days    Minutes of Exercise per Session: 30 min   Stress:     Feeling of Stress : Not on file   Social Connections:     Frequency of Communication with Friends and Family: Not on file    Frequency of Social Gatherings with Friends and Family: Not on file    Attends Roman Catholic Services: Not on file    Active Member of Clubs or Organizations: Not on file    Attends Club or Organization Meetings: Not on file    Marital Status: Not on file   Intimate Partner Violence: Not At Risk    Fear of Current or Ex-Partner: No    Emotionally Abused: No    Physically Abused: No    Sexually Abused: No   Housing Stability:     Unable to Pay for Housing in the Last Year: Not on file    Number of [place] in the Last Year: Not on file    Unstable Housing in the Last Year: Not on file         MEDICATIONS:  Current Outpatient Medications   Medication Sig Dispense Refill    phentermine (ADIPEX-P) 37.5 MG tablet Take 1 tablet by mouth every morning (before breakfast) for 30 days. 30 tablet 0    vitamin D (CHOLECALCIFEROL) 25 MCG (1000 UT) TABS tablet Take 1 tablet by mouth daily 90 tablet 5    Multiple Vitamins-Minerals (THERAPEUTIC MULTIVITAMIN-MINERALS) tablet Take 1 tablet by mouth daily      BIOTIN BEAUTY EXTRA STRENGTH PO Take by mouth      ibuprofen (IBU) 800 MG tablet Take 1 tablet by mouth every 8 hours as needed for Pain 90 tablet 1    buPROPion (WELLBUTRIN XL) 300 MG extended release tablet TAKE ONE TABLET BY MOUTH EVERY MORNING 90 tablet 1    famotidine (PEPCID) 20 MG tablet       Elastic Bandages & Supports (WRIST SPLINT/NEOPRENE LEFT MED) MISC Use at night and when not at work (Patient not taking: Reported on 5/4/2022) 1 each 0    Elastic Bandages & Supports (WRIST SPLINT/NEOPRENE RIGHT SM) MISC Carpal tunnel splint (Patient not taking: Reported on 5/4/2022) 1 each 0     Current Facility-Administered Medications   Medication Dose Route Frequency Provider Last Rate Last Admin    levonorgestrel (MIRENA) IUD 52 mg 1 each  1 each IntraUTERine Once Cara Sarah, DO   1 each at 01/28/22 1154             ALLERGIES:  Allergies as of 05/20/2022    (No Known Allergies)         REVIEW OF SYSTEMS:    yes   A minimum of an eleven point review of systems was completed. Review Of Systems (11 point):  Constitutional: No fever, chills or malaise;  No weight change or fatigue  Head and Eyes: No vision, Headache, Dizziness or trauma in last 12 months  ENT ROS: No hearing, Tinnitis, sinus or taste problems  Hematological and Lymphatic ROS:No Lymphoma, Von Willebrand's, Hemophillia or Bleeding History  Psych ROS: No Depression, Homicidal thoughts,suicidal thoughts, or anxiety  Breast ROS: No prior breast abnormalities or lumps  Respiratory ROS: No SOB, Pneumoniae,Cough, or Pulmonary Embolism History  Cardiovascular ROS: No Chest Pain with Exertion, Palpitations, Syncope, Edema, Arrhythmia  Gastrointestinal ROS: No Indigestion, Heartburn, Nausea, vomiting, Diarrhea, Constipation,or Bowel Changes; No Bloody Stools or melena  Genito-Urinary ROS: No Dysuria, Hematuria or Nocturia. No Urinary Incontinence or Vaginal Discharge. + pelvic pain  Musculoskeletal ROS: No Arthralgia, Arthritis,Gout,Osteoporosis or Rheumatism  Neurological ROS: No CVA, Migraines, Epilepsy, Seizure Hx, or Limb Weakness  Dermatological ROS: No Rash, Itching, Hives, Mole Changes or Cancer          Blood pressure 116/72, height 5' 1\" (1.549 m), weight 230 lb (104.3 kg), not currently breastfeeding. Chaperone for Intimate Exam   Chaperone was offered and accepted as part of the rooming process.  Chaperone: Felice         Abdomen: Soft non-tender; good bowel sounds. No guarding, rebound or rigidity. No CVA tenderness bilaterally. Extremities: No calf tenderness, DTR 2/4, and No edema bilaterally    Pelvic: Vulva and vagina appear normal. Bimanual exam reveals normal uterus and adnexa. IUD strings visualized. Diagnostics:  No results found.     Lab Results:  Results for orders placed or performed during the hospital encounter of 03/10/22   CBC with Auto Differential   Result Value Ref Range    WBC 7.6 3.5 - 11.0 k/uL    RBC 4.50 4.0 - 5.2 m/uL    Hemoglobin 13.0 12.0 - 16.0 g/dL    Hematocrit 38.4 36 - 46 %    MCV 85.3 80 - 100 fL    MCH 28.9 26 - 34 pg    MCHC 33.9 31 - 37 g/dL    RDW 13.6 11.5 - 14.9 %    Platelets 786 767 - 262 k/uL    MPV 9.5 6.0 - 12.0 fL    Seg Neutrophils 63 36 - 66 %    Lymphocytes 29 24 - 44 %    Monocytes 7 1 - 7 %    Eosinophils % 1 0 - 4 %    Basophils 0 0 - 2 %    Segs Absolute 4.80 1.3 - 9.1 k/uL    Absolute Lymph # 2.20 1.0 - 4.8 k/uL    Absolute Mono # 0.50 0.1 - 1.3 k/uL    Absolute Eos # 0.10 0.0 - 0.4 k/uL    Basophils Absolute 0.00 0.0 - 0.2 k/uL   Comprehensive Metabolic Panel, Fasting   Result Value Ref Range    Glucose, Fasting 98 70 - 99 mg/dL    BUN 22 (H) 6 - 20 mg/dL CREATININE 0.64 0.50 - 0.90 mg/dL    Calcium 9.5 8.6 - 10.4 mg/dL    Sodium 140 135 - 144 mmol/L    Potassium 4.4 3.7 - 5.3 mmol/L    Chloride 103 98 - 107 mmol/L    CO2 25 20 - 31 mmol/L    Anion Gap 12 9 - 17 mmol/L    Alkaline Phosphatase 78 35 - 104 U/L    ALT 27 5 - 33 U/L    AST 21 <32 U/L    Total Bilirubin 0.30 0.3 - 1.2 mg/dL    Total Protein 6.9 6.4 - 8.3 g/dL    Albumin 4.6 3.5 - 5.2 g/dL    GFR Non-African American >60 >60 mL/min    GFR African American >60 >60 mL/min    GFR Comment         Hemoglobin A1C   Result Value Ref Range    Hemoglobin A1C 5.2 4.0 - 6.0 %    Estimated Avg Glucose 103 mg/dL   Lipid, Fasting   Result Value Ref Range    Cholesterol, Fasting 157 <200 mg/dL    HDL 48 >40 mg/dL    LDL Cholesterol 97 0 - 130 mg/dL    Chol/HDL Ratio 3.3 <5    Triglyceride, Fasting 62 <150 mg/dL   Vitamin D 25 Hydroxy   Result Value Ref Range    Vit D, 25-Hydroxy 36.1 >29.9 ng/mL   Insulin, Total   Result Value Ref Range    Insulin Comment MORNING     Insulin 25.9 mU/L    Insulin Reference Range:         TSH   Result Value Ref Range    TSH 2.71 0.30 - 5.00 mIU/L   Urinalysis with Reflex to Culture    Specimen: Urine voided   Result Value Ref Range    Color, UA Yellow Yellow    Turbidity UA Clear Clear    Glucose, Ur NEGATIVE NEGATIVE    Bilirubin Urine NEGATIVE NEGATIVE    Ketones, Urine NEGATIVE NEGATIVE    Specific Gravity, UA 1.022 1.000 - 1.030    Urine Hgb NEGATIVE NEGATIVE    pH, UA 5.0 5.0 - 8.0    Protein, UA NEGATIVE NEGATIVE    Urobilinogen, Urine Normal Normal    Nitrite, Urine NEGATIVE NEGATIVE    Leukocyte Esterase, Urine NEGATIVE NEGATIVE    Urinalysis Comments       Microscopic exam not performed based on chemical results unless requested in original order. Assessment:   Diagnosis Orders   1.  Pelvic pain  US PELVIS COMPLETE    US NON OB TRANSVAGINAL    Vaginitis DNA Probe    C.trachomatis N.gonorrhoeae DNA    Urinalysis with Reflex to Culture     Patient Active Problem List Diagnosis Date Noted    Heart murmur      Priority: Low     Refer to cardiologist       Vitamin D deficiency 2022    Chronic midline thoracic back pain 2022    Neck pain, chronic 2022    Numbness and tingling in both hands 2022    IUD (intrauterine device) in place 2022    Gastroesophageal reflux disease 2022    Mild episode of recurrent major depressive disorder ([facility] [address] 2021    Acne vulgaris 2020    Thinning hair 2020    PCOS (polycystic ovarian syndrome) 2020    Depression 2018    Mood swing 2018    Migraine without aura and without status migrainosus, not intractable 2016    Class 3 severe obesity due to excess calories with serious comorbidity and body mass index (BMI) of 40.0 to 44.9 in adult [facility]) 2015    History of  section            PLAN:  Return if symptoms worsen or fail to improve. Pelvic ultrasound ordered. Lab slips given. If pelvic pain continues, follow up with physician. Repeat Annual every 1 year  Cervical Cytology Evaluation begins at 24years old. If Negative Cytology, Follow-up screening per current guidelines. Return to the office in PRN weeks. Counseled on preventative health maintenance follow-up.   Orders Placed This Encounter   Procedures    Vaginitis DNA Probe     Standing Status:   Future     Standing Expiration Date:   2023    C.trachomatis N.gonorrhoeae DNA     Standing Status:   Future     Standing Expiration Date:   2022    US PELVIS COMPLETE     Standing Status:   Future     Standing Expiration Date:   2022     Order Specific Question:   Reason for exam:     Answer:   pelvic pain    US NON OB TRANSVAGINAL     Standing Status:   Future     Standing Expiration Date:   2022     Order Specific Question:   Reason for exam:     Answer:   pelvic pain    Urinalysis with Reflex to Culture     Standing Status:   Future     Standing Expiration Date:

## 2022-05-23 ENCOUNTER — TELEPHONE (OUTPATIENT)
Dept: OBGYN CLINIC | Age: 27
End: 2022-05-23

## 2022-05-23 RX ORDER — METRONIDAZOLE 500 MG/1
500 TABLET ORAL 2 TIMES DAILY
Qty: 14 TABLET | Refills: 0 | Status: SHIPPED | OUTPATIENT
Start: 2022-05-23 | End: 2022-05-30

## 2022-05-23 NOTE — TELEPHONE ENCOUNTER
Patient notified of results and recommendations, script for flagyl to be sent to [facility].

## 2022-05-23 NOTE — TELEPHONE ENCOUNTER
----- Message from LUCY Lozano CNP sent at 5/23/2022  7:49 AM EDT -----  +BV- flagyl 500mg PO bID x7 days

## 2022-06-14 ENCOUNTER — OFFICE VISIT (OUTPATIENT)
Dept: OBGYN CLINIC | Age: 27
End: 2022-06-14
Payer: COMMERCIAL

## 2022-06-14 DIAGNOSIS — R10.2 PELVIC PAIN: ICD-10-CM

## 2022-06-14 PROCEDURE — 76856 US EXAM PELVIC COMPLETE: CPT | Performed by: OBSTETRICS & GYNECOLOGY

## 2022-06-14 ASSESSMENT — ENCOUNTER SYMPTOMS
CHEST TIGHTNESS: 0
WHEEZING: 0
ABDOMINAL PAIN: 0
BACK PAIN: 1
ROS SKIN COMMENTS: HAIR LOSS
RESPIRATORY NEGATIVE: 1
SHORTNESS OF BREATH: 0

## 2022-06-15 ENCOUNTER — TELEPHONE (OUTPATIENT)
Dept: OBGYN CLINIC | Age: 27
End: 2022-06-15

## 2022-06-15 NOTE — PATIENT INSTRUCTIONS
New Updates for [facility] MyChart/ [facility] ([facility]) NEVAEH    Thank you for choosing US to give you the best care! [employer] ([facility]) is always trying to think of new ways to help their patients. We are asking all patients to try out the new digital registration that is now available through your [facility] account or the new NEV[facility], [facility] ([facility]). Via the nevaeh you're now able to update your personal and registration information prior to your upcoming appointment. This will save you time once you arrive at the office to check-in, not to mention your information remains safe!! Many other perks come from signing up for an account, such as:   Requesting refills   Scheduling an appointment   Completing an [address] Sending a message to the office/provider   Having access to your medication list   Paying your bill/copay prior to your appointment   Scheduling your yearly mammogram   Review your test results    If you are not familiar with [facility] or the [facility] ([facility]) NEVAEH, please ask one of us and we will be happy to answer any questions or help you set-up your account. Your [facility] office,  [facility]    Patient Education        Learning About the Mediterranean Diet  What is the Mediterranean diet? The Mediterranean diet is a style of eating rather than a diet plan. It features foods eaten in [place], [place], [place] and [place], and other countries along the [facility]. It emphasizes eating foods like fish, fruits, vegetables, beans, high-fiber breads and whole grains, nuts, and oliveoil. This style of eating includes limited red meat, cheese, and sweets. Why choose the Mediterranean diet? A Mediterranean-style diet may improve heart health. It contains more fat than other heart-healthy diets. But the fats are mainly from nuts, unsaturated oils (such as fish oils and olive oil), and certain nut or seed oils (such as canola, soybean, or flaxseed oil).  These fats may help protect the heart andblood vessels. How can you get started on the Mediterranean diet? Here are some things you can do to switch to a more Mediterranean way of eating. What to eat   Eat a variety of fruits and vegetables each day, such as grapes, blueberries, tomatoes, broccoli, peppers, figs, olives, spinach, eggplant, beans, lentils, and chickpeas.  Eat a variety of whole-grain foods each day, such as oats, brown rice, and whole wheat bread, pasta, and couscous.  Eat fish at least 2 times a week. Try tuna, salmon, mackerel, lake trout, herring, or sardines.  Eat moderate amounts of low-fat dairy products, such as milk, cheese, or yogurt.  Eat moderate amounts of poultry and eggs.  Choose healthy (unsaturated) fats, such as nuts, olive oil, and certain nut or seed oils like canola, soybean, and flaxseed.  Limit unhealthy (saturated) fats, such as butter, palm oil, and coconut oil. And limit fats found in animal products, such as meat and dairy products made with whole milk. Try to eat red meat only a few times a month in very small amounts.  Limit sweets and desserts to only a few times a week. This includes sugar-sweetened drinks like soda. The Mediterranean diet may also include red wine with your meal--1 glass eachday for women and up to 2 glasses a day for men. Tips for eating at home   Use herbs, spices, garlic, lemon zest, and citrus juice instead of salt to add flavor to foods.  Add avocado slices to your sandwich instead of gasca.  Have fish for lunch or dinner instead of red meat. Brush the fish with olive oil, and broil or grill it.  Sprinkle your salad with seeds or nuts instead of cheese. Alicia Prude with olive or canola oil instead of butter or oils that are high in saturated fat.  Switch from 2% milk or whole milk to 1% or fat-free milk.    Dip raw vegetables in a vinaigrette dressing or hummus instead of dips made from mayonnaise or sour cream.   Have a piece of fruit for dessert instead of a piece of cake. Try baked apples, or have some dried fruit. Tips for eating out   Try broiled, grilled, baked, or poached fish instead of having it fried or breaded.  Ask your  to have your meals prepared with olive oil instead of butter.  Order dishes made with marinara sauce or sauces made from olive oil. Avoid sauces made from cream or mayonnaise.  Choose whole-grain breads, whole wheat pasta and pizza crust, brown rice, beans, and lentils.  Cut back on butter or margarine on bread. Instead, you can dip your bread in a small amount of olive oil.  Ask for a side salad or grilled vegetables instead of french fries or chips. Where can you learn more? Go to https://chpelatoniaeweb.[employer]. org and sign in to your [employer] account. Enter [phone] in the [facility] box to learn more about \"Learning About the Mediterranean Diet. \"     If you do not have an account, please click on the \"Sign Up Now\" link. Current as of: September 8, 2021               Content Version: 13.2  © 8057-7770 [employer], [employer]. Care instructions adapted under license by [facility]. If you have questions about a medical condition or this instruction, always ask your healthcare professional. Brent Varela any warranty or liability for your use of this information.

## 2022-06-15 NOTE — PROGRESS NOTES
[facility] PHYSICIANS  [facility]  [address]  [address] [address]  Dept: [phone]     Maribell Mendoza (:  1995) is a 28 y.o. female. Patient is here for evaluation of the following chief complaint(s):  Chief Complaint   Patient presents with    Weight Management     adipex         SUBJECTIVE/OBJECTIVE:  HPI  Maribell Mendoza is a 28 y.o. female patient. Patient is an established patient of Dr. Tae Raya . Patient has a known history of PCOS, and Morbid obesity. [address] PCOS/ WEIGHT  Intolerance to metformin  Patient's BMI is Body mass index is 43.08 kg/m². kg/m2. BMI is decreasing. Patient understands that this condition increases the patient's risk for chronic conditions. Wt Readings from Last 3 Encounters:   22 228 lb (103.4 kg)   22 230 lb (104.3 kg)   22 230 lb 9.6 oz (104.6 kg)   Mirena in place- Patient is an established patient of  Dr. Reed Payton. Patient reports unable to tolerate metformin due to diarrhea. .    OBESITY/ENCOUNTER FOR WEIGHT LOSS   Luz  BMI is Body mass index is 43.08 kg/m². kg/m2. Patient's BMI is decreasing. Patient's main causes of weight gain are poor eating habits, health problems and inactivity. Patient have been trying to lose weight on her own without any success. So far, she  tried own diet. 3- Strong desire present every day due to reducing carbohydrate consumption, increasing protein, eliminating pop, reducing fast food consumption, increasing exercise, and reducing sugar. Discussed low carbohydrate diet, increase physical activity, and medication prescribed: ADIPEX. Mariano Low  was started on Adipex. Patient is on Adipex Dose #3. Mariano Low  admits to appropriate appetite suppression. Patient denies any nausea, vomiting, abdominal pain, palpitations, insomnia, anxiety, headache, and chest pain.    Patient's recent BMI are as follows:  BMI Readings from Last 3 Encounters:   22 43.08 kg/m²   22 43.46 kg/m²   05/04/22 43.57 kg/m²     Patient's last weight are as follows: Wt Readings from Last 3 Encounters:   06/16/22 228 lb (103.4 kg)   05/20/22 230 lb (104.3 kg)   05/04/22 230 lb 9.6 oz (104.6 kg)      Measured  2.5 inches waist, hips,  Thighs  Feels better even if scale doesn't show much weight loss. Starting Weight 231lbs-- watching portions,   Weight goal 200lbs. Current Weight 228 lbs. Mulu Diaz also utilized diet and exercise as follows:  Diet:meal prepping-- meditation    STARTED WEIGHT WATCHERS    INTERMITTENT FASTING  ADVISED SPECIFIC EXERCISES with weights and repetitive cardio    chicken and vegies  Ground beef  Changed to ground turkey. Food plan with walking and   Off wednesdays and weekends. Grandma passed away  Picks  Walking three times a week  Home exercises-- [employer] 3-4 times a week  Exercise:dumb bells- bands - legs--  Do not time to go to the gym do at home  Did circuit training  Talked about apps  Fitness pal]]    [employer]  2 pound weights  Bought the 5 lbs weights. Planks- 30-40 seconds  Squats, lunges, jumping jacks  Stretching before and after  Post workout drinks  Body pop  Eating ground turkey, lowered bread intake. Makes chicken crust with toppings instead of pizza crust. Chicken and rice or salads or lunch and dinner. Raw veggies for snack with light dressing. Greek yogurt and fruit or avocado toast for breakfast. Talked about adding some kind of protein for breakfast or oatmeal with bruce seeds or almonds. Talked about intermittent fasting to help with weight loss. Add more intense exercises on days she doesn't do intermittent fasting. Patient informed that when prescribed a controlled substance for weight loss, the provider is required by law to see the patient for an appointment every thirty days. This is neccessary to record the weight and blood pressure and to assess patient's efforts to lose weight, and to ensure there are no contraindications or adverse effects. BP Readings from Last 3 Encounters:   06/16/22 120/80   05/20/22 116/72   05/04/22 132/76      Pulse Readings from Last 3 Encounters:   06/16/22 88   05/04/22 86   04/12/22 83     Wt Readings from Last 3 Encounters:   06/16/22 228 lb (103.4 kg)   05/20/22 230 lb (104.3 kg)   05/04/22 230 lb 9.6 oz (104.6 kg)     Exercise Tracking - Detailed 5/4/2022   Walking Duration 30   Walking Intensity Moderate   Walking Times/Week 3   Biking Duration 0   Running Duration 0   Swimming Duration 0   Cardiovascular Equipment Duration 0   Exercise Classes / Videos Duration 30   Exercise Classes / Videos Intensity Moderate   Exercise Classes / Videos Times/Week 3   Strength Training Duration 0   Other Exercise Comment -   Other Duration 0   Other Intensity -   Other Times/Week -   Total Exercise Minutes/Week 180     Exercise Tracking - Detailed 5/4/2022   Walking Duration 30   Walking Intensity Moderate   Walking Times/Week 3   Biking Duration 0   Running Duration 0   Swimming Duration 0   Cardiovascular Equipment Duration 0   Exercise Classes / Videos Duration 30   Exercise Classes / Videos Intensity Moderate   Exercise Classes / Videos Times/Week 3   Strength Training Duration 0   Other Exercise Comment -   Other Duration 0   Other Intensity -   Other Times/Week -   Total Exercise Minutes/Week 180      Plan   Carol Butler reports no use of illegal drug substances, and alcohol use. Denies pregnancy. Controlled Substance Monitoring:  Acute and Chronic Pain Monitoring:   RX Monitoring 4/12/2022   Periodic Controlled Substance Monitoring No signs of potential drug abuse or diversion identified. VITAL SIGNS:  Vitals:    06/16/22 1004   BP: 120/80   Pulse: 88   Temp: 98 °F (36.7 °C)   SpO2: 98%   Weight: 228 lb (103.4 kg)   Height: 5' 1\" (1.549 m)   Estimated body mass index is 43.08 kg/m² as calculated from the following:    Height as of this encounter: 5' 1\" (1.549 m).     Weight as of this encounter: 228 lb (103.4 kg).    Review of Systems   Constitutional: Positive for unexpected weight change (lost weight). Negative for chills and fever. HENT: Negative. Respiratory: Negative. Negative for chest tightness, shortness of breath and wheezing. Cardiovascular: Negative. Negative for chest pain and palpitations. Gastrointestinal: Negative for abdominal pain. Endocrine: Negative. Musculoskeletal: Positive for arthralgias, back pain and myalgias. Neck and back pain   Skin:        Hair loss   Neurological: Positive for numbness (bilateral arms). Negative for headaches. Psychiatric/Behavioral: Negative for sleep disturbance and suicidal ideas. The patient is nervous/anxious. Physical Exam  Vitals and nursing note reviewed. Constitutional:       Appearance: Normal appearance. She is morbidly obese. HENT:      Head: Normocephalic. Cardiovascular:      Rate and Rhythm: Normal rate and regular rhythm. Pulmonary:      Effort: Pulmonary effort is normal.      Breath sounds: Normal breath sounds. Abdominal:      General: Abdomen is protuberant. Comments: obese   Neurological:      Mental Status: She is alert and oriented to person, place, and time. Psychiatric:         Mood and Affect: Mood is anxious. Thought Content: Thought content does not include suicidal ideation. MEDICAL HISTORY      Diagnosis Date    Anxiety and depression 2016    Anxiety and depression     Depression 2018    Gastroesophageal reflux disease 2022    Heart murmur     History of  section     Obesity 2015      MEDICATIONS  Prior to Visit Medications    Medication Sig Taking? Authorizing Provider   phentermine (ADIPEX-P) 37.5 MG tablet Take 1 tablet by mouth every morning (before breakfast) for 30 days.  Yes Shalini Ramirez, APRN - CNP   Multiple Vitamins-Minerals (THERAPEUTIC MULTIVITAMIN-MINERALS) tablet Take 1 tablet by mouth daily Yes Historical Provider, MD   BIOTIN BEAUTY EXTRA STRENGTH PO Take by mouth Yes Historical Provider, MD   ibuprofen (IBU) 800 MG tablet Take 1 tablet by mouth every 8 hours as needed for Pain Yes LUCY Quintana CNP   buPROPion (WELLBUTRIN XL) 300 MG extended release tablet TAKE ONE TABLET BY MOUTH EVERY MORNING Yes LUCY Quintana CNP   famotidine (PEPCID) 20 MG tablet  Yes Historical Provider, MD   vitamin D (CHOLECALCIFEROL) 25 MCG (1000 UT) TABS tablet Take 1 tablet by mouth daily  LUCY Quintana CNP     Controlled Substance Monitoring:  Acute and Chronic Pain Monitoring:   RX Monitoring 4/12/2022   Periodic Controlled Substance Monitoring No signs of potential drug abuse or diversion identified. ASSESSMENT/PLAN:  1. PCOS (polycystic ovarian syndrome)  Stable  DISCUSSED and ADVISED TO:  Decrease carbohydrates, sugary drinks, desserts   Exercise regularly, as tolerated. Try to lose weight. - phentermine (ADIPEX-P) 37.5 MG tablet; Take 1 tablet by mouth every morning (before breakfast) for 30 days. Dispense: 30 tablet; Refill: 0    2. Morbid obesity with BMI of 40.0-44.9, adult ([facility] [address]  Improving  BMI decreasing  DISCUSSED AND ADVISED TO:  Eat a low-fat and low carbohydrates diet. Avoid fried foods especially fast food. Choose healthier options for snacks. Have 5-6 servings of fruits and vegetables per day. Cut down on eating processed food. Add 30 minutes to 1 hour aerobic exercise for 3-4 days a week. - phentermine (ADIPEX-P) 37.5 MG tablet; Take 1 tablet by mouth every morning (before breakfast) for 30 days. Dispense: 30 tablet; Refill: 0    3. Encounter for weight loss counseling  Failure to Improve  Continue adipex  exerice  Diet  Change exercise routine  - phentermine (ADIPEX-P) 37.5 MG tablet; Take 1 tablet by mouth every morning (before breakfast) for 30 days. Dispense: 30 tablet; Refill: 0    4.  Unable to lose weight  Improving  Push exercise  Push diet  Continue adipex  Continue to monitor closely    - phentermine (ADIPEX-P) 37.5 MG tablet; Take 1 tablet by mouth every morning (before breakfast) for 30 days. Dispense: 30 tablet; Refill: 0      Return in about 4 weeks (around 7/14/2022) for Pls ensure BP & Wt ready for Adipex, 30mins. This note was completed by using the assistance of a speech-recognition program. However, inadvertent computerized transcription errors may be present. Although every effort was made to ensure accuracy, no guarantees can be provided that every mistake has been identified and corrected by editing.   Electronically signed by Delroy Dubin, APRN - CNP on 2/27/22 at 7:15 PM EST     --LUCY Quintana CNP

## 2022-06-15 NOTE — TELEPHONE ENCOUNTER
Per Adrianne Elizondo NP, pt notified of IUD in proper position and no ovarian masses on pelvic US. Pt verbalized understanding.

## 2022-06-15 NOTE — TELEPHONE ENCOUNTER
----- Message from LUCY Virk CNP sent at 6/15/2022  7:54 AM EDT -----  IUD in proper position. No ovarian masses noted.   Please let patient know results

## 2022-06-16 ENCOUNTER — OFFICE VISIT (OUTPATIENT)
Dept: FAMILY MEDICINE CLINIC | Age: 27
End: 2022-06-16
Payer: COMMERCIAL

## 2022-06-16 VITALS
DIASTOLIC BLOOD PRESSURE: 80 MMHG | TEMPERATURE: 98 F | OXYGEN SATURATION: 98 % | BODY MASS INDEX: 43.05 KG/M2 | SYSTOLIC BLOOD PRESSURE: 120 MMHG | WEIGHT: 228 LBS | HEIGHT: 61 IN | HEART RATE: 88 BPM

## 2022-06-16 DIAGNOSIS — R63.8 UNABLE TO LOSE WEIGHT: ICD-10-CM

## 2022-06-16 DIAGNOSIS — E66.01 MORBID OBESITY WITH BMI OF 40.0-44.9, ADULT (HCC): ICD-10-CM

## 2022-06-16 DIAGNOSIS — E28.2 PCOS (POLYCYSTIC OVARIAN SYNDROME): ICD-10-CM

## 2022-06-16 DIAGNOSIS — Z71.3 ENCOUNTER FOR WEIGHT LOSS COUNSELING: ICD-10-CM

## 2022-06-16 PROCEDURE — G8427 DOCREV CUR MEDS BY ELIG CLIN: HCPCS | Performed by: FAMILY MEDICINE

## 2022-06-16 PROCEDURE — 99214 OFFICE O/P EST MOD 30 MIN: CPT | Performed by: FAMILY MEDICINE

## 2022-06-16 PROCEDURE — G8417 CALC BMI ABV UP PARAM F/U: HCPCS | Performed by: FAMILY MEDICINE

## 2022-06-16 PROCEDURE — 1036F TOBACCO NON-USER: CPT | Performed by: FAMILY MEDICINE

## 2022-06-16 RX ORDER — PHENTERMINE HYDROCHLORIDE 37.5 MG/1
37.5 TABLET ORAL
Qty: 30 TABLET | Refills: 0 | Status: SHIPPED | OUTPATIENT
Start: 2022-06-16 | End: 2022-07-16

## 2022-06-22 ENCOUNTER — PATIENT MESSAGE (OUTPATIENT)
Dept: OBGYN CLINIC | Age: 27
End: 2022-06-22

## 2022-06-22 DIAGNOSIS — R30.0 DYSURIA: Primary | ICD-10-CM

## 2022-06-22 NOTE — TELEPHONE ENCOUNTER
From: Lori Grady  To: Jason De  Sent: 6/22/2022 1:30 PM EDT  Subject: Possible UTI    Hi Jenni Ruelas,   I believe I may have a UTI, for the past few days I have been experiencing some pain with urination, some pelvic pain, and the feeling of not being able to fully empty my bladder. Do I need to make an appointment or can I have a lab put in to have completed?   Thank you   Miles Islas

## 2022-06-24 ENCOUNTER — HOSPITAL ENCOUNTER (OUTPATIENT)
Age: 27
Discharge: HOME | End: 2022-06-24
Payer: COMMERCIAL

## 2022-06-24 DIAGNOSIS — R30.0 DYSURIA: ICD-10-CM

## 2022-06-24 LAB
BACTERIA: NORMAL
BILIRUBIN URINE: NEGATIVE
CASTS UA: NORMAL /LPF
COLOR: YELLOW
EPITHELIAL CELLS UA: NORMAL /HPF
GLUCOSE URINE: NEGATIVE
KETONES, URINE: NEGATIVE
LEUKOCYTE ESTERASE, URINE: ABNORMAL
NITRITE, URINE: NEGATIVE
PH UA: 7 (ref 5–8)
PROTEIN UA: NEGATIVE
RBC UA: NORMAL /HPF
SPECIFIC GRAVITY UA: 1.01 (ref 1–1.03)
TURBIDITY: CLEAR
URINE HGB: NEGATIVE
UROBILINOGEN, URINE: NORMAL
WBC UA: NORMAL /HPF

## 2022-06-24 PROCEDURE — 81001 URINALYSIS AUTO W/SCOPE: CPT

## 2022-07-11 ASSESSMENT — ENCOUNTER SYMPTOMS
ABDOMINAL PAIN: 0
SHORTNESS OF BREATH: 0
CHEST TIGHTNESS: 0
BACK PAIN: 1
ROS SKIN COMMENTS: HAIR LOSS
WHEEZING: 0
RESPIRATORY NEGATIVE: 1

## 2022-07-11 NOTE — PROGRESS NOTES
[facility] PHYSICIANS  [address] [facility] PHYSICIANS [facility]  [address]  [address] [address]  Dept: [phone]     Enrico Vo (:  1995) is a 28 y.o. female. Patient is here for evaluation of the following chief complaint(s):  Chief Complaint   Patient presents with    Follow-up     adipex    Alopecia     since 2018     Medication Refill        SUBJECTIVE/OBJECTIVE:  HPI  Enrico Vo is a 28 y.o. female patient. Patient is an established patient . Patient has a known history of PCOS, depression, hearing loss, GERD and Morbid obesity. Sylwia F[place] GERD  Enrico Vo  admits to GERD symptoms of prolonged duration. Reported symptoms include heartburn. The patient denies dysphagia, vomiting. Patient is aware of some food triggers and habits that causes exacerbation of symptoms. Risk factors present for GERD include obesity and NSAID use. Current therapy pepcid. Therapy results in fair relief. PCOS/ WEIGHT  Intolerance to metformin--   Patient's BMI is Body mass index is 42.85 kg/m². kg/m2. BMI is decreasing. Patient understands that this condition increases the patient's risk for chronic conditions. Wt Readings from Last 3 Encounters:   22 226 lb 12.8 oz (102.9 kg)   22 228 lb (103.4 kg)   22 230 lb (104.3 kg)   Mirena in place- Patient is an established patient of  Dr. Mayte Munoz. Patient reports unable to tolerate metformin due to diarrhea. .    OBESITY/ENCOUNTER FOR WEIGHT LOSS   Luz  BMI is Body mass index is 42.85 kg/m². kg/m2. Patient's BMI is decreasing. Patient's main causes of weight gain are poor eating habits, health problems and inactivity. Patient have been trying to lose weight on her own without any success. So far, she  tried own diet. 3- Strong desire present every day due to reducing carbohydrate consumption, increasing protein, eliminating pop, reducing fast food consumption, increasing exercise, and reducing sugar.  Discussed low carbohydrate diet, increase physical activity, and medication prescribed: ADIPEX. Xander Arora  was started on Adipex. Patient is on Adipex Dose patient finished a course of Adipex. We will continue Wellbutrin and add naltrexone  She is encouraged to continue with low calorie diet and and exercise. Xander Arora  admits to appropriate appetite suppression. Patient denies any nausea, vomiting, abdominal pain, palpitations, insomnia, anxiety, headache, and chest pain. Patient's recent BMI are as follows:  BMI Readings from Last 3 Encounters:   07/14/22 42.85 kg/m²   06/16/22 43.08 kg/m²   05/20/22 43.46 kg/m²     Patient's last weight are as follows: Wt Readings from Last 3 Encounters:   07/14/22 226 lb 12.8 oz (102.9 kg)   06/16/22 228 lb (103.4 kg)   05/20/22 230 lb (104.3 kg)      Measured  2.5 inches waist, hips,  Thighs    Lost   More inches  4,34 inches  Feels better even if scale doesn't show much weight loss. Starting Weight 231lbs-- watching portions,   Weight goal 200lbs. Current Weight 226 lbs. Total weight loss 5 lbs  Xander Arora also utilized diet and exercise as follows:  Diet:meal prepping-- meditation    STARTED WEIGHT WATCHERS--   lazy ketogenic    fabiano crystal-- [employer]- physical  Lunges, squats push ups and jumps,  Starts slowly and speeds- 30 to 40 minuts 4-5 days a week. INTERMITTENT FASTING  ADVISED SPECIFIC EXERCISES with weights and repetitive cardio    chicken and vegies  Ground beef  Changed to ground turkey. Food plan with walking and   Off wednesdays and weekends. Grandma passed away  Picks  Walking three times a week  Home exercises-- [employer] 3-4 times a week  Exercise:dumb bells- bands - legs--  Do not time to go to the gym do at home  Did circuit training  Talked about apps  Fitness pal]]    [employer]  2 pound weights  Bought the 5 lbs weights.   Planks- 30-40 seconds  Squats, lunges, jumping jacks  Stretching before and after  Post workout drinks  Body pop  Eating ground turkey, lowered bread intake. Makes chicken crust with toppings instead of pizza crust. Chicken and rice or salads or lunch and dinner. Raw veggies for snack with light dressing. Greek yogurt and fruit or avocado toast for breakfast. Talked about adding some kind of protein for breakfast or oatmeal with bruce seeds or almonds. Talked about intermittent fasting to help with weight loss. Add more intense exercises on days she doesn't do intermittent fasting. Patient informed that when prescribed a controlled substance for weight loss, the provider is required by law to see the patient for an appointment every thirty days. This is neccessary to record the weight and blood pressure and to assess patient's efforts to lose weight, and to ensure there are no contraindications or adverse effects.    BP Readings from Last 3 Encounters:   07/14/22 120/85   06/16/22 120/80   05/20/22 116/72      Pulse Readings from Last 3 Encounters:   07/14/22 60   06/16/22 88   05/04/22 86     Wt Readings from Last 3 Encounters:   07/14/22 226 lb 12.8 oz (102.9 kg)   06/16/22 228 lb (103.4 kg)   05/20/22 230 lb (104.3 kg)     Exercise Tracking - Detailed 5/4/2022   Walking Duration 30   Walking Intensity Moderate   Walking Times/Week 3   Biking Duration 0   Running Duration 0   Swimming Duration 0   Cardiovascular Equipment Duration 0   Exercise Classes / Videos Duration 30   Exercise Classes / Videos Intensity Moderate   Exercise Classes / Videos Times/Week 3   Strength Training Duration 0   Other Exercise Comment -   Other Duration 0   Other Intensity -   Other Times/Week -   Total Exercise Minutes/Week 180     Exercise Tracking - Detailed 5/4/2022   Walking Duration 30   Walking Intensity Moderate   Walking Times/Week 3   Biking Duration 0   Running Duration 0   Swimming Duration 0   Cardiovascular Equipment Duration 0   Exercise Classes / Videos Duration 30   Exercise Classes / Videos Intensity Moderate   Exercise Classes / Videos Times/Week 3 Strength Training Duration 0   Other Exercise Comment -   Other Duration 0   Other Intensity -   Other Times/Week -   Total Exercise Minutes/Week 180      Plan   Nohemi Da Silva reports no use of illegal drug substances, and alcohol use. Denies pregnancy. Controlled Substance Monitoring:  Acute and Chronic Pain Monitoring:   RX Monitoring 4/12/2022   Periodic Controlled Substance Monitoring No signs of potential drug abuse or diversion identified. DEPRESSION AND ANXIETY  Nohemi Da Silva reported some ongoing issues with depression and anxiety. Current therapy includes wellbutrin- naltrexone, which is working well for her. she denies adverse reaction to current therapy. she also denies suicidal/homicidal ideation, plan or intent. No flowsheet data found. VITAL SIGNS:  Vitals:    07/14/22 1041   BP: 120/85   Pulse: 60   Temp: 97.3 °F (36.3 °C)   SpO2: 95%   Weight: 226 lb 12.8 oz (102.9 kg)   Height: 5' 1\" (1.549 m)   Estimated body mass index is 42.85 kg/m² as calculated from the following:    Height as of this encounter: 5' 1\" (1.549 m). Weight as of this encounter: 226 lb 12.8 oz (102.9 kg). Review of Systems   Constitutional: Positive for unexpected weight change (lost weight). Negative for activity change, chills and fever. HENT: Negative. Respiratory: Negative. Negative for chest tightness, shortness of breath and wheezing. Cardiovascular: Negative. Negative for chest pain and palpitations. Gastrointestinal: Negative for abdominal pain. Endocrine: Negative. Musculoskeletal: Positive for arthralgias, back pain and myalgias. Neck and back pain   Skin:        Hair loss   Neurological: Positive for numbness (bilateral arms). Negative for headaches. Psychiatric/Behavioral: Negative for sleep disturbance and suicidal ideas. The patient is nervous/anxious. Physical Exam  Vitals and nursing note reviewed. Constitutional:       Appearance: Normal appearance. She is morbidly obese. HENT:      Head: Normocephalic. Cardiovascular:      Rate and Rhythm: Normal rate and regular rhythm. Pulmonary:      Effort: Pulmonary effort is normal.      Breath sounds: Normal breath sounds. Abdominal:      General: Abdomen is protuberant. Comments: obese   Neurological:      Mental Status: She is alert and oriented to person, place, and time. Psychiatric:         Mood and Affect: Mood is anxious. Speech: Speech is rapid and pressured. Thought Content: Thought content does not include suicidal ideation. MEDICAL HISTORY      Diagnosis Date    Anxiety and depression 2016    Anxiety and depression     Depression 2018    Gastroesophageal reflux disease 2022    Heart murmur     History of  section     Obesity 2015      MEDICATIONS  Prior to Visit Medications    Medication Sig Taking? Authorizing Provider   buPROPion (WELLBUTRIN XL) 300 MG extended release tablet TAKE ONE TABLET BY MOUTH EVERY MORNING Yes LUCY Quintana CNP   naltrexone (DEPADE) 50 MG tablet Take 1 tablet by mouth daily Yes LUCY Quintana CNP   famotidine (PEPCID) 20 MG tablet Take 1 tablet by mouth 2 times daily Yes LUCY Quintana CNP   phentermine (ADIPEX-P) 37.5 MG tablet Take 1 tablet by mouth every morning (before breakfast) for 30 days.  Yes LUCY Quintana CNP   vitamin D (CHOLECALCIFEROL) 25 MCG (1000 UT) TABS tablet Take 1 tablet by mouth daily Yes LUCY Quintana CNP   Multiple Vitamins-Minerals (THERAPEUTIC MULTIVITAMIN-MINERALS) tablet Take 1 tablet by mouth daily Yes Historical Provider, MD   ibuprofen (IBU) 800 MG tablet Take 1 tablet by mouth every 8 hours as needed for Pain  Patient not taking: Reported on 2022  LUCY Quintana CNP     Controlled Substance Monitoring:  Acute and Chronic Pain Monitoring:   RX Monitoring 2022   Periodic Controlled Substance Monitoring No weight loss counseling  Failure to Improve  BMI decreasing  DISCUSSED AND ADVISED TO:  Eat a low-fat and low carbohydrates diet. Avoid fried foods especially fast food. Choose healthier options for snacks. Have 5-6 servings of fruits and vegetables per day. Cut down on eating processed food. Add 30 minutes to 1 hour aerobic exercise for 3-4 days a week. - buPROPion (WELLBUTRIN XL) 300 MG extended release tablet; TAKE ONE TABLET BY MOUTH EVERY MORNING  Dispense: 90 tablet; Refill: 1  - naltrexone (DEPADE) 50 MG tablet; Take 1 tablet by mouth daily  Dispense: 90 tablet; Refill: 2    6. Hair loss  Failure to Improve  Due to weight loss  Continue to monitor  Consider derm  No biotin      Return in about 4 months (around 11/14/2022) for Chronic conditions, 30mins. This note was completed by using the assistance of a speech-recognition program. However, inadvertent computerized transcription errors may be present. Although every effort was made to ensure accuracy, no guarantees can be provided that every mistake has been identified and corrected by editing.   Electronically signed by LUCY Moss CNP on 2/27/22 at 7:15 PM EST     --LUCY Quintana CNP

## 2022-07-14 ENCOUNTER — OFFICE VISIT (OUTPATIENT)
Dept: FAMILY MEDICINE CLINIC | Age: 27
End: 2022-07-14
Payer: COMMERCIAL

## 2022-07-14 VITALS
BODY MASS INDEX: 42.82 KG/M2 | DIASTOLIC BLOOD PRESSURE: 85 MMHG | SYSTOLIC BLOOD PRESSURE: 120 MMHG | HEIGHT: 61 IN | OXYGEN SATURATION: 95 % | TEMPERATURE: 97.3 F | HEART RATE: 60 BPM | WEIGHT: 226.8 LBS

## 2022-07-14 DIAGNOSIS — K21.9 GASTROESOPHAGEAL REFLUX DISEASE, UNSPECIFIED WHETHER ESOPHAGITIS PRESENT: ICD-10-CM

## 2022-07-14 DIAGNOSIS — Z71.3 ENCOUNTER FOR WEIGHT LOSS COUNSELING: ICD-10-CM

## 2022-07-14 DIAGNOSIS — L65.9 HAIR LOSS: ICD-10-CM

## 2022-07-14 DIAGNOSIS — E28.2 PCOS (POLYCYSTIC OVARIAN SYNDROME): ICD-10-CM

## 2022-07-14 DIAGNOSIS — F33.0 MILD EPISODE OF RECURRENT MAJOR DEPRESSIVE DISORDER (HCC): Primary | ICD-10-CM

## 2022-07-14 DIAGNOSIS — E66.01 MORBID OBESITY WITH BMI OF 40.0-44.9, ADULT (HCC): ICD-10-CM

## 2022-07-14 PROCEDURE — G8427 DOCREV CUR MEDS BY ELIG CLIN: HCPCS | Performed by: FAMILY MEDICINE

## 2022-07-14 PROCEDURE — G8417 CALC BMI ABV UP PARAM F/U: HCPCS | Performed by: FAMILY MEDICINE

## 2022-07-14 PROCEDURE — 99214 OFFICE O/P EST MOD 30 MIN: CPT | Performed by: FAMILY MEDICINE

## 2022-07-14 PROCEDURE — 1036F TOBACCO NON-USER: CPT | Performed by: FAMILY MEDICINE

## 2022-07-14 RX ORDER — NALTREXONE HYDROCHLORIDE 50 MG/1
50 TABLET, FILM COATED ORAL DAILY
Qty: 90 TABLET | Refills: 2 | Status: SHIPPED | OUTPATIENT
Start: 2022-07-14

## 2022-07-14 RX ORDER — FAMOTIDINE 20 MG/1
TABLET, FILM COATED ORAL
Qty: 60 TABLET | Status: CANCELLED | OUTPATIENT
Start: 2022-07-14

## 2022-07-14 RX ORDER — FAMOTIDINE 20 MG/1
20 TABLET, FILM COATED ORAL 2 TIMES DAILY
Qty: 60 TABLET | Refills: 2 | Status: SHIPPED | OUTPATIENT
Start: 2022-07-14

## 2022-07-14 RX ORDER — PHENTERMINE HYDROCHLORIDE 37.5 MG/1
37.5 TABLET ORAL
Qty: 30 TABLET | Refills: 0 | Status: CANCELLED | OUTPATIENT
Start: 2022-07-14 | End: 2022-08-13

## 2022-07-14 RX ORDER — BUPROPION HYDROCHLORIDE 300 MG/1
TABLET ORAL
Qty: 90 TABLET | Refills: 1 | Status: SHIPPED | OUTPATIENT
Start: 2022-07-14

## 2022-07-14 NOTE — PATIENT INSTRUCTIONS
New Updates for [facility] MyChart/ [employer] ([facility]) NEVAEH    Thank you for choosing US to give you the best care! [employer] ([facility]) is always trying to think of new ways to help their patients. We are asking all patients to try out the new digital registration that is now available through your [facility] account or the new NEV[employer], [employer] ([facility]). Via the nevaeh you're now able to update your personal and registration information prior to your upcoming appointment. This will save you time once you arrive at the office to check-in, not to mention your information remains safe!! Many other perks come from signing up for an account, such as:   Requesting refills   Scheduling an appointment   Completing an [address] Sending a message to the office/provider   Having access to your medication list   Paying your bill/copay prior to your appointment   Scheduling your yearly mammogram   Review your test results    If you are not familiar with [facility] or the [employer] ([facility]) NEVAEH, please ask one of us and we will be happy to answer any questions or help you set-up your account.       Your [facility] office,  [place]

## 2022-07-27 ENCOUNTER — OFFICE VISIT (OUTPATIENT)
Dept: OBGYN CLINIC | Age: 27
End: 2022-07-27
Payer: COMMERCIAL

## 2022-07-27 ENCOUNTER — HOSPITAL ENCOUNTER (OUTPATIENT)
Age: 27
Setting detail: SPECIMEN
Discharge: HOME | End: 2022-07-27

## 2022-07-27 VITALS
BODY MASS INDEX: 43.23 KG/M2 | HEIGHT: 61 IN | WEIGHT: 229 LBS | SYSTOLIC BLOOD PRESSURE: 102 MMHG | DIASTOLIC BLOOD PRESSURE: 70 MMHG

## 2022-07-27 DIAGNOSIS — N89.8 VAGINAL DISCHARGE: ICD-10-CM

## 2022-07-27 DIAGNOSIS — L65.9 HAIR LOSS: ICD-10-CM

## 2022-07-27 DIAGNOSIS — T83.32XA INTRAUTERINE CONTRACEPTIVE DEVICE THREADS LOST, INITIAL ENCOUNTER: Primary | ICD-10-CM

## 2022-07-27 PROCEDURE — G8427 DOCREV CUR MEDS BY ELIG CLIN: HCPCS | Performed by: NURSE PRACTITIONER

## 2022-07-27 PROCEDURE — 1036F TOBACCO NON-USER: CPT | Performed by: NURSE PRACTITIONER

## 2022-07-27 PROCEDURE — 99213 OFFICE O/P EST LOW 20 MIN: CPT | Performed by: NURSE PRACTITIONER

## 2022-07-27 PROCEDURE — G8417 CALC BMI ABV UP PARAM F/U: HCPCS | Performed by: NURSE PRACTITIONER

## 2022-07-27 RX ORDER — ACETAMINOPHEN AND CODEINE PHOSPHATE 120; 12 MG/5ML; MG/5ML
1 SOLUTION ORAL DAILY
Qty: 28 TABLET | Refills: 12 | Status: SHIPPED
Start: 2022-07-27 | End: 2022-07-27 | Stop reason: CLARIF

## 2022-07-27 NOTE — PROGRESS NOTES
Clarissa Rehman  2022    YOB: 1995          The patient was seen today. She is here regarding IUD removal. Desires to start on Micronor. C/o pelvic pain since IUD was placed . Her bowels are regular and she is voiding without difficulty.      HPI:  Clarissa Rehman is a 28 y.o. female  IUD strings lost, desires hormone testing d/t hair loss      OB History    Para Term  AB Living   1 1 1 0 0 1   SAB IAB Ectopic Molar Multiple Live Births   0 0 0 0 0 1      # Outcome Date GA Lbr Jas/2nd Weight Sex Delivery Anes PTL Lv   1 Term 14 38w4d  7 lb 15 oz (3.6 kg) M CS-LTranv Spinal N LUIS      Birth Comments: Meconium stained amniotic fluid, arrest of dilation, occiput posterior presentation      Apgar1: 8  Apgar5: 9       Past Medical History:   Diagnosis Date    Anxiety and depression 2016    Anxiety and depression     Depression 2018    Gastroesophageal reflux disease 2022    Heart murmur     History of  section     Obesity 2015       Past Surgical History:   Procedure Laterality Date     SECTION, LOW TRANSVERSE  3/17/14    Arrest of dilation    TOOTH EXTRACTION         Family History   Problem Relation Age of Onset    Osteoporosis Mother         osteopenia    Other Mother         degenerative disc disease    Anxiety Disorder Mother     High Blood Pressure Father     Hypertension Maternal Grandmother     Arthritis Maternal Grandmother     Lupus Maternal Grandmother     Osteoporosis Maternal Grandmother     Cancer Maternal Grandmother         bladder cancer     Breast Cancer Neg Hx     Colon Cancer Neg Hx     Diabetes Neg Hx     Eclampsia Neg Hx     Ovarian Cancer Neg Hx      Labor Neg Hx     Spont Abortions Neg Hx     Stroke Neg Hx        Social History     Socioeconomic History    Marital status: Single     Spouse name: Not on file    Number of children: Not on file    Years of education: Not on file    Highest education level: Not on file   Occupational History    Not on file   Tobacco Use    Smoking status: Never    Smokeless tobacco: Never   Substance and Sexual Activity    Alcohol use: No    Drug use: No    Sexual activity: Yes     Partners: Male   Other Topics Concern    Not on file   Social History Narrative    Not on file     Social Determinants of Health     Financial Resource Strain: Low Risk     Difficulty of Paying Living Expenses: Not hard at all   Food Insecurity: No Food Insecurity    Worried About Running Out of Food in the Last Year: Never true    Ran Out of Food in the Last Year: Never true   Transportation Needs: Not on file   Physical Activity: Insufficiently Active    Days of Exercise per Week: 3 days    Minutes of Exercise per Session: 30 min   Stress: Not on file   Social Connections: Not on file   Intimate Partner Violence: Not At Risk    Fear of Current or Ex-Partner: No    Emotionally Abused: No    Physically Abused: No    Sexually Abused: No   Housing Stability: Not on file         MEDICATIONS:  Current Outpatient Medications   Medication Sig Dispense Refill    buPROPion (WELLBUTRIN XL) 300 MG extended release tablet TAKE ONE TABLET BY MOUTH EVERY MORNING 90 tablet 1    naltrexone (DEPADE) 50 MG tablet Take 1 tablet by mouth daily 90 tablet 2    famotidine (PEPCID) 20 MG tablet Take 1 tablet by mouth 2 times daily 60 tablet 2    Multiple Vitamins-Minerals (THERAPEUTIC MULTIVITAMIN-MINERALS) tablet Take 1 tablet by mouth daily      vitamin D (CHOLECALCIFEROL) 25 MCG (1000 UT) TABS tablet Take 1 tablet by mouth daily 90 tablet 5     Current Facility-Administered Medications   Medication Dose Route Frequency Provider Last Rate Last Admin    levonorgestrel (MIRENA) IUD 52 mg 1 each  1 each IntraUTERine Once Cueva Pert, DO   1 each at 01/28/22 1154             ALLERGIES:  Allergies as of 07/27/2022    (No Known Allergies)         REVIEW OF SYSTEMS:    yes     A minimum of an eleven point review of systems was completed. Review Of Systems (11 point):  Constitutional: No fever, chills or malaise; No weight change or fatigue  Head and Eyes: No vision, Headache, Dizziness or trauma in last 12 months  ENT ROS: No hearing, Tinnitis, sinus or taste problems  Hematological and Lymphatic ROS:No Lymphoma, Von Willebrand's, Hemophillia or Bleeding History  Psych ROS: No Depression, Homicidal thoughts,suicidal thoughts, or anxiety  Breast ROS: No prior breast abnormalities or lumps  Respiratory ROS: No SOB, Pneumoniae,Cough, or Pulmonary Embolism History  Cardiovascular ROS: No Chest Pain with Exertion, Palpitations, Syncope, Edema, Arrhythmia  Gastrointestinal ROS: No Indigestion, Heartburn, Nausea, vomiting, Diarrhea, Constipation,or Bowel Changes; No Bloody Stools or melena  Genito-Urinary ROS: No Dysuria, Hematuria or Nocturia. No Urinary Incontinence or Vaginal Discharge  Musculoskeletal ROS: No Arthralgia, Arthritis,Gout,Osteoporosis or Rheumatism  Neurological ROS: No CVA, Migraines, Epilepsy, Seizure Hx, or Limb Weakness  Dermatological ROS: No Rash, Itching, Hives, Mole Changes or Cancer          Blood pressure 102/70, height 5' 1\" (1.549 m), weight 229 lb (103.9 kg), not currently breastfeeding. Chaperone for Intimate Exam  Chaperone was offered and accepted as part of the rooming process. Chaperone: CHANDAN GARCIA         Abdomen: Soft non-tender; good bowel sounds. No guarding, rebound or rigidity. No CVA tenderness bilaterally. Extremities: No calf tenderness, DTR 2/4, and No edema bilaterally    Pelvic: Vulva and vagina appear normal. Bimanual exam reveals normal uterus and adnexa. IUD strings NOT visualized     Diagnostics:  No results found.     Lab Results:  Results for orders placed or performed during the hospital encounter of 06/24/22   Urinalysis with Reflex to Culture   Result Value Ref Range    Color, UA Yellow Yellow    Turbidity UA Clear Clear    Glucose, Ur NEGATIVE NEGATIVE    Bilirubin Urine NEGATIVE NEGATIVE    Ketones, Urine NEGATIVE NEGATIVE    Specific Gravity, UA 1.012 1.000 - 1.030    Urine Hgb NEGATIVE NEGATIVE    pH, UA 7.0 5.0 - 8.0    Protein, UA NEGATIVE NEGATIVE    Urobilinogen, Urine Normal Normal    Nitrite, Urine NEGATIVE NEGATIVE    Leukocyte Esterase, Urine TRACE (A) NEGATIVE   Microscopic Urinalysis   Result Value Ref Range    WBC, UA 0 TO 2 /HPF    RBC, UA 0 TO 2 /HPF    Casts UA 0 TO 2 /LPF    Epithelial Cells UA 0 TO 2 /HPF    Bacteria, UA None None         Assessment:   Diagnosis Orders   1. Intrauterine contraceptive device threads lost, initial encounter  [facility] NON OB TRANSVAGINAL      2. Vaginal discharge  C.trachomatis N.gonorrhoeae DNA    Vaginitis DNA Probe      3. Hair loss  Estradiol    Progesterone    TSH with Reflex        Patient Active Problem List    Diagnosis Date Noted    Heart murmur      Priority: Low     Refer to cardiologist       Vitamin D deficiency 2022    Chronic midline thoracic back pain 2022    Neck pain, chronic 2022    Numbness and tingling in both hands 2022    IUD (intrauterine device) in place 2022    Gastroesophageal reflux disease 2022    Mild episode of recurrent major depressive disorder ([facility] [address] 2021    Acne vulgaris 2020    Thinning hair 2020    PCOS (polycystic ovarian syndrome) 2020    Depression 2018    Mood swing 2018    Migraine without aura and without status migrainosus, not intractable 2016    Class 3 severe obesity due to excess calories with serious comorbidity and body mass index (BMI) of 40.0 to 44.9 in adult [facility]) 2015    History of  section            PLAN:  No follow-ups on file. Pelvic u/s ordered  Lab slip given  Repeat Annual every 1 year  Cervical Cytology Evaluation begins at 24years old. If Negative Cytology, Follow-up screening per current guidelines. Return to the office in 2 weeks.   Counseled on preventative health

## 2022-07-28 LAB
C TRACH DNA GENITAL QL NAA+PROBE: NEGATIVE
CANDIDA SPECIES, DNA PROBE: NEGATIVE
GARDNERELLA VAGINALIS, DNA PROBE: NEGATIVE
N. GONORRHOEAE DNA: NEGATIVE
SOURCE: NORMAL
SPECIMEN DESCRIPTION: NORMAL
TRICHOMONAS VAGINALIS DNA: NEGATIVE

## 2022-08-02 ENCOUNTER — OFFICE VISIT (OUTPATIENT)
Dept: OBGYN CLINIC | Age: 27
End: 2022-08-02
Payer: COMMERCIAL

## 2022-08-02 DIAGNOSIS — T83.32XA INTRAUTERINE CONTRACEPTIVE DEVICE THREADS LOST, INITIAL ENCOUNTER: ICD-10-CM

## 2022-08-02 PROCEDURE — 76856 US EXAM PELVIC COMPLETE: CPT | Performed by: OBSTETRICS & GYNECOLOGY

## 2022-08-04 ENCOUNTER — TELEPHONE (OUTPATIENT)
Dept: OBGYN CLINIC | Age: 27
End: 2022-08-04

## 2022-08-04 DIAGNOSIS — N83.201 CYST OF RIGHT OVARY: Primary | ICD-10-CM

## 2022-08-04 NOTE — TELEPHONE ENCOUNTER
Per Electa Boxer pt notified of Right ovarian simple cyst noted. IUD in proper position./torsion restrictions given. Repeat pelvic ultrasound in 8-12 weeks for resolution of ovarian cyst ordered and scheduled.    Recommend GYN follow up if cyst persists

## 2022-08-04 NOTE — TELEPHONE ENCOUNTER
----- Message from LUCY Borrero CNP sent at 8/3/2022  8:00 AM EDT -----  Right ovarian simple cyst noted. IUD in proper position. Instruct on torsion restrictions. Repeat pelvic ultrasound in 8-12 weeks for resolution of ovarian cyst.  Recommend GYN follow up if cyst persists.

## 2022-08-17 ENCOUNTER — HOSPITAL ENCOUNTER (OUTPATIENT)
Dept: NEUROLOGY | Age: 27
Discharge: HOME | End: 2022-08-17
Payer: COMMERCIAL

## 2022-08-17 PROCEDURE — 95912 NRV CNDJ TEST 11-12 STUDIES: CPT | Performed by: PHYSICAL MEDICINE & REHABILITATION

## 2022-08-17 PROCEDURE — 95886 MUSC TEST DONE W/N TEST COMP: CPT | Performed by: PHYSICAL MEDICINE & REHABILITATION

## 2022-08-18 NOTE — RESULT ENCOUNTER NOTE
Please let her know that her EMG testing for carpal tunnel syndrome came back and it is very mild. Continue wearing splint at night. No need for referral or surgery.

## 2022-08-29 ENCOUNTER — OFFICE VISIT (OUTPATIENT)
Dept: OBGYN CLINIC | Age: 27
End: 2022-08-29
Payer: COMMERCIAL

## 2022-08-29 ENCOUNTER — HOSPITAL ENCOUNTER (OUTPATIENT)
Age: 27
Setting detail: SPECIMEN
Discharge: HOME | End: 2022-08-29

## 2022-08-29 VITALS
WEIGHT: 231 LBS | BODY MASS INDEX: 43.61 KG/M2 | DIASTOLIC BLOOD PRESSURE: 64 MMHG | HEIGHT: 61 IN | SYSTOLIC BLOOD PRESSURE: 108 MMHG

## 2022-08-29 DIAGNOSIS — Z30.432 ENCOUNTER FOR IUD REMOVAL: Primary | ICD-10-CM

## 2022-08-29 DIAGNOSIS — R10.2 PELVIC PAIN: ICD-10-CM

## 2022-08-29 DIAGNOSIS — R30.0 DYSURIA: ICD-10-CM

## 2022-08-29 DIAGNOSIS — L65.9 HAIR LOSS: ICD-10-CM

## 2022-08-29 PROCEDURE — 58301 REMOVE INTRAUTERINE DEVICE: CPT | Performed by: OBSTETRICS & GYNECOLOGY

## 2022-08-29 RX ORDER — NORETHINDRONE 0.35 MG/1
TABLET ORAL
COMMUNITY
Start: 2022-08-26

## 2022-08-29 NOTE — PROGRESS NOTES
Nohemi Nipper  2022  No LMP recorded. Patient has had an implant. HPI:The patient is requesting that her IUD be removed as she is NOT planning on becoming pregnant. The patient was counseled on the procedure. Risks, benefits and alternatives were reviewed. A consent was reviewed and obtained. The patient denies that she has been completing her string checks as directed. She has no other chief complaint today. Pt is c/o hormonal issues after IUD inserted including mood changes/ Hair loss  All other forms of birth control both male and female reversible and non were reviewed with the patient. She is not a smoker. The patient denies any family member or herself as having a blood clot in their leg, lung, or brain. She denies that any member of her family has had a sudden cardiac death under the age of 47 yo.     Past Medical History:   Diagnosis Date    Anxiety and depression 2016    Anxiety and depression     Depression 2018    Gastroesophageal reflux disease 2022    Heart murmur     History of  section     Obesity 2015       Past Surgical History:   Procedure Laterality Date     SECTION, LOW TRANSVERSE  3/17/14    Arrest of dilation    TOOTH EXTRACTION         Social History     Tobacco Use    Smoking status: Never    Smokeless tobacco: Never   Substance Use Topics    Alcohol use: No    Drug use: No           MEDICATIONS:  Current Outpatient Medications   Medication Sig Dispense Refill    JENCYCLA 0.35 MG tablet       buPROPion (WELLBUTRIN XL) 300 MG extended release tablet TAKE ONE TABLET BY MOUTH EVERY MORNING 90 tablet 1    naltrexone (DEPADE) 50 MG tablet Take 1 tablet by mouth daily 90 tablet 2    famotidine (PEPCID) 20 MG tablet Take 1 tablet by mouth 2 times daily 60 tablet 2    Multiple Vitamins-Minerals (THERAPEUTIC MULTIVITAMIN-MINERALS) tablet Take 1 tablet by mouth daily      vitamin D (CHOLECALCIFEROL) 25 MCG (1000 UT) TABS tablet Take 1 tablet by mouth daily 90 tablet 5     Current Facility-Administered Medications   Medication Dose Route Frequency Provider Last Rate Last Admin    levonorgestrel (MIRENA) IUD 52 mg 1 each  1 each IntraUTERine Once Simba Muñoz, DO   1 each at 01/28/22 1154         ALLERGIES:  Allergies as of 08/29/2022    (No Known Allergies)         Blood pressure 108/64, height 5' 1\" (1.549 m), weight 231 lb (104.8 kg), not currently breastfeeding. >    Chaperone for Intimate Exam  Chaperone was offered and accepted as part of the rooming process. Chaperone: Vero LI time out was called by the Chaperone listed above while ALL participants were quiet and attentive. The procedure to be completed was confirmed, with the appropriate laterality demarcated prior, if appropriate, as well as the patients name and a unique identifier, her date of birth. All questions were answered to her satisfaction. The consent was signed prior to the time out and all the risks were discussed in detail. Urine pregnancy test: negative    The patient was positioned comfortably on the exam table. After a bi-manual exam; the uterus was found to be  anteverted. There was no cervical motion tenderness or adnexal masses. The bladder was smooth, non-tender and without palpable masses. A sterile speculum was placed without incident and the string was identified at the cervical portio. The site was then cleansed with betadine and the string was grasped with a ring forcep and the IUD was removed without incident. The IUD was sent to pathology in separate specimen container for macro only. Post procedure restrictions were reviewed and given to the patient. She was instructed to use barrier protection for sexually transmitted disease prevention. She has elected to use OCs as an adjunct to the barrier protection and she was counseled on its use.  She is aware that her new hormonal based birth control takes a minimum of thirty days before it becomes effective and any antibiotic use decreases its efficacy. Alternate barrier contraception would be warranted for a thirty day window, beginning from the onset of the antibiotic dosing schedule, even while continuing on her hormonal based contraception. SEE CONSENT FORM FOR SMOKING PATIENTS: No      The patient, Rosalina Xavier is a 28 y.o. female, was seen with a total time spent of 20 minutes for the visit on this date of service by the E/M provider. The time component had both face to face and non face to face time spent in determining the total time component. Counseling and education regarding her diagnosis listed below and her options regarding those diagnoses were also included in determining her time component. Diagnosis Orders   1. Encounter for IUD removal  [id number] - CT REMOVE INTRAUTERINE DEVICE    Specimen to Pathology Outpatient      2. Hair loss  11481 - CT REMOVE INTRAUTERINE DEVICE    Specimen to Pathology Outpatient      3. Pelvic pain  47810 - CT REMOVE INTRAUTERINE DEVICE    Specimen to Pathology Outpatient      4. Dysuria             The patient had her preventative health maintenance recommendations and follow-up reviewed with her at the completion of her visit. ASSESSMENT:   Diagnosis Orders   1. Encounter for IUD removal  [phone] - CT REMOVE INTRAUTERINE DEVICE    Specimen to Pathology Outpatient      2. Hair loss  45818 - CT REMOVE INTRAUTERINE DEVICE    Specimen to Pathology Outpatient      3. Pelvic pain  06140 - CT REMOVE INTRAUTERINE DEVICE    Specimen to Pathology Outpatient      4. Dysuria          IUD Removal  Family Planning   reports that she has never smoked.  She has never used smokeless tobacco.  Patient Active Problem List    Diagnosis Date Noted    Heart murmur      Priority: Low     Refer to cardiologist       Vitamin D deficiency 04/12/2022    Chronic midline thoracic back pain 03/04/2022    Neck pain, chronic 03/04/2022    Numbness and tingling in both hands 03/04/2022    IUD (intrauterine device) in place 2022    Gastroesophageal reflux disease 2022    Mild episode of recurrent major depressive disorder ([facility] [address] 2021    Acne vulgaris 2020    Thinning hair 2020    PCOS (polycystic ovarian syndrome) 2020    Depression 2018    Mood swing 2018    Migraine without aura and without status migrainosus, not intractable 2016    Class 3 severe obesity due to excess calories with serious comorbidity and body mass index (BMI) of 40.0 to 44.9 in adult [facility]) 2015    History of  section            PLAN:  Return in about 3 months (around 2022) for Follow up labs/diagnostics. Family Planning Counseling Completed  RX OCs  Barrier Recommendations   STD counseling completed  Restrictions reviewed  HRT consent signed    Annual health follow-up  reviewed. Pt to make appt. Counseling Hormonal Based Birth Control:      The patient was seen and counseled on all forms of birth control both male and female  reversible and non. She is aware that hormonal based birth control may increase her risk of developing a blood clot which may increase her morbidity and or mortality. She was counseled on alternate non hormonal based contraception options. We discussed that smoking and any hormonal based contraception may increase the patients risks of developing these life threatening blood clots. All patients are encouraged to stop smoking at the time of contraceptive counseling. Cessation programs were reviewed. The patient was instructed to use barrier contraception for sexually transmitted disease prevention. The patient was also informed of antibiotics decreasing contraceptive efficacy and the need for barrier contraception from the onset of her antibiotic dosing and through a minimum of thirty days from antibiotic cessation.     The life threatening side effect profile was reviewed in detail this includes but is not limited to shortness of breath, chest pain, severe or persistent headaches, or calf pain. If any of these occur the patient has been instructed to stop using her hormonal based contraception, notify the office, and go to the emergency department or call 911. The patient denied any personal history of blood clots in her leg, lung, or heart and denied any family history of stroke, TIA, sudden cardiac death < 36 y.o.,pulmonary embolism, or deep venous thrombosis.      Orders Placed This Encounter   Procedures    Specimen to Pathology Outpatient     Standing Status:   Future     Standing Expiration Date:   8/29/2023     Order Specific Question:   PREVIOUS BIOPSY     Answer:   No     Order Specific Question:   PREOP DIAGNOSIS     Answer:   IUD-gross exam only     Order Specific Question:   FROZEN SECTION - NO OR YES/SPECIMEN     Answer:   No    18747 - [place] REMOVE INTRAUTERINE DEVICE

## 2022-08-31 LAB — SURGICAL PATHOLOGY REPORT: NORMAL

## 2022-10-04 ENCOUNTER — OFFICE VISIT (OUTPATIENT)
Dept: OBGYN CLINIC | Age: 27
End: 2022-10-04
Payer: COMMERCIAL

## 2022-10-04 DIAGNOSIS — N83.201 CYST OF RIGHT OVARY: ICD-10-CM

## 2022-10-04 PROCEDURE — 76856 US EXAM PELVIC COMPLETE: CPT | Performed by: OBSTETRICS & GYNECOLOGY

## 2022-11-05 ENCOUNTER — HOSPITAL ENCOUNTER (OUTPATIENT)
Age: 27
Discharge: HOME | End: 2022-11-05
Payer: COMMERCIAL

## 2022-11-05 DIAGNOSIS — L65.9 HAIR LOSS: ICD-10-CM

## 2022-11-05 LAB
ESTRADIOL LEVEL: 218 PG/ML (ref 27–314)
PROGESTERONE LEVEL: 0.62 NG/ML
TSH SERPL DL<=0.05 MIU/L-ACNC: 1.72 UIU/ML (ref 0.3–5)

## 2022-11-05 PROCEDURE — 36415 COLL VENOUS BLD VENIPUNCTURE: CPT

## 2022-11-05 PROCEDURE — 82670 ASSAY OF TOTAL ESTRADIOL: CPT

## 2022-11-05 PROCEDURE — 84144 ASSAY OF PROGESTERONE: CPT

## 2022-11-05 PROCEDURE — 84443 ASSAY THYROID STIM HORMONE: CPT

## 2022-12-05 ASSESSMENT — ENCOUNTER SYMPTOMS
WHEEZING: 0
CHEST TIGHTNESS: 0
BACK PAIN: 1
RESPIRATORY NEGATIVE: 1
ABDOMINAL PAIN: 0
SHORTNESS OF BREATH: 0

## 2022-12-06 NOTE — PROGRESS NOTES
[facility] PHYSICIANS  [facility]  [address]  [address] [place] [address]  Dept: [phone]     Bela Howe (:  1995) is a 28 y.o. female. Patient is here for evaluation of the following chief complaint(s):  Chief Complaint   Patient presents with    Medication Check      SUBJECTIVE/OBJECTIVE:  HPI  Bela Howe is a 28 y.o. female patient. Patient is an established patient. Patient has a known history of migraine, mdd, neck pain, thoracic pain, numbness on both arms, GERD, PCOS, Mirena, and Morbid obesity. .    New eye     MIRENA  Was taken out- due to pain- no contraception. MIGRAINE HEADACHE  Bela Howe is a 28 y.o. female patient. Not had any headaches since getting new glasses. THORACIC PAIN  Bela Howe is a 28 y.o. female patient. Reports not bad, just tensions and stress. It only bothers her When shes not stretching. GERD- stable  Bela Howe  admits to GERD symptoms of prolonged duration. Reported symptoms include heartburn. The patient denies dysphagia, vomiting. Patient is aware of some food triggers and habits that causes exacerbation of symptoms. Risk factors present for GERD include obesity and NSAID use. Current therapy pepcid. Therapy results in fair relief. PCOS/ WEIGHT- - gained 2 lbs- finished adipex in . Wants to do them again in January. After the holidays. Struggles with food  Works out twice a week 20-30 minutes exercising. Walk the parking lot. Medicine ball. Intolerance to metformin  Patient's BMI is Body mass index is 44.18 kg/m². kg/m2. BMI is increasing. Patient understands that this condition increases the patient's risk for chronic conditions. Wt Readings from Last 3 Encounters:   22 233 lb 12.8 oz (106.1 kg)   22 231 lb (104.8 kg)   22 229 lb (103.9 kg)     PCOS- intolerant to Metformin  Mirena in place- Patient is an established patient of  Dr. Marlene Baker.  Patient reports unable to tolerate metformin due to diarrhea. .  Lab Results   Component Value Date/Time    LABA1C 5.2 03/10/2022 09:25 AM    LABA1C 5.0 08/31/2021 09:19 AM      VITAMIN D  Patient has a known Vitamin D Deficiency. she Usually covered with 12 weeks weekly dose then daily. she is due to get levels check. We continue to discuss ways to manage this condition. We also discussed ways to improve the levels. Such as learning food groups that are high in Vitamin D. We also discuss that sun exposure is beneficial however, too much sun and sun damage can potentially result in skin cancer. Lab Results   Component Value Date/Time    VITD25 36.1 03/10/2022 09:25 AM      WEIGHT  Patient's BMI is Body mass index is 44.18 kg/m². kg/m2. BMI is increasing. Patient understands that this condition increases the patient's risk for chronic conditions. Wt Readings from Last 3 Encounters:   12/08/22 233 lb 12.8 oz (106.1 kg)   08/29/22 231 lb (104.8 kg)   07/27/22 229 lb (103.9 kg)     VITAL SIGNS:  Vitals:    12/08/22 0943   BP: 133/80   Site: Right Lower Arm   Position: Sitting   Cuff Size: Small Adult   Pulse: 80   SpO2: 97%   Weight: 233 lb 12.8 oz (106.1 kg)   Height: 5' 1\" (1.549 m)     Estimated body mass index is 44.18 kg/m² as calculated from the following:    Height as of this encounter: 5' 1\" (1.549 m). Weight as of this encounter: 233 lb 12.8 oz (106.1 kg). Review of Systems   Constitutional:  Positive for unexpected weight change. Negative for activity change, chills and fever. HENT: Negative. Eyes:  Negative for visual disturbance. Respiratory: Negative. Negative for chest tightness, shortness of breath and wheezing. Cardiovascular: Negative. Negative for chest pain and palpitations. Gastrointestinal:  Negative for abdominal pain. Endocrine: Negative. Musculoskeletal:  Positive for arthralgias and back pain. Negative for myalgias.         Neck and back pain   Skin:         Acne- chin   Neurological: Positive for numbness (bilateral arms) and headaches (better). Negative for weakness. Psychiatric/Behavioral:  Negative for sleep disturbance and suicidal ideas. The patient is nervous/anxious. Physical Exam  Vitals and nursing note reviewed. Constitutional:       Appearance: Normal appearance. She is morbidly obese. HENT:      Head: Normocephalic. Nose: Nose normal.   Cardiovascular:      Rate and Rhythm: Normal rate and regular rhythm. Pulmonary:      Effort: Pulmonary effort is normal.      Breath sounds: Normal breath sounds. Abdominal:      General: Abdomen is protuberant. Comments: obese   Skin:     General: Skin is warm and dry. Findings: Acne present. Comments: Closed comedones- chin area   Neurological:      Mental Status: She is alert and oriented to person, place, and time. Psychiatric:         Mood and Affect: Mood is anxious. Speech: Speech is not rapid and pressured. Thought Content: Thought content does not include suicidal ideation. MEDICAL HISTORY      Diagnosis Date    Anxiety and depression 2016    Anxiety and depression     Depression 2018    Gastroesophageal reflux disease 2022    Heart murmur     History of  section     Obesity 2015      MEDICATIONS  Prior to Visit Medications    Medication Sig Taking?  Authorizing Provider   buPROPion (WELLBUTRIN XL) 300 MG extended release tablet TAKE ONE TABLET BY MOUTH EVERY MORNING Yes LUCY Quintana CNP   famotidine (PEPCID) 20 MG tablet Take 1 tablet by mouth 2 times daily Yes LUCY Quintana CNP   vitamin D (CHOLECALCIFEROL) 25 MCG (1000 UT) TABS tablet Take 1 tablet by mouth daily Yes LUCY Quintana CNP   Adapalene 0.1 % LOTN Apply 1 actuation topically at bedtime Yes LUCY Quintana CNP   JENCYCLA 0.35 MG tablet  Yes Historical Provider, MD   Multiple Vitamins-Minerals (THERAPEUTIC MULTIVITAMIN-MINERALS) tablet Take 1 tablet by mouth daily Yes Historical Provider, MD     Controlled Substance Monitoring:  Acute and Chronic Pain Monitoring:   RX Monitoring 12/5/2022   Periodic Controlled Substance Monitoring No signs of potential drug abuse or diversion identified. ASSESSMENT/PLAN:  1. Migraine without aura and without status migrainosus, not intractable  Stable  Take medications as discussed. DISCUSSED AND ADVISED TO:  Find ways to manage stress. Rest well, cut down on caffeine, and alcohol intake  Report for increasing incidences and worsening symptoms. 2. Chronic midline thoracic back pain  Stable  Continue current therapy. DISCUSSED AND ADVISED TO:  Use heat packs 15 to 20 mins every 2-3 hours. Do some back stretches as tolerated. Refer to hand out for instructions. Call for worsening, numbness, weakness. 3. Acne vulgaris  Worsening  ADVISED TO:  Wash face at least twice a day  Use prescription cleansers  Report for worsening redness, swelling, and increasing numbers of pimples. - Adapalene 0.1 % LOTN; Apply 1 actuation topically at bedtime  Dispense: 1 each; Refill: 1    4. Gastroesophageal reflux disease, unspecified whether esophagitis present  Stable  Continue therapy  DISCUSSED AND ADVISED TO:  Avoid food triggers. Stop eating large meals close to bedtime  Don't eat meals too close to bedtime  Avoid ASA, NSAID's, caffeine, peppermints, alcohol and tobacco.  Report for worsening symptoms. - famotidine (PEPCID) 20 MG tablet; Take 1 tablet by mouth 2 times daily  Dispense: 180 tablet; Refill: 1    5. Mild episode of recurrent major depressive disorder (HCC)  Stable  Continue current therapy. DISCUSSED and ADVISED TO:  Not stopping medication suddenly. See the specialist as discussed. Report for feelings of SI, HI, and hallucinations. Go to the ER for increasing urge to hurt yourself.     - buPROPion (WELLBUTRIN XL) 300 MG extended release tablet; TAKE ONE TABLET BY MOUTH EVERY MORNING  Dispense: 90 tablet; Refill: 1    6. PCOS (polycystic ovarian syndrome)  Stable  DISCUSSED and ADVISED TO:  Decrease carbohydrates, sugary drinks, desserts   Exercise regularly, as tolerated. Try to lose weight. - buPROPion (WELLBUTRIN XL) 300 MG extended release tablet; TAKE ONE TABLET BY MOUTH EVERY MORNING  Dispense: 90 tablet; Refill: 1    7. Vitamin D deficiency  Stable  Continue Vitamin D supplementation  DISCUSSED AND ADVISED TO:  Foods that contain a lot of vitamin D includes [place], tuna, and mackerel. Cheese, egg yolks, and beef liver have small amounts of vit D.  Milk, soy drinks, orange juice, yogurt, margarine, and some kinds of cereal have vitamin D added to them. Continue to use sunblock when out in the sun to prevent skin cancer.    - vitamin D (CHOLECALCIFEROL) 25 MCG (1000 UT) TABS tablet; Take 1 tablet by mouth daily  Dispense: 90 tablet; Refill: 1    8. Class 3 severe obesity due to excess calories with serious comorbidity and body mass index (BMI) of 40.0 to 44.9 in adult (HCC)  Stable  BMI decreasing  DISCUSSED AND ADVISED TO:  Eat a low-fat and low carbohydrates diet. Avoid fried foods especially fast food. Choose healthier options for snacks. Have 5-6 servings of fruits and vegetables per day. Cut down on eating processed food. Add 30 minutes to 1 hour aerobic exercise for 3-4 days a week. On this date 12/8/2022 I have spent 35 minutes reviewing previous notes, test results and face to face with the patient discussing the diagnosis and importance of compliance with the treatment plan as well as documenting on the day of the visit. Return in about 4 weeks (around 1/5/2023) for Chronic conditions, Appt w/ Dr. Anitha Pham ([facility] for virtual) . This note was completed by using the assistance of a speech-recognition program. However, inadvertent computerized transcription errors may be present.  Although every effort was made to ensure accuracy, no guarantees can be provided that every mistake has been identified and corrected by editing.   Electronically signed by LUCY Silvestre CNP on 2/27/22 at 7:15 PM EST     --LUCY Quintana - CNP

## 2022-12-08 ENCOUNTER — OFFICE VISIT (OUTPATIENT)
Dept: FAMILY MEDICINE CLINIC | Age: 27
End: 2022-12-08
Payer: COMMERCIAL

## 2022-12-08 VITALS
OXYGEN SATURATION: 97 % | HEIGHT: 61 IN | DIASTOLIC BLOOD PRESSURE: 80 MMHG | SYSTOLIC BLOOD PRESSURE: 133 MMHG | BODY MASS INDEX: 44.14 KG/M2 | WEIGHT: 233.8 LBS | HEART RATE: 80 BPM

## 2022-12-08 DIAGNOSIS — E66.01 CLASS 3 SEVERE OBESITY DUE TO EXCESS CALORIES WITH SERIOUS COMORBIDITY AND BODY MASS INDEX (BMI) OF 40.0 TO 44.9 IN ADULT (HCC): ICD-10-CM

## 2022-12-08 DIAGNOSIS — E55.9 VITAMIN D DEFICIENCY: ICD-10-CM

## 2022-12-08 DIAGNOSIS — G89.29 CHRONIC MIDLINE THORACIC BACK PAIN: ICD-10-CM

## 2022-12-08 DIAGNOSIS — F33.0 MILD EPISODE OF RECURRENT MAJOR DEPRESSIVE DISORDER (HCC): ICD-10-CM

## 2022-12-08 DIAGNOSIS — M54.6 CHRONIC MIDLINE THORACIC BACK PAIN: ICD-10-CM

## 2022-12-08 DIAGNOSIS — G43.009 MIGRAINE WITHOUT AURA AND WITHOUT STATUS MIGRAINOSUS, NOT INTRACTABLE: Primary | ICD-10-CM

## 2022-12-08 DIAGNOSIS — K21.9 GASTROESOPHAGEAL REFLUX DISEASE, UNSPECIFIED WHETHER ESOPHAGITIS PRESENT: ICD-10-CM

## 2022-12-08 DIAGNOSIS — L70.0 ACNE VULGARIS: ICD-10-CM

## 2022-12-08 DIAGNOSIS — E28.2 PCOS (POLYCYSTIC OVARIAN SYNDROME): ICD-10-CM

## 2022-12-08 PROCEDURE — 1036F TOBACCO NON-USER: CPT | Performed by: FAMILY MEDICINE

## 2022-12-08 PROCEDURE — G8427 DOCREV CUR MEDS BY ELIG CLIN: HCPCS | Performed by: FAMILY MEDICINE

## 2022-12-08 PROCEDURE — G8417 CALC BMI ABV UP PARAM F/U: HCPCS | Performed by: FAMILY MEDICINE

## 2022-12-08 PROCEDURE — 99214 OFFICE O/P EST MOD 30 MIN: CPT | Performed by: FAMILY MEDICINE

## 2022-12-08 PROCEDURE — G8484 FLU IMMUNIZE NO ADMIN: HCPCS | Performed by: FAMILY MEDICINE

## 2022-12-08 RX ORDER — FAMOTIDINE 20 MG/1
20 TABLET, FILM COATED ORAL 2 TIMES DAILY
Qty: 180 TABLET | Refills: 1 | Status: SHIPPED | OUTPATIENT
Start: 2022-12-08

## 2022-12-08 RX ORDER — ADAPALENE 0.1 G/100ML
1 LOTION TOPICAL NIGHTLY
Qty: 1 EACH | Refills: 1 | Status: SHIPPED | OUTPATIENT
Start: 2022-12-08

## 2022-12-08 RX ORDER — BUPROPION HYDROCHLORIDE 300 MG/1
TABLET ORAL
Qty: 90 TABLET | Refills: 1 | Status: SHIPPED | OUTPATIENT
Start: 2022-12-08

## 2022-12-08 SDOH — ECONOMIC STABILITY: FOOD INSECURITY: WITHIN THE PAST 12 MONTHS, YOU WORRIED THAT YOUR FOOD WOULD RUN OUT BEFORE YOU GOT MONEY TO BUY MORE.: NEVER TRUE

## 2022-12-08 SDOH — ECONOMIC STABILITY: FOOD INSECURITY: WITHIN THE PAST 12 MONTHS, THE FOOD YOU BOUGHT JUST DIDN'T LAST AND YOU DIDN'T HAVE MONEY TO GET MORE.: NEVER TRUE

## 2022-12-08 ASSESSMENT — SOCIAL DETERMINANTS OF HEALTH (SDOH): HOW HARD IS IT FOR YOU TO PAY FOR THE VERY BASICS LIKE FOOD, HOUSING, MEDICAL CARE, AND HEATING?: NOT HARD AT ALL

## 2022-12-08 NOTE — PATIENT INSTRUCTIONS
New Updates for Trisha Fernandez/ [employer] ([facility]) NEVAEH    Thank you for choosing US to give you the best care! [employer] ([facility]) is always trying to think of new ways to help their patients. We are asking all patients to try out the new digital registration that is now available through your [facility] account or the new NEVAEH, [employer] ([facility]). Via the nevaeh you're now able to update your personal and registration information prior to your upcoming appointment. This will save you time once you arrive at the office to check-in, not to mention your information remains safe!! Many other perks come from signing up for an account, such as:  Requesting refills  Scheduling an appointment  Completing an E-Visit  Sending a message to the office/provider  Having access to your medication list  Paying your bill/copay prior to your appointment  Scheduling your yearly mammogram  Review your test results    If you are not familiar with [facility] or the [employer] ([facility]) NEVAEH, please ask one of us and we will be happy to answer any questions or help you set-up your account.       Your [facility] office,  [place]

## 2023-01-05 ENCOUNTER — TELEPHONE (OUTPATIENT)
Dept: FAMILY MEDICINE CLINIC | Age: 28
End: 2023-01-05

## 2023-01-05 NOTE — TELEPHONE ENCOUNTER
NO show    Ariel Profit    Mobile Phone  Send Link Via Text  No text has been sent  Last text sent01/05/2023 10:13 AM  To:[phone]  Email  Send Link Via Email  No email has been sent  Last email sent01/05/2023 10:13 AM  [email or web address]

## 2023-03-22 ENCOUNTER — OFFICE VISIT (OUTPATIENT)
Dept: OBGYN CLINIC | Age: 28
End: 2023-03-22

## 2023-03-22 ENCOUNTER — HOSPITAL ENCOUNTER (OUTPATIENT)
Age: 28
Setting detail: SPECIMEN
Discharge: HOME | End: 2023-03-22

## 2023-03-22 VITALS
HEIGHT: 61 IN | SYSTOLIC BLOOD PRESSURE: 128 MMHG | WEIGHT: 238 LBS | BODY MASS INDEX: 44.93 KG/M2 | DIASTOLIC BLOOD PRESSURE: 72 MMHG

## 2023-03-22 DIAGNOSIS — Z11.51 SCREENING FOR HUMAN PAPILLOMAVIRUS (HPV): ICD-10-CM

## 2023-03-22 DIAGNOSIS — L65.9 THINNING HAIR: ICD-10-CM

## 2023-03-22 DIAGNOSIS — Z01.419 VISIT FOR GYNECOLOGIC EXAMINATION: Primary | ICD-10-CM

## 2023-03-22 DIAGNOSIS — Z87.42 HISTORY OF PCOS: ICD-10-CM

## 2023-03-22 PROBLEM — Z97.5 IUD (INTRAUTERINE DEVICE) IN PLACE: Status: RESOLVED | Noted: 2022-03-03 | Resolved: 2023-03-22

## 2023-03-22 NOTE — PROGRESS NOTES
Answer:   None Given     Order Specific Question:   Screening or Diagnostic     Answer:   Screening     Order Specific Question:   HPV Requested? Answer:   Yes     Order Specific Question:   High Risk Patient     Answer:   N/A    Follicle Stimulating Hormone     Standing Status:   Future     Standing Expiration Date:   3/22/2024    Luteinizing Hormone     Standing Status:   Future     Standing Expiration Date:   3/22/2024    Glucose, Fasting     Standing Status:   Future     Standing Expiration Date:   3/21/2024    Insulin, total     Patient must be fasting for 12-14 hrs     Standing Status:   Future     Standing Expiration Date:   4/21/2023    Hemoglobin A1C     Standing Status:   Future     Standing Expiration Date:   3/22/2024    Glucose Tolerance, 2 Hrs     Standing Status:   Future     Standing Expiration Date:   3/22/2024    Glucose, Random     Standing Status:   Future     Standing Expiration Date:   3/22/2024    ALT     Standing Status:   Future     Standing Expiration Date:   3/21/2024    AST     Standing Status:   Future     Standing Expiration Date:   3/21/2024    BUN & Creatinine     Standing Status:   Future     Standing Expiration Date:   3/21/2024    Testosterone     Standing Status:   Future     Standing Expiration Date:   3/21/2024           The patient, Teresa Adams is a 28 y.o. female, was seen with a total time spent of 30 minutes for the visit on this date of service by the E/M provider. The time component had both face to face and non face to face time spent in determining the total time component. Counseling and education regarding her diagnosis listed below and her options regarding those diagnoses were also included in determining her time component. Diagnosis Orders   1. Visit for gynecologic examination  PAP Smear      2. Screening for human papillomavirus (HPV)  PAP Smear      3.  History of PCOS  Follicle Stimulating Hormone    Luteinizing Hormone    Glucose, Fasting

## 2023-03-24 LAB
HPV SAMPLE: NORMAL
HPV, GENOTYPE 16: NOT DETECTED
HPV, GENOTYPE 18: NOT DETECTED
HPV, HIGH RISK OTHER: NOT DETECTED
HPV, INTERPRETATION: NORMAL
SPECIMEN DESCRIPTION: NORMAL

## 2023-03-29 ENCOUNTER — HOSPITAL ENCOUNTER (OUTPATIENT)
Age: 28
Discharge: HOME | End: 2023-03-29
Payer: COMMERCIAL

## 2023-03-29 DIAGNOSIS — Z87.42 HISTORY OF PCOS: ICD-10-CM

## 2023-03-29 LAB
ALT SERPL-CCNC: 23 U/L (ref 5–33)
AST SERPL-CCNC: 19 U/L
BUN SERPL-MCNC: 17 MG/DL (ref 6–20)
CREAT SERPL-MCNC: 0.57 MG/DL (ref 0.5–0.9)
EST. AVERAGE GLUCOSE BLD GHB EST-MCNC: 94 MG/DL
FOLLICLE STIMULATING HORMONE: 4.4 MIU/ML (ref 1.7–21.5)
GFR SERPL CREATININE-BSD FRML MDRD: >60 ML/MIN/1.73M2
GLUCOSE SERPL-MCNC: 84 MG/DL (ref 70–99)
HBA1C MFR BLD: 4.9 % (ref 4–6)
INSULIN COMMENT: NORMAL
INSULIN REFERENCE RANGE:: NORMAL
INSULIN: 32.6 MU/L
LH: 9.8 MIU/ML (ref 1–95.6)

## 2023-03-29 PROCEDURE — 84460 ALANINE AMINO (ALT) (SGPT): CPT

## 2023-03-29 PROCEDURE — 84520 ASSAY OF UREA NITROGEN: CPT

## 2023-03-29 PROCEDURE — 83036 HEMOGLOBIN GLYCOSYLATED A1C: CPT

## 2023-03-29 PROCEDURE — 83525 ASSAY OF INSULIN: CPT

## 2023-03-29 PROCEDURE — 83002 ASSAY OF GONADOTROPIN (LH): CPT

## 2023-03-29 PROCEDURE — 82565 ASSAY OF CREATININE: CPT

## 2023-03-29 PROCEDURE — 84450 TRANSFERASE (AST) (SGOT): CPT

## 2023-03-29 PROCEDURE — 82947 ASSAY GLUCOSE BLOOD QUANT: CPT

## 2023-03-29 PROCEDURE — 36415 COLL VENOUS BLD VENIPUNCTURE: CPT

## 2023-03-29 PROCEDURE — 83001 ASSAY OF GONADOTROPIN (FSH): CPT

## 2023-03-30 ENCOUNTER — TELEPHONE (OUTPATIENT)
Dept: OBGYN CLINIC | Age: 28
End: 2023-03-30

## 2023-03-30 LAB — CYTOLOGY REPORT: NORMAL

## 2023-03-30 RX ORDER — METFORMIN HYDROCHLORIDE 500 MG/1
TABLET, EXTENDED RELEASE ORAL
Qty: 38 TABLET | Refills: 0 | Status: SHIPPED | OUTPATIENT
Start: 2023-03-30

## 2023-03-30 NOTE — TELEPHONE ENCOUNTER
----- Message from LUCY Valentin NP sent at 3/30/2023  8:35 AM EDT -----  Labs indicated PCOS  Metformin 500 mg PO QD x 7 days then increase to BID

## 2023-03-30 NOTE — TELEPHONE ENCOUNTER
Patient notified of results and recommendations, script for metformin to be sent to [facility].

## 2023-05-04 ENCOUNTER — OFFICE VISIT (OUTPATIENT)
Dept: FAMILY MEDICINE CLINIC | Age: 28
End: 2023-05-04

## 2023-05-04 VITALS
HEIGHT: 61 IN | RESPIRATION RATE: 20 BRPM | WEIGHT: 240 LBS | OXYGEN SATURATION: 100 % | DIASTOLIC BLOOD PRESSURE: 72 MMHG | BODY MASS INDEX: 45.31 KG/M2 | SYSTOLIC BLOOD PRESSURE: 124 MMHG | HEART RATE: 87 BPM

## 2023-05-04 DIAGNOSIS — F34.1 PERSISTENT DEPRESSIVE DISORDER: ICD-10-CM

## 2023-05-04 DIAGNOSIS — Z13.0 SCREENING FOR DEFICIENCY ANEMIA: ICD-10-CM

## 2023-05-04 DIAGNOSIS — G56.03 BILATERAL CARPAL TUNNEL SYNDROME: ICD-10-CM

## 2023-05-04 DIAGNOSIS — Z76.89 ENCOUNTER TO ESTABLISH CARE: Primary | ICD-10-CM

## 2023-05-04 DIAGNOSIS — Z13.1 SCREENING FOR DIABETES MELLITUS: ICD-10-CM

## 2023-05-04 RX ORDER — FLUOXETINE HYDROCHLORIDE 20 MG/1
20 CAPSULE ORAL DAILY
Qty: 30 CAPSULE | Refills: 1 | Status: SHIPPED | OUTPATIENT
Start: 2023-05-04

## 2023-05-04 SDOH — ECONOMIC STABILITY: FOOD INSECURITY: WITHIN THE PAST 12 MONTHS, THE FOOD YOU BOUGHT JUST DIDN'T LAST AND YOU DIDN'T HAVE MONEY TO GET MORE.: NEVER TRUE

## 2023-05-04 SDOH — ECONOMIC STABILITY: INCOME INSECURITY: HOW HARD IS IT FOR YOU TO PAY FOR THE VERY BASICS LIKE FOOD, HOUSING, MEDICAL CARE, AND HEATING?: NOT HARD AT ALL

## 2023-05-04 SDOH — ECONOMIC STABILITY: FOOD INSECURITY: WITHIN THE PAST 12 MONTHS, YOU WORRIED THAT YOUR FOOD WOULD RUN OUT BEFORE YOU GOT MONEY TO BUY MORE.: NEVER TRUE

## 2023-05-04 SDOH — ECONOMIC STABILITY: HOUSING INSECURITY
IN THE LAST 12 MONTHS, WAS THERE A TIME WHEN YOU DID NOT HAVE A STEADY PLACE TO SLEEP OR SLEPT IN A SHELTER (INCLUDING NOW)?: NO

## 2023-05-04 ASSESSMENT — PATIENT HEALTH QUESTIONNAIRE - PHQ9
3. TROUBLE FALLING OR STAYING ASLEEP: 1
7. TROUBLE CONCENTRATING ON THINGS, SUCH AS READING THE NEWSPAPER OR WATCHING TELEVISION: 2
SUM OF ALL RESPONSES TO PHQ QUESTIONS 1-9: 10
8. MOVING OR SPEAKING SO SLOWLY THAT OTHER PEOPLE COULD HAVE NOTICED. OR THE OPPOSITE, BEING SO FIGETY OR RESTLESS THAT YOU HAVE BEEN MOVING AROUND A LOT MORE THAN USUAL: 0
1. LITTLE INTEREST OR PLEASURE IN DOING THINGS: 2
10. IF YOU CHECKED OFF ANY PROBLEMS, HOW DIFFICULT HAVE THESE PROBLEMS MADE IT FOR YOU TO DO YOUR WORK, TAKE CARE OF THINGS AT HOME, OR GET ALONG WITH OTHER PEOPLE: 1
6. FEELING BAD ABOUT YOURSELF - OR THAT YOU ARE A FAILURE OR HAVE LET YOURSELF OR YOUR FAMILY DOWN: 1
2. FEELING DOWN, DEPRESSED OR HOPELESS: 1
5. POOR APPETITE OR OVEREATING: 2
SUM OF ALL RESPONSES TO PHQ QUESTIONS 1-9: 10
4. FEELING TIRED OR HAVING LITTLE ENERGY: 1
SUM OF ALL RESPONSES TO PHQ9 QUESTIONS 1 & 2: 3
9. THOUGHTS THAT YOU WOULD BE BETTER OFF DEAD, OR OF HURTING YOURSELF: 0
SUM OF ALL RESPONSES TO PHQ QUESTIONS 1-9: 10
SUM OF ALL RESPONSES TO PHQ QUESTIONS 1-9: 10

## 2023-05-04 ASSESSMENT — ENCOUNTER SYMPTOMS
EYE PAIN: 0
COLOR CHANGE: 0
BACK PAIN: 0
CHEST TIGHTNESS: 0
SINUS PRESSURE: 0
NAUSEA: 0
DIARRHEA: 0
ABDOMINAL PAIN: 0
SINUS PAIN: 0
SHORTNESS OF BREATH: 0
VOMITING: 0
CONSTIPATION: 0

## 2023-05-04 NOTE — PROGRESS NOTES
Melvina Caballero (:  1995) is a 28 y.o. female,New patient, here for evaluation of the following chief complaint(s):  New Patient (Previously with Brad Mcdonald. /) and Health Maintenance (Depression screen complete. /SDOH completed. /)         ASSESSMENT/PLAN:  1. Encounter to establish care  Comments:  Rx for labs - follow up pending results  2. Screening for deficiency anemia  -     CBC with Auto Differential; Future  3. Screening for diabetes mellitus  -     Comprehensive Metabolic Panel; Future  4. Persistent depressive disorder  Comments:  Genesight results scanned into media. Positive depression screen, restart prozac 20mg. Follow up in 1 month. Orders:  -     FLUoxetine (PROZAC) 20 MG capsule; Take 1 capsule by mouth daily, Disp-30 capsule, R-1Normal  5. Bilateral carpal tunnel syndrome  -     Misc. Devices MISC; ONCE PRN Starting Thu 2023, Until Thu 2023 at 2359, For 1 dose, Disp-2 each, R-0, PrintBilateral wrist braces - wear 12 hours at night when sleeping, can wear during the day as needed  Exercises provided in AVS    Return in about 4 weeks (around 2023) for Depression/Anxiety. Subjective   SUBJECTIVE/OBJECTIVE:  Presents to establish care as new patient  RADHA Yang - full time  1 son   Engaged     GYN: Diagnosed with PCOS  Has not yet started metformin, hesitant about use     Anxiety/Depression: Had been on prozac in the past, helped quite a bit  Previous PCP stopped prozac because was told it caused weight gain. Changed to wellbutrin, this made anxiety much worse. Completed Epiclist, wellbutrin was in red. Would like to restart prozac.     Numbness, tingling right wrist/hand x1 year   Wakes her up at night  Occasionally in left arm  Writing and typing makes paresthesias worse  Completed EMG - right wrist was suggestive of carpal tunnel syndrome  Was given Rx for wrist splints, unable to     Denies any other problems/concerns at this time

## 2023-05-31 ENCOUNTER — OFFICE VISIT (OUTPATIENT)
Dept: FAMILY MEDICINE CLINIC | Age: 28
End: 2023-05-31
Payer: COMMERCIAL

## 2023-05-31 ENCOUNTER — HOSPITAL ENCOUNTER (OUTPATIENT)
Age: 28
Setting detail: SPECIMEN
Discharge: HOME | End: 2023-05-31

## 2023-05-31 VITALS
HEIGHT: 61 IN | WEIGHT: 235.2 LBS | SYSTOLIC BLOOD PRESSURE: 122 MMHG | RESPIRATION RATE: 20 BRPM | BODY MASS INDEX: 44.4 KG/M2 | HEART RATE: 50 BPM | DIASTOLIC BLOOD PRESSURE: 72 MMHG | OXYGEN SATURATION: 96 %

## 2023-05-31 DIAGNOSIS — F34.1 PERSISTENT DEPRESSIVE DISORDER: Primary | ICD-10-CM

## 2023-05-31 DIAGNOSIS — G44.209 TENSION HEADACHE: ICD-10-CM

## 2023-05-31 DIAGNOSIS — E55.9 VITAMIN D DEFICIENCY: ICD-10-CM

## 2023-05-31 DIAGNOSIS — Z13.0 SCREENING FOR DEFICIENCY ANEMIA: ICD-10-CM

## 2023-05-31 DIAGNOSIS — Z13.1 SCREENING FOR DIABETES MELLITUS: ICD-10-CM

## 2023-05-31 DIAGNOSIS — R42 DIZZINESS: ICD-10-CM

## 2023-05-31 LAB
25(OH)D3 SERPL-MCNC: 55.2 NG/ML
ALBUMIN SERPL-MCNC: 4.3 G/DL (ref 3.5–5.2)
ALBUMIN/GLOB SERPL: 1.5 {RATIO} (ref 1–2.5)
ALP SERPL-CCNC: 64 U/L (ref 35–104)
ALT SERPL-CCNC: 27 U/L (ref 5–33)
ANION GAP SERPL CALCULATED.3IONS-SCNC: 12 MMOL/L (ref 9–17)
AST SERPL-CCNC: 22 U/L
BASOPHILS # BLD: 0.03 K/UL (ref 0–0.2)
BASOPHILS NFR BLD: 0 % (ref 0–2)
BILIRUB SERPL-MCNC: 0.5 MG/DL (ref 0.3–1.2)
BUN SERPL-MCNC: 18 MG/DL (ref 6–20)
CALCIUM SERPL-MCNC: 8.9 MG/DL (ref 8.6–10.4)
CHLORIDE SERPL-SCNC: 104 MMOL/L (ref 98–107)
CO2 SERPL-SCNC: 21 MMOL/L (ref 20–31)
CREAT SERPL-MCNC: 0.71 MG/DL (ref 0.5–0.9)
EOSINOPHIL # BLD: 0.08 K/UL (ref 0–0.44)
EOSINOPHILS RELATIVE PERCENT: 1 % (ref 1–4)
ERYTHROCYTE [DISTWIDTH] IN BLOOD BY AUTOMATED COUNT: 12.6 % (ref 11.8–14.4)
GFR SERPL CREATININE-BSD FRML MDRD: >60 ML/MIN/1.73M2
GLUCOSE SERPL-MCNC: 90 MG/DL (ref 70–99)
HCT VFR BLD AUTO: 39 % (ref 36.3–47.1)
HGB BLD-MCNC: 12.7 G/DL (ref 11.9–15.1)
IMM GRANULOCYTES # BLD AUTO: 0.03 K/UL (ref 0–0.3)
IMM GRANULOCYTES NFR BLD: 0 %
LYMPHOCYTES # BLD: 37 % (ref 24–43)
LYMPHOCYTES NFR BLD: 2.73 K/UL (ref 1.1–3.7)
MCH RBC QN AUTO: 28.9 PG (ref 25.2–33.5)
MCHC RBC AUTO-ENTMCNC: 32.6 G/DL (ref 28.4–34.8)
MCV RBC AUTO: 88.6 FL (ref 82.6–102.9)
MONOCYTES NFR BLD: 0.53 K/UL (ref 0.1–1.2)
MONOCYTES NFR BLD: 7 % (ref 3–12)
NEUTROPHILS NFR BLD: 55 % (ref 36–65)
NEUTS SEG NFR BLD: 4.07 K/UL (ref 1.5–8.1)
NRBC AUTOMATED: 0 PER 100 WBC
PLATELET # BLD AUTO: 225 K/UL (ref 138–453)
PMV BLD AUTO: 12.5 FL (ref 8.1–13.5)
POTASSIUM SERPL-SCNC: 4.3 MMOL/L (ref 3.7–5.3)
PROT SERPL-MCNC: 7.1 G/DL (ref 6.4–8.3)
RBC # BLD AUTO: 4.4 M/UL (ref 3.95–5.11)
SODIUM SERPL-SCNC: 137 MMOL/L (ref 135–144)
WBC OTHER # BLD: 7.5 K/UL (ref 3.5–11.3)

## 2023-05-31 PROCEDURE — G8417 CALC BMI ABV UP PARAM F/U: HCPCS | Performed by: NURSE PRACTITIONER

## 2023-05-31 PROCEDURE — 99214 OFFICE O/P EST MOD 30 MIN: CPT | Performed by: NURSE PRACTITIONER

## 2023-05-31 PROCEDURE — 1036F TOBACCO NON-USER: CPT | Performed by: NURSE PRACTITIONER

## 2023-05-31 PROCEDURE — G8427 DOCREV CUR MEDS BY ELIG CLIN: HCPCS | Performed by: NURSE PRACTITIONER

## 2023-05-31 RX ORDER — FLUOXETINE HYDROCHLORIDE 20 MG/1
20 CAPSULE ORAL DAILY
Qty: 90 CAPSULE | Refills: 1 | Status: SHIPPED | OUTPATIENT
Start: 2023-05-31

## 2023-05-31 ASSESSMENT — ENCOUNTER SYMPTOMS
VOMITING: 0
ABDOMINAL PAIN: 0
SINUS PRESSURE: 0
SHORTNESS OF BREATH: 0
CONSTIPATION: 0
EYE PAIN: 0
SINUS PAIN: 0
COLOR CHANGE: 0
DIARRHEA: 0
NAUSEA: 0
CHEST TIGHTNESS: 0
BACK PAIN: 0

## 2023-05-31 NOTE — PROGRESS NOTES
Leonel Avila (:  1995) is a 33 y.o. female,Established patient, here for evaluation of the following chief complaint(s):  Depression and Anxiety (4 week follow up)         ASSESSMENT/PLAN:  1. Persistent depressive disorder  Comments:  Stable. Continue prozac 20mg daily. Orders:  -     FLUoxetine (PROZAC) 20 MG capsule; Take 1 capsule by mouth daily, Disp-90 capsule, R-1Normal  2. Dizziness  Comments:  Rx for 2d echo, EKG - follow up pending results. Orders:  -     ECHO 2D WO Color Doppler Complete; Future  -     EKG 12 lead; Future  3. Vitamin D deficiency  -     Vitamin D 25 Hydroxy; Future  4. Tension headache  Begin headache journal  She would like to retry OTC Excedrin tension migraine     Return in about 6 months (around 2023) for 6 month follow up. Subjective   SUBJECTIVE/OBJECTIVE:  Presents for 1 month follow up of depression/anxiety   Has lost 5lbs since last OV - trying, happy with this     Depression: Happy with prozac. Feels like she is back to herself. Denies thoughts of SI/HI   HR in the 50s today - reports she is much more calm, has been told in the past she has low heart rate   Admits to intermittent dizziness, did not notice it until after re-starting prozac. Reports dizziness has improved significantly over the last 2 weeks. Hx of heart murmur, diagnosed at 1 months old  Was referred to cardiologist 9 years ago while pregnant, was told all is well     Tension migraine, feels like rubberband is around her head  Under quite a bit of stress at home  Hx of frequent migraines, triggered by stress  Denies nausea/vomiting, vision changes         Review of Systems   Constitutional:  Negative for activity change, chills, fatigue and unexpected weight change. HENT:  Negative for hearing loss, postnasal drip, sinus pressure and sinus pain. Eyes:  Negative for pain and visual disturbance. Respiratory:  Negative for chest tightness and shortness of breath.

## 2023-06-01 DIAGNOSIS — E55.9 VITAMIN D DEFICIENCY: ICD-10-CM

## 2023-08-22 ENCOUNTER — HOSPITAL ENCOUNTER (EMERGENCY)
Age: 28
Discharge: HOME | End: 2023-08-23
Attending: STUDENT IN AN ORGANIZED HEALTH CARE EDUCATION/TRAINING PROGRAM
Payer: COMMERCIAL

## 2023-08-22 ENCOUNTER — APPOINTMENT (OUTPATIENT)
Dept: GENERAL RADIOLOGY | Age: 28
End: 2023-08-22
Payer: COMMERCIAL

## 2023-08-22 DIAGNOSIS — S93.402A SPRAIN OF LEFT ANKLE, UNSPECIFIED LIGAMENT, INITIAL ENCOUNTER: Primary | ICD-10-CM

## 2023-08-22 PROCEDURE — 73630 X-RAY EXAM OF FOOT: CPT

## 2023-08-22 PROCEDURE — 99283 EMERGENCY DEPT VISIT LOW MDM: CPT

## 2023-08-22 PROCEDURE — 73610 X-RAY EXAM OF ANKLE: CPT

## 2023-08-22 PROCEDURE — 6370000000 HC RX 637 (ALT 250 FOR IP): Performed by: STUDENT IN AN ORGANIZED HEALTH CARE EDUCATION/TRAINING PROGRAM

## 2023-08-22 RX ORDER — IBUPROFEN 800 MG/1
800 TABLET ORAL ONCE
Status: COMPLETED | OUTPATIENT
Start: 2023-08-22 | End: 2023-08-22

## 2023-08-22 RX ORDER — ACETAMINOPHEN 500 MG
1000 TABLET ORAL ONCE
Status: COMPLETED | OUTPATIENT
Start: 2023-08-22 | End: 2023-08-22

## 2023-08-22 RX ADMIN — ACETAMINOPHEN 1000 MG: 500 TABLET ORAL at 23:13

## 2023-08-22 RX ADMIN — IBUPROFEN 800 MG: 800 TABLET, FILM COATED ORAL at 23:14

## 2023-08-22 ASSESSMENT — PAIN SCALES - GENERAL: PAINLEVEL_OUTOF10: 8

## 2023-08-22 ASSESSMENT — PAIN - FUNCTIONAL ASSESSMENT: PAIN_FUNCTIONAL_ASSESSMENT: 0-10

## 2023-08-22 ASSESSMENT — ENCOUNTER SYMPTOMS
ABDOMINAL PAIN: 0
NAUSEA: 0
DIARRHEA: 0
EYE REDNESS: 0
VOMITING: 0
EYE DISCHARGE: 0
SHORTNESS OF BREATH: 0
RHINORRHEA: 0
SORE THROAT: 0

## 2023-08-23 VITALS
OXYGEN SATURATION: 100 % | HEART RATE: 67 BPM | RESPIRATION RATE: 18 BRPM | TEMPERATURE: 98 F | WEIGHT: 230 LBS | HEIGHT: 61 IN | BODY MASS INDEX: 43.43 KG/M2 | SYSTOLIC BLOOD PRESSURE: 129 MMHG | DIASTOLIC BLOOD PRESSURE: 81 MMHG

## 2023-08-23 NOTE — ED TRIAGE NOTES
Mode of arrival (squad #, walk in, police, etc) : W/C        Chief complaint(s): ANKLE INJURY        Arrival Note (brief scenario, treatment PTA, etc). : pt states she fell down the stairs in her dankos and now has limited movement in her left ankle. Swelling noted. Pt is able to move toes        C= \"Have you ever felt that you should Cut down on your drinking? \"  No  A= \"Have people Annoyed you by criticizing your drinking? \"  No  G= \"Have you ever felt bad or Guilty about your drinking? \"  No  E= \"Have you ever had a drink as an Eye-opener first thing in the morning to steady your nerves or to help a hangover? \"  No      Deferred []      Reason for deferring: N/A    *If yes to two or more: probable alcohol abuse. *

## 2023-08-23 NOTE — ED NOTES
Applied Ice compress in the affected area (left ankle).      [facility], RN  [phone]

## 2023-08-23 NOTE — ED PROVIDER NOTES
Pupils: Pupils are equal, round, and reactive to light. Cardiovascular:      Rate and Rhythm: Normal rate and regular rhythm. Pulses: Normal pulses. Heart sounds: Normal heart sounds. Pulmonary:      Effort: Pulmonary effort is normal.      Breath sounds: Normal breath sounds. Abdominal:      Palpations: Abdomen is soft. Tenderness: There is no abdominal tenderness. Musculoskeletal:         General: No swelling or deformity. Cervical back: Normal range of motion. Comments: Tenderness at the lateral malleolus and dorsum of the foot    2+ pulses sensation intact no tenderness in the knee   Skin:     General: Skin is warm. Findings: No rash. Neurological:      General: No focal deficit present. Mental Status: She is alert and oriented to person, place, and time.    Psychiatric:         Mood and Affect: Mood normal.       MEDICAL DECISION MAKING / ED COURSE:     62-year-old with foot and ankle injury    We will obtain x-rays provide Tylenol Motrin and ice    1)  Number and Complexity of Problems Addressed at this Encounter  Problem List This Visit: Foot and ankle injury    Differential Diagnosis: Fracture, contusion, sprain    Diagnoses Considered but Do Not Suspect: Neurovascular injury    Pertinent Comorbid Conditions: Obesity    2)  Data Reviewed and Analyzed  (Lab and radiology tests/orders below in next section)      Imaging that is independently reviewed and interpreted by me as:  xray without fracture    3)  Treatment and Disposition      ED Course as of 08/22/23 2341   Tue Aug 22, 2023   2331 XR FOOT LEFT (MIN 3 VIEWS)  negative [FRANTZ]   2331 XR ANKLE LEFT (MIN 3 VIEWS)  negative [RFANTZ]      ED Course User Index  [FRANTZ] Stephie Linda MD           Disposition discussion with patient/family, Shared Decision Making: Discussed with patient unremarkable x-rays  plan for Aircast, crutches, rest ice elevation follow-up with primary doctor return for worsening          DATA FOR

## 2023-08-23 NOTE — ED NOTES
Discharge instructions reviewed. Encouraged to f/u with PCP and/or Ortho. Encouraged to take Tylenol and Motrin for pain. Work noted provided. Educated to [address] Wheelchair provided to private vehicle.      Neel Negro RN  08/22/23 [id number]

## 2023-09-07 ENCOUNTER — APPOINTMENT (OUTPATIENT)
Dept: GENERAL RADIOLOGY | Age: 28
End: 2023-09-07
Payer: COMMERCIAL

## 2023-09-07 ENCOUNTER — HOSPITAL ENCOUNTER (EMERGENCY)
Age: 28
Discharge: HOME | End: 2023-09-07
Attending: EMERGENCY MEDICINE
Payer: COMMERCIAL

## 2023-09-07 VITALS
SYSTOLIC BLOOD PRESSURE: 109 MMHG | BODY MASS INDEX: 43.43 KG/M2 | HEIGHT: 61 IN | RESPIRATION RATE: 15 BRPM | WEIGHT: 230 LBS | HEART RATE: 51 BPM | OXYGEN SATURATION: 98 % | TEMPERATURE: 98.3 F | DIASTOLIC BLOOD PRESSURE: 69 MMHG

## 2023-09-07 DIAGNOSIS — R07.9 CHEST PAIN, UNSPECIFIED TYPE: Primary | ICD-10-CM

## 2023-09-07 DIAGNOSIS — R51.9 NONINTRACTABLE HEADACHE, UNSPECIFIED CHRONICITY PATTERN, UNSPECIFIED HEADACHE TYPE: ICD-10-CM

## 2023-09-07 LAB
ALBUMIN SERPL-MCNC: 4.4 G/DL (ref 3.5–5.2)
ALP SERPL-CCNC: 67 U/L (ref 35–104)
ALT SERPL-CCNC: 28 U/L (ref 5–33)
ANION GAP SERPL CALCULATED.3IONS-SCNC: 10 MMOL/L (ref 9–17)
AST SERPL-CCNC: 26 U/L
BACTERIA URNS QL MICRO: ABNORMAL
BASOPHILS # BLD: 0 K/UL (ref 0–0.2)
BASOPHILS NFR BLD: 0 % (ref 0–2)
BILIRUB SERPL-MCNC: 0.2 MG/DL (ref 0.3–1.2)
BILIRUB UR QL STRIP: NEGATIVE
BUN SERPL-MCNC: 11 MG/DL (ref 6–20)
CALCIUM SERPL-MCNC: 9.4 MG/DL (ref 8.6–10.4)
CASTS #/AREA URNS LPF: ABNORMAL /LPF
CHLORIDE SERPL-SCNC: 103 MMOL/L (ref 98–107)
CLARITY UR: CLEAR
CO2 SERPL-SCNC: 27 MMOL/L (ref 20–31)
COLOR UR: YELLOW
CREAT SERPL-MCNC: 0.7 MG/DL (ref 0.5–0.9)
EOSINOPHIL # BLD: 0 K/UL (ref 0–0.4)
EOSINOPHILS RELATIVE PERCENT: 1 % (ref 0–4)
EPI CELLS #/AREA URNS HPF: ABNORMAL /HPF
ERYTHROCYTE [DISTWIDTH] IN BLOOD BY AUTOMATED COUNT: 13.3 % (ref 11.5–14.9)
FLUAV RNA RESP QL NAA+PROBE: NOT DETECTED
FLUBV RNA RESP QL NAA+PROBE: NOT DETECTED
GFR SERPL CREATININE-BSD FRML MDRD: >60 ML/MIN/1.73M2
GLUCOSE SERPL-MCNC: 83 MG/DL (ref 70–99)
GLUCOSE UR STRIP-MCNC: NEGATIVE MG/DL
HCG SERPL QL: NEGATIVE
HCT VFR BLD AUTO: 39.3 % (ref 36–46)
HETEROPH AB BLD QL IA: NEGATIVE
HGB BLD-MCNC: 12.9 G/DL (ref 12–16)
HGB UR QL STRIP.AUTO: ABNORMAL
KETONES UR STRIP-MCNC: NEGATIVE MG/DL
LEUKOCYTE ESTERASE UR QL STRIP: NEGATIVE
LYMPHOCYTES NFR BLD: 2.1 K/UL (ref 1–4.8)
LYMPHOCYTES RELATIVE PERCENT: 30 % (ref 24–44)
MCH RBC QN AUTO: 29 PG (ref 26–34)
MCHC RBC AUTO-ENTMCNC: 32.9 G/DL (ref 31–37)
MCV RBC AUTO: 88.2 FL (ref 80–100)
MONOCYTES NFR BLD: 0.3 K/UL (ref 0.1–1.3)
MONOCYTES NFR BLD: 5 % (ref 1–7)
NEUTROPHILS NFR BLD: 64 % (ref 36–66)
NEUTS SEG NFR BLD: 4.3 K/UL (ref 1.3–9.1)
NITRITE UR QL STRIP: NEGATIVE
PH UR STRIP: 6.5 [PH] (ref 5–8)
PLATELET # BLD AUTO: 238 K/UL (ref 150–450)
PMV BLD AUTO: 9.5 FL (ref 6–12)
POTASSIUM SERPL-SCNC: 4 MMOL/L (ref 3.7–5.3)
PROT SERPL-MCNC: 7.4 G/DL (ref 6.4–8.3)
PROT UR STRIP-MCNC: NEGATIVE MG/DL
RBC # BLD AUTO: 4.46 M/UL (ref 4–5.2)
RBC #/AREA URNS HPF: ABNORMAL /HPF
SARS-COV-2 RNA RESP QL NAA+PROBE: NOT DETECTED
SODIUM SERPL-SCNC: 140 MMOL/L (ref 135–144)
SOURCE: NORMAL
SP GR UR STRIP: 1 (ref 1–1.03)
SPECIMEN DESCRIPTION: NORMAL
TROPONIN I SERPL HS-MCNC: <6 NG/L (ref 0–14)
TROPONIN I SERPL HS-MCNC: <6 NG/L (ref 0–14)
UROBILINOGEN UR STRIP-ACNC: NORMAL EU/DL (ref 0–1)
WBC #/AREA URNS HPF: ABNORMAL /HPF
WBC OTHER # BLD: 6.8 K/UL (ref 3.5–11)

## 2023-09-07 PROCEDURE — 80053 COMPREHEN METABOLIC PANEL: CPT

## 2023-09-07 PROCEDURE — 84484 ASSAY OF TROPONIN QUANT: CPT

## 2023-09-07 PROCEDURE — 85025 COMPLETE CBC W/AUTO DIFF WBC: CPT

## 2023-09-07 PROCEDURE — 36415 COLL VENOUS BLD VENIPUNCTURE: CPT

## 2023-09-07 PROCEDURE — 86308 HETEROPHILE ANTIBODY SCREEN: CPT

## 2023-09-07 PROCEDURE — 81001 URINALYSIS AUTO W/SCOPE: CPT

## 2023-09-07 PROCEDURE — 84703 CHORIONIC GONADOTROPIN ASSAY: CPT

## 2023-09-07 PROCEDURE — 93005 ELECTROCARDIOGRAM TRACING: CPT | Performed by: EMERGENCY MEDICINE

## 2023-09-07 PROCEDURE — 99285 EMERGENCY DEPT VISIT HI MDM: CPT

## 2023-09-07 PROCEDURE — 6370000000 HC RX 637 (ALT 250 FOR IP): Performed by: EMERGENCY MEDICINE

## 2023-09-07 PROCEDURE — 87636 SARSCOV2 & INF A&B AMP PRB: CPT

## 2023-09-07 PROCEDURE — 71045 X-RAY EXAM CHEST 1 VIEW: CPT

## 2023-09-07 RX ORDER — ACETAMINOPHEN 500 MG
1000 TABLET ORAL ONCE
Status: COMPLETED | OUTPATIENT
Start: 2023-09-07 | End: 2023-09-07

## 2023-09-07 RX ADMIN — ACETAMINOPHEN 1000 MG: 500 TABLET ORAL at 12:19

## 2023-09-07 ASSESSMENT — PAIN DESCRIPTION - LOCATION: LOCATION: HEAD;CHEST

## 2023-09-07 ASSESSMENT — PAIN - FUNCTIONAL ASSESSMENT: PAIN_FUNCTIONAL_ASSESSMENT: 0-10

## 2023-09-07 ASSESSMENT — PAIN DESCRIPTION - DESCRIPTORS: DESCRIPTORS: DULL

## 2023-09-07 ASSESSMENT — LIFESTYLE VARIABLES
HOW OFTEN DO YOU HAVE A DRINK CONTAINING ALCOHOL: MONTHLY OR LESS
HOW MANY STANDARD DRINKS CONTAINING ALCOHOL DO YOU HAVE ON A TYPICAL DAY: 3 OR 4

## 2023-09-07 ASSESSMENT — PAIN SCALES - GENERAL
PAINLEVEL_OUTOF10: 4
PAINLEVEL_OUTOF10: 6

## 2023-09-07 ASSESSMENT — HEART SCORE: ECG: 1

## 2023-09-07 NOTE — ED TRIAGE NOTES
Mode of arrival (squad #, walk in, police, etc) : walk in        Chief complaint(s): fatigue, chest pain        Arrival Note (brief scenario, treatment PTA, etc). : Patient reports having generalized fatigue and mid sternal chest pain radiating to left shoulder, headache front to the right side. Patient has hx of heart murmur, tricuspid valve regurgitation. C= \"Have you ever felt that you should Cut down on your drinking? \"  No  A= \"Have people Annoyed you by criticizing your drinking? \"  No  G= \"Have you ever felt bad or Guilty about your drinking? \"  No  E= \"Have you ever had a drink as an Eye-opener first thing in the morning to steady your nerves or to help a hangover? \"  No      Deferred []      Reason for deferring: N/A    *If yes to two or more: probable alcohol abuse. *

## 2023-09-08 LAB
EKG ATRIAL RATE: 60 BPM
EKG P AXIS: 29 DEGREES
EKG P-R INTERVAL: 160 MS
EKG Q-T INTERVAL: 392 MS
EKG QRS DURATION: 88 MS
EKG QTC CALCULATION (BAZETT): 392 MS
EKG R AXIS: 25 DEGREES
EKG T AXIS: 22 DEGREES
EKG VENTRICULAR RATE: 60 BPM

## 2023-09-08 PROCEDURE — 93010 ELECTROCARDIOGRAM REPORT: CPT | Performed by: INTERNAL MEDICINE

## 2023-09-13 ENCOUNTER — OFFICE VISIT (OUTPATIENT)
Dept: FAMILY MEDICINE CLINIC | Age: 28
End: 2023-09-13
Payer: COMMERCIAL

## 2023-09-13 ENCOUNTER — HOSPITAL ENCOUNTER (OUTPATIENT)
Dept: CT IMAGING | Age: 28
Discharge: HOME | End: 2023-09-15
Payer: COMMERCIAL

## 2023-09-13 ENCOUNTER — HOSPITAL ENCOUNTER (OUTPATIENT)
Age: 28
Discharge: HOME | End: 2023-09-13
Payer: COMMERCIAL

## 2023-09-13 VITALS
RESPIRATION RATE: 20 BRPM | SYSTOLIC BLOOD PRESSURE: 112 MMHG | HEIGHT: 61 IN | OXYGEN SATURATION: 97 % | DIASTOLIC BLOOD PRESSURE: 70 MMHG | HEART RATE: 52 BPM | BODY MASS INDEX: 43.23 KG/M2 | WEIGHT: 229 LBS

## 2023-09-13 DIAGNOSIS — H53.9 VISION CHANGES: ICD-10-CM

## 2023-09-13 DIAGNOSIS — R51.9 RIGHT-SIDED HEADACHE: ICD-10-CM

## 2023-09-13 DIAGNOSIS — G43.009 MIGRAINE WITHOUT AURA AND WITHOUT STATUS MIGRAINOSUS, NOT INTRACTABLE: Primary | ICD-10-CM

## 2023-09-13 DIAGNOSIS — G43.009 MIGRAINE WITHOUT AURA AND WITHOUT STATUS MIGRAINOSUS, NOT INTRACTABLE: ICD-10-CM

## 2023-09-13 DIAGNOSIS — R51.9 RIGHT-SIDED HEADACHE: Primary | ICD-10-CM

## 2023-09-13 LAB — ERYTHROCYTE [SEDIMENTATION RATE] IN BLOOD BY PHOTOMETRIC METHOD: 13 MM/HR (ref 0–20)

## 2023-09-13 PROCEDURE — 85652 RBC SED RATE AUTOMATED: CPT

## 2023-09-13 PROCEDURE — G8427 DOCREV CUR MEDS BY ELIG CLIN: HCPCS | Performed by: NURSE PRACTITIONER

## 2023-09-13 PROCEDURE — 99214 OFFICE O/P EST MOD 30 MIN: CPT | Performed by: NURSE PRACTITIONER

## 2023-09-13 PROCEDURE — 36415 COLL VENOUS BLD VENIPUNCTURE: CPT

## 2023-09-13 PROCEDURE — 86140 C-REACTIVE PROTEIN: CPT

## 2023-09-13 PROCEDURE — G8417 CALC BMI ABV UP PARAM F/U: HCPCS | Performed by: NURSE PRACTITIONER

## 2023-09-13 PROCEDURE — 1036F TOBACCO NON-USER: CPT | Performed by: NURSE PRACTITIONER

## 2023-09-13 PROCEDURE — 70450 CT HEAD/BRAIN W/O DYE: CPT

## 2023-09-13 RX ORDER — TIZANIDINE 4 MG/1
4 TABLET ORAL NIGHTLY PRN
Qty: 10 TABLET | Refills: 0 | Status: SHIPPED | OUTPATIENT
Start: 2023-09-13

## 2023-09-13 ASSESSMENT — ENCOUNTER SYMPTOMS
SINUS PRESSURE: 0
VOMITING: 0
NAUSEA: 0
EYE PAIN: 0
ABDOMINAL PAIN: 0
SINUS PAIN: 0
BACK PAIN: 0
CONSTIPATION: 0
DIARRHEA: 0
CHEST TIGHTNESS: 0
COLOR CHANGE: 0
SHORTNESS OF BREATH: 0

## 2023-09-15 LAB — CRP SERPL HS-MCNC: 4.3 MG/L (ref 0–5)

## 2023-11-29 ENCOUNTER — OFFICE VISIT (OUTPATIENT)
Dept: FAMILY MEDICINE CLINIC | Age: 28
End: 2023-11-29
Payer: COMMERCIAL

## 2023-11-29 ENCOUNTER — HOSPITAL ENCOUNTER (OUTPATIENT)
Age: 28
Setting detail: SPECIMEN
Discharge: HOME | End: 2023-11-29

## 2023-11-29 VITALS
SYSTOLIC BLOOD PRESSURE: 114 MMHG | WEIGHT: 232 LBS | OXYGEN SATURATION: 100 % | RESPIRATION RATE: 18 BRPM | DIASTOLIC BLOOD PRESSURE: 78 MMHG | BODY MASS INDEX: 43.8 KG/M2 | HEIGHT: 61 IN | HEART RATE: 79 BPM

## 2023-11-29 DIAGNOSIS — R53.83 OTHER FATIGUE: ICD-10-CM

## 2023-11-29 DIAGNOSIS — F34.1 PERSISTENT DEPRESSIVE DISORDER: Primary | ICD-10-CM

## 2023-11-29 DIAGNOSIS — K21.9 GASTROESOPHAGEAL REFLUX DISEASE, UNSPECIFIED WHETHER ESOPHAGITIS PRESENT: ICD-10-CM

## 2023-11-29 LAB
ANION GAP SERPL CALCULATED.3IONS-SCNC: 9 MMOL/L (ref 9–17)
BUN SERPL-MCNC: 17 MG/DL (ref 6–20)
CALCIUM SERPL-MCNC: 9.4 MG/DL (ref 8.6–10.4)
CHLORIDE SERPL-SCNC: 100 MMOL/L (ref 98–107)
CO2 SERPL-SCNC: 25 MMOL/L (ref 20–31)
CREAT SERPL-MCNC: 0.8 MG/DL (ref 0.5–0.9)
ERYTHROCYTE [DISTWIDTH] IN BLOOD BY AUTOMATED COUNT: 12.2 % (ref 11.8–14.4)
FOLATE SERPL-MCNC: 9.6 NG/ML
GFR SERPL CREATININE-BSD FRML MDRD: >60 ML/MIN/1.73M2
GLUCOSE SERPL-MCNC: 78 MG/DL (ref 70–99)
HCT VFR BLD AUTO: 40.9 % (ref 36.3–47.1)
HGB BLD-MCNC: 13.2 G/DL (ref 11.9–15.1)
MCH RBC QN AUTO: 29.1 PG (ref 25.2–33.5)
MCHC RBC AUTO-ENTMCNC: 32.3 G/DL (ref 28.4–34.8)
MCV RBC AUTO: 90.3 FL (ref 82.6–102.9)
NRBC BLD-RTO: 0 PER 100 WBC
PLATELET # BLD AUTO: 227 K/UL (ref 138–453)
PMV BLD AUTO: 12.5 FL (ref 8.1–13.5)
POTASSIUM SERPL-SCNC: 4.3 MMOL/L (ref 3.7–5.3)
RBC # BLD AUTO: 4.53 M/UL (ref 3.95–5.11)
SODIUM SERPL-SCNC: 134 MMOL/L (ref 135–144)
T3FREE SERPL-MCNC: 3.23 PG/ML (ref 2.02–4.43)
T4 FREE SERPL-MCNC: 1 NG/DL (ref 0.9–1.7)
TSH SERPL DL<=0.05 MIU/L-ACNC: 2.92 UIU/ML (ref 0.3–5)
VIT B12 SERPL-MCNC: 670 PG/ML (ref 232–1245)
WBC OTHER # BLD: 7.3 K/UL (ref 3.5–11.3)

## 2023-11-29 PROCEDURE — G8427 DOCREV CUR MEDS BY ELIG CLIN: HCPCS | Performed by: NURSE PRACTITIONER

## 2023-11-29 PROCEDURE — G8417 CALC BMI ABV UP PARAM F/U: HCPCS | Performed by: NURSE PRACTITIONER

## 2023-11-29 PROCEDURE — 1036F TOBACCO NON-USER: CPT | Performed by: NURSE PRACTITIONER

## 2023-11-29 PROCEDURE — 99214 OFFICE O/P EST MOD 30 MIN: CPT | Performed by: NURSE PRACTITIONER

## 2023-11-29 PROCEDURE — G8484 FLU IMMUNIZE NO ADMIN: HCPCS | Performed by: NURSE PRACTITIONER

## 2023-11-29 RX ORDER — FAMOTIDINE 20 MG/1
20 TABLET, FILM COATED ORAL 2 TIMES DAILY
Qty: 180 TABLET | Refills: 1 | Status: SHIPPED | OUTPATIENT
Start: 2023-11-29

## 2023-11-29 RX ORDER — FLUOXETINE HYDROCHLORIDE 20 MG/1
20 CAPSULE ORAL DAILY
Qty: 90 CAPSULE | Refills: 1 | Status: SHIPPED | OUTPATIENT
Start: 2023-11-29

## 2023-11-29 ASSESSMENT — ENCOUNTER SYMPTOMS
CONSTIPATION: 0
CHEST TIGHTNESS: 0
EYE PAIN: 0
BACK PAIN: 0
SINUS PAIN: 0
DIARRHEA: 0
COLOR CHANGE: 0
NAUSEA: 0
SHORTNESS OF BREATH: 0
VOMITING: 0
ABDOMINAL PAIN: 0
SINUS PRESSURE: 0

## 2023-11-29 NOTE — PROGRESS NOTES
Hailee Hernández (:  1995) is a 33 y.o. female,Established patient, here for evaluation of the following chief complaint(s):  6 Month Follow-Up         ASSESSMENT/PLAN:  1. Persistent depressive disorder  Comments:  Stable. Continue prozac 20mg daily. Orders:  -     FLUoxetine (PROZAC) 20 MG capsule; Take 1 capsule by mouth daily, Disp-90 capsule, R-1Normal  2. Gastroesophageal reflux disease, unspecified whether esophagitis present  -     famotidine (PEPCID) 20 MG tablet; Take 1 tablet by mouth 2 times daily, Disp-180 tablet, R-1Normal  3. Other fatigue  -     T3, Free; Future  -     T4, Free; Future  -     TSH; Future  -     Thyroid Antibodies; Future  -     CBC; Future  -     Vitamin B12 & Folate; Future  -     Basic Metabolic Panel; Future  Rx for labs, follow up pending results     Return in about 6 months (around 2024) for physical.         Subjective   SUBJECTIVE/OBJECTIVE:  Presents for 6 month follow up of chronic conditions     Depression: well controlled on prozac  Mood feel stable   Denies thoughts of SI/HI    Fatigue: Reports continued fatigue x1 year, not getting better  Has days where she just can not pull herself out of bed out of pure exhaustion. Will sleep 8-9 hours a night uninterrupted and still wake up tired. Unable to loose weight. Just feels like something is off. Maternal aunt diagnosed with hashimotos, believes another female family member diagnosed with graves disease, her mom had thyroid goiter that required treatment. Denies any other problems/concerns at this time             Review of Systems   Constitutional:  Positive for fatigue. Negative for activity change, chills and unexpected weight change. HENT:  Negative for hearing loss, postnasal drip, sinus pressure and sinus pain. Eyes:  Negative for pain and visual disturbance. Respiratory:  Negative for chest tightness and shortness of breath. Cardiovascular:  Negative for chest pain and palpitations.

## 2023-12-01 LAB
THYROGLOBULIN AB: 79 IU/ML (ref 0–40)
THYROPEROXIDASE AB SERPL IA-ACNC: 31 IU/ML (ref 0–25)

## 2023-12-03 DIAGNOSIS — R76.8 THYROID ANTIBODY POSITIVE: Primary | ICD-10-CM

## 2023-12-06 ENCOUNTER — HOSPITAL ENCOUNTER (OUTPATIENT)
Dept: ULTRASOUND IMAGING | Age: 28
Discharge: HOME | End: 2023-12-08
Payer: COMMERCIAL

## 2023-12-06 DIAGNOSIS — R76.8 THYROID ANTIBODY POSITIVE: ICD-10-CM

## 2023-12-06 PROCEDURE — 76536 US EXAM OF HEAD AND NECK: CPT

## 2024-01-19 ENCOUNTER — TELEPHONE (OUTPATIENT)
Dept: FAMILY MEDICINE CLINIC | Age: 29
End: 2024-01-19

## 2024-01-19 NOTE — TELEPHONE ENCOUNTER
----- Message from Kristin Caballero sent at 1/19/2024 10:11 AM EST -----  Subject: Appointment Request    Reason for Call: Established Patient Appointment needed: Routine Physical   Exam    QUESTIONS    Reason for appointment request? No appointments available during search     Additional Information for Provider? PT has new insurance and needs her   physical before 3/22. Please call her to schedule  ---------------------------------------------------------------------------  --------------  CALL BACK INFO  [phone]; OK to leave message on voicemail  ---------------------------------------------------------------------------  --------------  SCRIPT ANSWERS   Baseline fluctuates with chemo, so leukopenia likely due to myelosuppression from chemo. NO signs/sx infection. 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

## 2024-02-14 ENCOUNTER — OFFICE VISIT (OUTPATIENT)
Dept: FAMILY MEDICINE CLINIC | Age: 29
End: 2024-02-14
Payer: COMMERCIAL

## 2024-02-14 VITALS
RESPIRATION RATE: 18 BRPM | DIASTOLIC BLOOD PRESSURE: 78 MMHG | WEIGHT: 235.5 LBS | OXYGEN SATURATION: 95 % | HEART RATE: 50 BPM | BODY MASS INDEX: 44.46 KG/M2 | SYSTOLIC BLOOD PRESSURE: 118 MMHG | HEIGHT: 61 IN

## 2024-02-14 DIAGNOSIS — L65.9 HAIR LOSS: ICD-10-CM

## 2024-02-14 DIAGNOSIS — R53.83 OTHER FATIGUE: ICD-10-CM

## 2024-02-14 DIAGNOSIS — Z84.0 FAMILY HISTORY OF LUPUS ERYTHEMATOSUS: ICD-10-CM

## 2024-02-14 DIAGNOSIS — Z00.00 ENCOUNTER FOR WELL ADULT EXAM WITHOUT ABNORMAL FINDINGS: Primary | ICD-10-CM

## 2024-02-14 DIAGNOSIS — F34.1 PERSISTENT DEPRESSIVE DISORDER: ICD-10-CM

## 2024-02-14 DIAGNOSIS — R76.8 THYROID ANTIBODY POSITIVE: ICD-10-CM

## 2024-02-14 DIAGNOSIS — F41.9 ANXIETY: ICD-10-CM

## 2024-02-14 DIAGNOSIS — E55.9 VITAMIN D DEFICIENCY: ICD-10-CM

## 2024-02-14 LAB
C-REACTIVE PROTEIN: 2.92
SEDIMENTATION RATE, ERYTHROCYTE: 22

## 2024-02-14 PROCEDURE — 99395 PREV VISIT EST AGE 18-39: CPT | Performed by: NURSE PRACTITIONER

## 2024-02-14 PROCEDURE — G8484 FLU IMMUNIZE NO ADMIN: HCPCS | Performed by: NURSE PRACTITIONER

## 2024-02-14 RX ORDER — LEVOTHYROXINE SODIUM 0.03 MG/1
25 TABLET ORAL DAILY
COMMUNITY
Start: 2024-02-06

## 2024-02-14 RX ORDER — FLUOXETINE HYDROCHLORIDE 40 MG/1
40 CAPSULE ORAL DAILY
Qty: 30 CAPSULE | Refills: 1 | Status: SHIPPED | OUTPATIENT
Start: 2024-02-14

## 2024-02-14 NOTE — PATIENT INSTRUCTIONS
Advance Care Planning     Advance Care Planning opens a door to talk about and write down your wishes before a sudden accident or illness.  Make your goals, values, and preferences known.     This puts you in the ’s seat and helps others know what matters most to you so they won’t have to guess.      Where can you learn more?    Go to [email or web address]   to learn how to:    Name someone you trust to make healthcare decisions for you, only if you can’t. (Healthcare Power of )    Document your wishes for care if you were seriously ill and not expected to recover or are approaching end of life. (Advance Directive or Living Will)    The same page can be found using the QR code below.                Starting a Weight Loss Plan: Care Instructions  Overview     If you're thinking about losing weight, it can be hard to know where to start. Your doctor can help you set up a weight loss plan that best meets your needs. You may want to take a class on nutrition or exercise, or you could join a weight loss support group. If you have questions about how to make changes to your eating or exercise habits, ask your doctor about seeing a registered dietitian or an exercise specialist.  It can be a big challenge to lose weight. But you don't have to make huge changes at once. Make small changes, and stick with them. When those changes become habit, add a few more changes.  If you don't think you're ready to make changes right now, try to pick a date in the future. Make an appointment to see your doctor to discuss whether the time is right for you to start a plan.  Follow-up care is a key part of your treatment and safety. Be sure to make and go to all appointments, and call your doctor if you are having problems. It's also a good idea to know your test results and keep a list of the medicines you take.  How can you care for yourself at home?  Set realistic goals. Many people

## 2024-02-14 NOTE — PROGRESS NOTES
Well Adult Note  Name: Luz Butler Today’s Date: 2024   MRN: [id number] Sex: Female   Age: 28 y.o. Ethnicity: Non- / Non    : 1995 Race: White (non-)      Luz Butler is here for well adult exam.  History:    Presents for annual physical     Met with Endo earlier this month - did not have a good experience. Was told she is not sure why she was sent to endo that all of her symptoms were due to obesity. Was started on levothyroxine 25mcg daily but was told by specialist it would not do anything for her because she's essentially overweight. Was also told she should follow up with rheumatology because grandmother  from lupus and sjogren's syndrome disease. Very frustrated with entire experience, just wants to feel better. Would like referral to another specialist - looking into endo on [address]     Depression: Symptoms feel a bit more exacerbated recently   Would like dose increase of prozac  Denies past or current thoughts of SI/HI  Actively avoiding pregnancy, denies concerns today of this     Willing to update some labs   Denies any other problems/concerns at this time     Review of Systems   Constitutional:  Positive for fatigue. Negative for activity change, chills and unexpected weight change.   HENT:  Negative for hearing loss, postnasal drip, sinus pressure and sinus pain.    Eyes:  Negative for pain and visual disturbance.   Respiratory:  Negative for chest tightness and shortness of breath.    Cardiovascular:  Negative for chest pain and palpitations.   Gastrointestinal:  Negative for abdominal pain, constipation, diarrhea, nausea and vomiting.   Endocrine: Negative for polydipsia, polyphagia and polyuria.   Genitourinary:  Negative for dysuria, flank pain, frequency and urgency.   Musculoskeletal:  Negative for arthralgias, back pain and myalgias.   Skin:  Negative for color change and rash.   Neurological:  Negative for dizziness, weakness, light-headedness,

## 2024-02-16 LAB
ANTI DNA DOUBLE STRANDED: <0.6 IU/ML
C-REACTIVE PROTEIN: 2.92 MG/L
ENA ANTIBODIES SCREEN: <0.7 RATIO
ERYTHROCYTE SEDIMENTATION RATE: 22 MM/HR

## 2024-02-19 DIAGNOSIS — Z84.0 FAMILY HISTORY OF LUPUS ERYTHEMATOSUS: ICD-10-CM

## 2024-03-06 ENCOUNTER — OFFICE VISIT (OUTPATIENT)
Dept: BARIATRICS/WEIGHT MGMT | Age: 29
End: 2024-03-06
Payer: COMMERCIAL

## 2024-03-06 VITALS
WEIGHT: 237 LBS | BODY MASS INDEX: 44.75 KG/M2 | DIASTOLIC BLOOD PRESSURE: 70 MMHG | HEIGHT: 61 IN | SYSTOLIC BLOOD PRESSURE: 110 MMHG | HEART RATE: 60 BPM

## 2024-03-06 DIAGNOSIS — E66.01 CLASS 3 SEVERE OBESITY DUE TO EXCESS CALORIES WITH SERIOUS COMORBIDITY AND BODY MASS INDEX (BMI) OF 40.0 TO 44.9 IN ADULT (HCC): Primary | ICD-10-CM

## 2024-03-06 DIAGNOSIS — E28.2 PCOS (POLYCYSTIC OVARIAN SYNDROME): ICD-10-CM

## 2024-03-06 DIAGNOSIS — E06.3 HASHIMOTO'S THYROIDITIS: ICD-10-CM

## 2024-03-06 PROCEDURE — G8427 DOCREV CUR MEDS BY ELIG CLIN: HCPCS | Performed by: NURSE PRACTITIONER

## 2024-03-06 PROCEDURE — G8484 FLU IMMUNIZE NO ADMIN: HCPCS | Performed by: NURSE PRACTITIONER

## 2024-03-06 PROCEDURE — 99204 OFFICE O/P NEW MOD 45 MIN: CPT | Performed by: NURSE PRACTITIONER

## 2024-03-06 PROCEDURE — G8417 CALC BMI ABV UP PARAM F/U: HCPCS | Performed by: NURSE PRACTITIONER

## 2024-03-06 PROCEDURE — 1036F TOBACCO NON-USER: CPT | Performed by: NURSE PRACTITIONER

## 2024-03-06 RX ORDER — AMOXICILLIN 500 MG/1
500 CAPSULE ORAL 3 TIMES DAILY
COMMUNITY
Start: 2024-02-28

## 2024-03-06 RX ORDER — PHENTERMINE HYDROCHLORIDE 37.5 MG/1
37.5 TABLET ORAL
Qty: 30 TABLET | Refills: 0 | Status: SHIPPED | OUTPATIENT
Start: 2024-03-06 | End: 2024-04-05

## 2024-03-06 NOTE — PROGRESS NOTES
Other treatment: Medication: phentermine    6. Will have dietician meet patient at follow up appointments. Discussed with patient I recommend that the write down topics over the next 4 weeks that they feel they may struggle with then it comes to their diet/food choices. Also recommend to get a notebook and document a food diary 2-3 times per week and bring it to next appt    7. Potential side effects of the medication discussed. Discussed dosing, administration of medication.    8. Follow up: 4 weeks and as needed.         History reviewed, examine appropriate, BMI appropriate, ruled out contraindication for controlled substance utilization, patient is free from signs of drug or alcohol abuse, there is no medical contradiction for the use of adipex, OARRS reviewed without evidence of suspicious or aberrant behavior and we have developed a treatment plan which includes medication, diet and exercise of help with weight loss.     Discussed if BMI gets below 30, patient needs to have a BMI above 27 with obesity related comorbid factors.     Discussed we will see the patient monthly and in the first 3 months, they needs to lose 5% of their body weight in order to continue the 5medication. If patient gains weight while on the adipex or does not lose the 5% body weight loss after 3 months, adipex will be stopped.   Adipex will also be stopped if the patient has made any false or misleading statements to me related to the patient's use of drugs or alcohol, the patient has consumed other stimulants from other providers, the patient has repeatedly failed to comply with my treatment recommendations, patient has not tolerated the  medication or if the patient becomes pregnant.

## 2024-03-13 ENCOUNTER — TELEMEDICINE (OUTPATIENT)
Dept: FAMILY MEDICINE CLINIC | Age: 29
End: 2024-03-13
Payer: COMMERCIAL

## 2024-03-13 DIAGNOSIS — R51.9 NONINTRACTABLE HEADACHE, UNSPECIFIED CHRONICITY PATTERN, UNSPECIFIED HEADACHE TYPE: ICD-10-CM

## 2024-03-13 DIAGNOSIS — K21.9 GASTROESOPHAGEAL REFLUX DISEASE, UNSPECIFIED WHETHER ESOPHAGITIS PRESENT: ICD-10-CM

## 2024-03-13 DIAGNOSIS — F41.9 ANXIETY: ICD-10-CM

## 2024-03-13 DIAGNOSIS — F34.1 PERSISTENT DEPRESSIVE DISORDER: Primary | ICD-10-CM

## 2024-03-13 PROCEDURE — 1036F TOBACCO NON-USER: CPT | Performed by: NURSE PRACTITIONER

## 2024-03-13 PROCEDURE — G8417 CALC BMI ABV UP PARAM F/U: HCPCS | Performed by: NURSE PRACTITIONER

## 2024-03-13 PROCEDURE — 99214 OFFICE O/P EST MOD 30 MIN: CPT | Performed by: NURSE PRACTITIONER

## 2024-03-13 PROCEDURE — G8427 DOCREV CUR MEDS BY ELIG CLIN: HCPCS | Performed by: NURSE PRACTITIONER

## 2024-03-13 PROCEDURE — G8484 FLU IMMUNIZE NO ADMIN: HCPCS | Performed by: NURSE PRACTITIONER

## 2024-03-13 RX ORDER — FAMOTIDINE 20 MG/1
20 TABLET, FILM COATED ORAL 2 TIMES DAILY
Qty: 180 TABLET | Refills: 1 | Status: SHIPPED | OUTPATIENT
Start: 2024-03-13

## 2024-03-13 RX ORDER — IBUPROFEN 800 MG/1
800 TABLET ORAL EVERY 12 HOURS PRN
Qty: 90 TABLET | Refills: 1 | Status: SHIPPED | OUTPATIENT
Start: 2024-03-13

## 2024-03-13 RX ORDER — FLUOXETINE HYDROCHLORIDE 40 MG/1
40 CAPSULE ORAL DAILY
Qty: 90 CAPSULE | Refills: 1 | Status: SHIPPED | OUTPATIENT
Start: 2024-03-13

## 2024-03-13 ASSESSMENT — ENCOUNTER SYMPTOMS
NAUSEA: 0
SINUS PRESSURE: 0
COLOR CHANGE: 0
ABDOMINAL PAIN: 0
BACK PAIN: 0
VOMITING: 0
CHEST TIGHTNESS: 0
EYE PAIN: 0
CONSTIPATION: 0
SINUS PAIN: 0
SHORTNESS OF BREATH: 0
DIARRHEA: 0

## 2024-03-13 NOTE — PROGRESS NOTES
Luz Butler, was evaluated through a synchronous (real-time) audio-video encounter. The patient (or guardian if applicable) is aware that this is a billable service, which includes applicable co-pays. This Virtual Visit was conducted with patient's (and/or legal guardian's) consent. Patient identification was verified, and a caregiver was present when appropriate.   The patient was located at Home: [address] [place] [id number]  Provider was located at Facility (Appt Dept): [address] [place] [address]      Luz Butler (:  1995) is a Established patient, presenting virtually for evaluation of the following:    Assessment & Plan   Below is the assessment and plan developed based on review of pertinent history, physical exam, labs, studies, and medications.  1. Persistent depressive disorder  Comments:  Stable. Continue prozac 40mg daily.  Orders:  -     FLUoxetine (PROZAC) 40 MG capsule; Take 1 capsule by mouth daily, Disp-90 capsule, R-1Normal  2. Anxiety  Comments:  Stable. Continue prozac 40mg daily.  Orders:  -     FLUoxetine (PROZAC) 40 MG capsule; Take 1 capsule by mouth daily, Disp-90 capsule, R-1Normal  3. Gastroesophageal reflux disease, unspecified whether esophagitis present  Comments:  Stable. Continue pepcid 20mg BID.  Orders:  -     famotidine (PEPCID) 20 MG tablet; Take 1 tablet by mouth 2 times daily, Disp-180 tablet, R-1Normal  4. Nonintractable headache, unspecified chronicity pattern, unspecified headache type  -     ibuprofen (ADVIL;MOTRIN) 800 MG tablet; Take 1 tablet by mouth every 12 hours as needed for Pain or Fever, Disp-90 tablet, R-1Normal  Continue prn ibuprofen       No follow-ups on file.       Subjective   Presents for 1 month f/u of anxiety/depression  Feeling much better after dose increase of prozac  Mood is stable  Denies thoughts of SI/HI  Denies s/e with use of medication    Started on adipex through bariatrics - has felt increase in energy

## 2024-03-27 ENCOUNTER — OFFICE VISIT (OUTPATIENT)
Dept: OBGYN CLINIC | Age: 29
End: 2024-03-27
Payer: COMMERCIAL

## 2024-03-27 ENCOUNTER — HOSPITAL ENCOUNTER (OUTPATIENT)
Age: 29
Setting detail: SPECIMEN
Discharge: HOME | End: 2024-03-27

## 2024-03-27 VITALS
HEIGHT: 67 IN | BODY MASS INDEX: 36.26 KG/M2 | SYSTOLIC BLOOD PRESSURE: 106 MMHG | DIASTOLIC BLOOD PRESSURE: 76 MMHG | WEIGHT: 231 LBS

## 2024-03-27 DIAGNOSIS — Z01.419 WELL WOMAN EXAM WITH ROUTINE GYNECOLOGICAL EXAM: Primary | ICD-10-CM

## 2024-03-27 DIAGNOSIS — Z11.51 SPECIAL SCREENING EXAMINATION FOR HUMAN PAPILLOMAVIRUS (HPV): ICD-10-CM

## 2024-03-27 DIAGNOSIS — E06.9 THYROIDITIS: ICD-10-CM

## 2024-03-27 PROCEDURE — 99395 PREV VISIT EST AGE 18-39: CPT | Performed by: NURSE PRACTITIONER

## 2024-03-27 PROCEDURE — G8484 FLU IMMUNIZE NO ADMIN: HCPCS | Performed by: NURSE PRACTITIONER

## 2024-03-27 NOTE — PROGRESS NOTES
On evaluation there was no evidence of hepatosplenomegaly and there was no costal vertebral libertad tenderness bilaterally.  No hernias were appreciated.     Abdominal Scars: intact     Psych:  The patient had a normal Orientation to: Time, Place, Person, and Situation  There is no Mood / Affect changes    Breast:  (Chest)  normal appearance, no masses or tenderness, Inspection negative, No nipple retraction or dimpling, No nipple discharge or bleeding, No axillary or supraclavicular adenopathy, Normal to palpation without dominant masses  Self breast exams were reviewed in detail. Literature was given.    Pelvic Exam:  External genitalia: normal general appearance  Urinary system: urethral meatus normal  Vaginal: normal mucosa without prolapse or lesions  Cervix: normal appearance  Adnexa: normal bimanual exam  Uterus: normal single, nontender    Rectal Exam:  exam declined by patient          Musculosk:  Normal Gait and station was noted.  Digits were evaluated without abnormal findings.  Range of motion, stability and strength were evaluated and found to be appropriate for the patients age.        ASSESSMENT:      28 y.o. Annual   Diagnosis Orders   1. Well woman exam with routine gynecological exam  PAP SMEAR      2. Special screening examination for human papillomavirus (HPV)  PAP SMEAR      3. Thyroiditis               Chief Complaint   Patient presents with    Annual Exam          Past Medical History:   Diagnosis Date    Anxiety and depression 2016    Anxiety and depression     Depression 2018    Depression 2018    Gastroesophageal reflux disease 2022    Heart murmur     History of  section     Obesity 2015    Thyroiditis          Patient Active Problem List    Diagnosis Date Noted    Heart murmur      Priority: Low     Refer to cardiologist       Thyroiditis 2024    Vitamin D deficiency 2022    Chronic midline thoracic back pain 2022    Neck pain,

## 2024-03-29 LAB
HPV I/H RISK 4 DNA CVX QL NAA+PROBE: DETECTED
HPV SAMPLE: ABNORMAL
HPV, INTERPRETATION: ABNORMAL
HPV16 DNA CVX QL NAA+PROBE: NOT DETECTED
HPV18 DNA CVX QL NAA+PROBE: NOT DETECTED
SPECIMEN DESCRIPTION: ABNORMAL

## 2024-04-03 ENCOUNTER — OFFICE VISIT (OUTPATIENT)
Dept: BARIATRICS/WEIGHT MGMT | Age: 29
End: 2024-04-03
Payer: COMMERCIAL

## 2024-04-03 VITALS
SYSTOLIC BLOOD PRESSURE: 120 MMHG | WEIGHT: 229 LBS | DIASTOLIC BLOOD PRESSURE: 70 MMHG | HEIGHT: 67 IN | HEART RATE: 70 BPM | BODY MASS INDEX: 35.94 KG/M2

## 2024-04-03 DIAGNOSIS — E28.2 PCOS (POLYCYSTIC OVARIAN SYNDROME): ICD-10-CM

## 2024-04-03 DIAGNOSIS — E66.01 CLASS 3 SEVERE OBESITY DUE TO EXCESS CALORIES WITH SERIOUS COMORBIDITY AND BODY MASS INDEX (BMI) OF 40.0 TO 44.9 IN ADULT (HCC): Primary | ICD-10-CM

## 2024-04-03 DIAGNOSIS — E06.3 HASHIMOTO'S THYROIDITIS: ICD-10-CM

## 2024-04-03 PROCEDURE — G8417 CALC BMI ABV UP PARAM F/U: HCPCS | Performed by: NURSE PRACTITIONER

## 2024-04-03 PROCEDURE — 1036F TOBACCO NON-USER: CPT | Performed by: NURSE PRACTITIONER

## 2024-04-03 PROCEDURE — 99213 OFFICE O/P EST LOW 20 MIN: CPT | Performed by: NURSE PRACTITIONER

## 2024-04-03 PROCEDURE — G8427 DOCREV CUR MEDS BY ELIG CLIN: HCPCS | Performed by: NURSE PRACTITIONER

## 2024-04-03 RX ORDER — PHENTERMINE HYDROCHLORIDE 37.5 MG/1
37.5 TABLET ORAL
Qty: 30 TABLET | Refills: 0 | Status: SHIPPED | OUTPATIENT
Start: 2024-04-03 | End: 2024-05-03

## 2024-04-03 ASSESSMENT — ENCOUNTER SYMPTOMS
NAUSEA: 0
CHEST TIGHTNESS: 0
SHORTNESS OF BREATH: 0
WHEEZING: 0
ABDOMINAL PAIN: 0
COUGH: 0

## 2024-04-03 NOTE — PROGRESS NOTES
[facility] INVASIVE BARIATRIC SURG  [address]  [address]  [place] [id number]  Dept: [phone]    MEDICATION WEIGHT LOSS MANAGEMENT PROGRAM  PROGRESS NOTE     CC: Weight Loss    Patient: Luz Butler      Service Date: 4/3/2024  Visit:   1 month follow up on medications    Medical Record #: [id number]  Current Visit Weight Information  Weight: 103.9 kg (229 lb)   BMI: Body mass index is 35.87 kg/m².        History: 28 y.o. female who presents today for a 1 month follow up on weight loss medication. Patient has been following monthly with our office for the management of obesity since March 2024.    Medication: adipex  Amount of weight loss in the last month: 8 lbs  Amount of weight loss total: 8 lbs  Current dose: 37.5 mg  Side Effects: constipation which she is managing with fiber and sugar free apple juice. She states she cannot drinking coffee with it because it will make her shaky. Otherwise tolerating it well and avoiding coffee.     Patient has been doing excellent with diet changes. She has increased her protein and typically getting over 60 grams per day.     Height: 1.702 m (5' 7\")  Highest Weight:   237 lbs      Exercising?   [] Yes    [x] No   She states she is working a lot  Review of Systems:     Review of Systems   Constitutional:  Negative for appetite change, chills, diaphoresis, fatigue and fever.        + obesity   Respiratory:  Negative for cough, chest tightness, shortness of breath and wheezing.    Cardiovascular:  Negative for chest pain, palpitations and leg swelling.   Gastrointestinal:  Negative for abdominal pain, nausea and vomiting.   Skin:  Negative for pallor and rash.   Neurological:  Negative for dizziness, weakness, light-headedness and headaches.   All other systems reviewed and are negative.      Physical Assessment:     /70 (Site: Right Upper Arm, Position: Sitting, Cuff Size: Large Adult)   Pulse 70   Ht

## 2024-04-03 NOTE — PROGRESS NOTES
Medical Nutrition Therapy for Non-Surgical Weight Loss  Nutrition Follow-Up: Weight Loss Medication    Nutrition Assessment:  Patient is taking adipex per JAD Bergeron for weight loss. Patient has lost 8 lbs since last visit.   Patient's nutrition concerns include needing a plan to stick with diet. Has had some issues with constipation, trying to increase fiber. Discussed.   Patient is eating 3 meals, 1 snack per day. Eats vegetables 2-3 times per day.   Patient is drinking at least 64 oz of fluid and sugar free beverages. Patient is typically drinking water.  Patient reports not adding physical activity to daily life to remain active. Encouraged to get into routine, start slow but find something that works.  Discussed easy breakfast ideas (provided handout), focus on basics of myplate, portion control for meals, find enjoyable exercise.    24-hr diet recall scanned into chart.    Vitals:   Vitals:    04/03/24 1057   BP: 120/70   Site: Right Upper Arm   Position: Sitting   Cuff Size: Large Adult   Pulse: 70   Weight: 103.9 kg (229 lb)   Height: 1.702 m (5' 7\")        Body mass index is 35.87 kg/m².    Estimated Energy Needs:  Calorie (MSJ xAF - 500): 1650 kcals  Protein: 60-80 grams protein  Fluids: minimum 64 oz fluid    Nutrition Diagnosis:  Overweight/Obesity related to complex combination of decreased energy needs, disordered eating patterns, physical inactivity, and increased psychological/life stress as evidenced by Body mass index is 35.87 kg/m².    Nutrition Intervention:  Diet: Low-Fat Diet, 60-80 gm of protein, and 64 oz of fluid    Nutrition Education: Nutrition Care Manual    Goal:   Eat a variety of fruits and vegetables, lean protein, whole grains and low-fat dairy.   Sip on water or sugar-free fluid throughout the day.   Aim for 3 meals and 1-2 snacks daily.    Discuss activity with physician and determine a plan for remaining active.    Monitor/Evaluate:  Diet tolerance, protein intake, fluid intake,

## 2024-04-11 LAB — CYTOLOGY REPORT: NORMAL

## 2024-04-30 LAB — INSULIN: 11.3 MIU/ML

## 2024-05-01 ENCOUNTER — OFFICE VISIT (OUTPATIENT)
Dept: BARIATRICS/WEIGHT MGMT | Age: 29
End: 2024-05-01
Payer: COMMERCIAL

## 2024-05-01 VITALS
BODY MASS INDEX: 42.67 KG/M2 | HEIGHT: 61 IN | WEIGHT: 226 LBS | DIASTOLIC BLOOD PRESSURE: 80 MMHG | SYSTOLIC BLOOD PRESSURE: 120 MMHG | HEART RATE: 60 BPM

## 2024-05-01 DIAGNOSIS — E06.3 HASHIMOTO'S THYROIDITIS: ICD-10-CM

## 2024-05-01 DIAGNOSIS — E66.01 CLASS 3 SEVERE OBESITY DUE TO EXCESS CALORIES WITH SERIOUS COMORBIDITY AND BODY MASS INDEX (BMI) OF 40.0 TO 44.9 IN ADULT (HCC): ICD-10-CM

## 2024-05-01 DIAGNOSIS — E28.2 PCOS (POLYCYSTIC OVARIAN SYNDROME): ICD-10-CM

## 2024-05-01 PROCEDURE — 99213 OFFICE O/P EST LOW 20 MIN: CPT | Performed by: NURSE PRACTITIONER

## 2024-05-01 PROCEDURE — 1036F TOBACCO NON-USER: CPT | Performed by: NURSE PRACTITIONER

## 2024-05-01 PROCEDURE — G8417 CALC BMI ABV UP PARAM F/U: HCPCS | Performed by: NURSE PRACTITIONER

## 2024-05-01 PROCEDURE — G8427 DOCREV CUR MEDS BY ELIG CLIN: HCPCS | Performed by: NURSE PRACTITIONER

## 2024-05-01 RX ORDER — PHENTERMINE HYDROCHLORIDE 37.5 MG/1
37.5 TABLET ORAL
Qty: 30 TABLET | Refills: 0 | Status: SHIPPED | OUTPATIENT
Start: 2024-05-01 | End: 2024-05-31

## 2024-05-01 ASSESSMENT — ENCOUNTER SYMPTOMS
ABDOMINAL PAIN: 0
NAUSEA: 0
VOMITING: 0
SHORTNESS OF BREATH: 0
COUGH: 0
WHEEZING: 0
CHEST TIGHTNESS: 0

## 2024-05-01 NOTE — PROGRESS NOTES
[facility] INVASIVE BARIATRIC SURG  [address]  [address]  [place] [id number]  Dept: [phone]    MEDICATION WEIGHT LOSS MANAGEMENT PROGRAM  PROGRESS NOTE     CC: Weight Loss    Patient: Luz Butler      Service Date: 5/1/2024  Visit:   1 month follow up on medications    Medical Record #: [id number]  Current Visit Weight Information  Weight: 102.5 kg (226 lb)   BMI: Body mass index is 42.7 kg/m².        History: 28 y.o. female who presents today for a 1 month follow up on weight loss medication. Patient has been following monthly with our office for the management of obesity since March 2024.    Medication: adipex  Amount of weight loss in the last month: 3 lbs  Amount of weight loss total: 11 lbs  Current dose: 37.5 mg  Side Effects: none currently. Previous issue with constipation has improved with increasing fiber and using stool softener prn.     Patient has been doing excellent with diet changes. She has increased her protein and typically getting over 60 grams per day. Diet recall reviewed and it looks great.    She is saw her endocrinology today. They have referred her to sleep medicine and she will be seeing them this month.     Height: 1.549 m (5' 1\")  Highest Weight:   237 lbs      Exercising?   [] Yes    [x] No   She has been increasing her walking with her son at the park  Review of Systems:     Review of Systems   Constitutional:  Negative for appetite change, chills, diaphoresis, fatigue and fever.        + obesity   Respiratory:  Negative for cough, chest tightness, shortness of breath and wheezing.    Cardiovascular:  Negative for chest pain, palpitations and leg swelling.   Gastrointestinal:  Negative for abdominal pain, nausea and vomiting.   Skin:  Negative for pallor and rash.   Neurological:  Negative for dizziness, weakness, light-headedness and headaches.   All other systems reviewed and are negative.      Physical

## 2024-05-24 ENCOUNTER — OFFICE VISIT (OUTPATIENT)
Age: 29
End: 2024-05-24
Payer: COMMERCIAL

## 2024-05-24 VITALS
BODY MASS INDEX: 42.7 KG/M2 | DIASTOLIC BLOOD PRESSURE: 81 MMHG | RESPIRATION RATE: 20 BRPM | HEART RATE: 59 BPM | OXYGEN SATURATION: 96 % | TEMPERATURE: 98 F | SYSTOLIC BLOOD PRESSURE: 123 MMHG | WEIGHT: 226 LBS

## 2024-05-24 DIAGNOSIS — E66.01 CLASS 3 SEVERE OBESITY DUE TO EXCESS CALORIES WITH SERIOUS COMORBIDITY AND BODY MASS INDEX (BMI) OF 40.0 TO 44.9 IN ADULT (HCC): Primary | ICD-10-CM

## 2024-05-24 PROCEDURE — 1036F TOBACCO NON-USER: CPT | Performed by: NURSE PRACTITIONER

## 2024-05-24 PROCEDURE — 99213 OFFICE O/P EST LOW 20 MIN: CPT | Performed by: NURSE PRACTITIONER

## 2024-05-24 PROCEDURE — G8427 DOCREV CUR MEDS BY ELIG CLIN: HCPCS | Performed by: NURSE PRACTITIONER

## 2024-05-24 PROCEDURE — G8417 CALC BMI ABV UP PARAM F/U: HCPCS | Performed by: NURSE PRACTITIONER

## 2024-05-24 ASSESSMENT — ENCOUNTER SYMPTOMS
SHORTNESS OF BREATH: 0
NAUSEA: 0
ABDOMINAL PAIN: 0
VOMITING: 0
WHEEZING: 0
COUGH: 0
CHEST TIGHTNESS: 0

## 2024-05-24 NOTE — PROGRESS NOTES
Medical Nutrition Therapy for Non-Surgical Weight Loss  Nutrition Follow-Up: Weight Loss Medication    Nutrition Assessment:  Patient is taking adipex per SINDHU Thompson for weight loss. Patient has lost 0 lbs since last visit.   Patient's nutrition concerns include difficulty wanting to eat when it's warm out. Has been trying to do a greek yogurt or egg in the morning.  Patient is eating 2 times per day. Sometimes snacks like babybel cheese, protein bar. Has had some veggies bagged for easy snacks as well.  Patient is drinking at least 100 oz of fluid and sugar free beverages. Patient is typically drinking water. Has been also drinking some body armour lyte.  Patient reports walking 1-2 miles, 2-3 times per week to remain active.   Discussed different ideas for protein shakes, choosing some cold, lighter snacks and meals for summer. Continue to follow myplate meal pattern as able. Continue to incorporate exercise.    24-hr diet recall scanned into chart.    Vitals:   Vitals:    05/24/24 1026   BP: 123/81   Pulse: 59   Resp: 20   Temp: 98 °F (36.7 °C)   SpO2: 96%   Weight: 102.5 kg (226 lb)      Body mass index is 42.7 kg/m².    Estimated Energy Needs:  Calorie (MSJ x AF - 500): 1650 kcals  Protein: 60-80 grams protein  Fluids: minimum 64 oz fluid    Nutrition Diagnosis:  Overweight/Obesity related to complex combination of decreased energy needs, disordered eating patterns, physical inactivity, and increased psychological/life stress as evidenced by Body mass index is 42.7 kg/m².    Nutrition Intervention:  Diet: Low-Fat Diet, 60-80gm of protein, and 64oz of fluid    Nutrition Education: Nutrition Care Manual    Goal:   Eat a variety of fruits and vegetables, lean protein, whole grains and low-fat dairy.   Sip on water or sugar-free fluid throughout the day.   Aim for 3 meals and 1-2 snacks daily.    Discuss activity with physician and determine a plan for remaining active.    Monitor/Evaluate:  Diet

## 2024-05-24 NOTE — PROGRESS NOTES
[facility] INVASIVE BARIATRIC SURGERY  [address] [place] [address]  Dept: [phone]    MEDICATION WEIGHT LOSS MANAGEMENT PROGRAM  PROGRESS NOTE     CC: Weight Loss    Patient: Luz Butler      Service Date: 5/24/2024  Visit:   1 month follow up on medications    Medical Record #: [id number]  Current Visit Weight Information  Weight: 102.5 kg (226 lb)   BMI: Body mass index is 42.7 kg/m².        History: 28 y.o. female who presents today for a 1 month follow up on weight loss medication. Patient has been following monthly with our office for the management of obesity since March 2024.    Medication: adipex  Amount of weight loss in the last month: 0 lbs  Amount of weight loss total: 11 lbs  Current dose: 37.5 mg  Side Effects: none currently. Previous issue with constipation has improved with increasing fiber and using stool softener prn.     Patient has been doing excellent with diet changes. She has increased her protein and typically getting over 60 grams per day. Diet recall reviewed and it looks great.    She has been off the adipex for about 2 1/2 weeks because the pharmacy messed up her order. She just got her 3rd prescription on 5/22 and restarted it.     Height:   5 ft 1 in  Highest Weight:   237 lbs      Exercising?   [] Yes    [x] No   She has been increasing her walking with her son at the park. Walking 1-2 miles, 3-4 times per week  Review of Systems:     Review of Systems   Constitutional:  Negative for appetite change, chills, diaphoresis, fatigue and fever.        + obesity   Respiratory:  Negative for cough, chest tightness, shortness of breath and wheezing.    Cardiovascular:  Negative for chest pain, palpitations and leg swelling.   Gastrointestinal:  Negative for abdominal pain, nausea and vomiting.   Skin:  Negative for pallor and rash.   Neurological:  Negative for dizziness, weakness, light-headedness and headaches.

## 2024-06-17 DIAGNOSIS — E06.3 HASHIMOTO'S THYROIDITIS: ICD-10-CM

## 2024-06-17 DIAGNOSIS — E28.2 PCOS (POLYCYSTIC OVARIAN SYNDROME): ICD-10-CM

## 2024-06-17 DIAGNOSIS — E66.01 CLASS 3 SEVERE OBESITY DUE TO EXCESS CALORIES WITH SERIOUS COMORBIDITY AND BODY MASS INDEX (BMI) OF 40.0 TO 44.9 IN ADULT (HCC): ICD-10-CM

## 2024-06-17 RX ORDER — PHENTERMINE HYDROCHLORIDE 37.5 MG/1
37.5 TABLET ORAL
Qty: 30 TABLET | Refills: 0 | Status: SHIPPED | OUTPATIENT
Start: 2024-06-17 | End: 2024-07-17

## 2024-06-26 ENCOUNTER — OFFICE VISIT (OUTPATIENT)
Dept: BARIATRICS/WEIGHT MGMT | Age: 29
End: 2024-06-26
Payer: COMMERCIAL

## 2024-06-26 VITALS
SYSTOLIC BLOOD PRESSURE: 110 MMHG | DIASTOLIC BLOOD PRESSURE: 70 MMHG | HEART RATE: 70 BPM | BODY MASS INDEX: 41.16 KG/M2 | WEIGHT: 218 LBS | HEIGHT: 61 IN

## 2024-06-26 DIAGNOSIS — E66.01 CLASS 3 SEVERE OBESITY DUE TO EXCESS CALORIES WITH SERIOUS COMORBIDITY AND BODY MASS INDEX (BMI) OF 40.0 TO 44.9 IN ADULT (HCC): ICD-10-CM

## 2024-06-26 DIAGNOSIS — E06.3 HASHIMOTO'S THYROIDITIS: ICD-10-CM

## 2024-06-26 DIAGNOSIS — E28.2 PCOS (POLYCYSTIC OVARIAN SYNDROME): ICD-10-CM

## 2024-06-26 PROCEDURE — 1036F TOBACCO NON-USER: CPT | Performed by: NURSE PRACTITIONER

## 2024-06-26 PROCEDURE — G8427 DOCREV CUR MEDS BY ELIG CLIN: HCPCS | Performed by: NURSE PRACTITIONER

## 2024-06-26 PROCEDURE — G8417 CALC BMI ABV UP PARAM F/U: HCPCS | Performed by: NURSE PRACTITIONER

## 2024-06-26 PROCEDURE — 99213 OFFICE O/P EST LOW 20 MIN: CPT | Performed by: NURSE PRACTITIONER

## 2024-06-26 RX ORDER — PHENTERMINE HYDROCHLORIDE 37.5 MG/1
37.5 TABLET ORAL
Qty: 30 TABLET | Refills: 0 | Status: SHIPPED | OUTPATIENT
Start: 2024-06-26 | End: 2024-07-26

## 2024-06-26 ASSESSMENT — ENCOUNTER SYMPTOMS
SHORTNESS OF BREATH: 0
VOMITING: 0
WHEEZING: 0
COUGH: 0
NAUSEA: 0
CHEST TIGHTNESS: 0
ABDOMINAL PAIN: 0

## 2024-06-26 NOTE — PROGRESS NOTES
dizziness, weakness, light-headedness and headaches.   All other systems reviewed and are negative.      Physical Assessment:     /70 (Site: Right Upper Arm, Position: Sitting, Cuff Size: Large Adult)   Pulse 70   Ht 1.549 m (5' 1\")   Wt 98.9 kg (218 lb)   BMI 41.19 kg/m²     Physical Exam  Vitals and nursing note reviewed.   Constitutional:       General: She is not in acute distress.     Appearance: Normal appearance. She is obese. She is not ill-appearing, toxic-appearing or diaphoretic.   HENT:      Head: Normocephalic and atraumatic.   Cardiovascular:      Rate and Rhythm: Normal rate and regular rhythm.      Pulses: Normal pulses.      Heart sounds: Normal heart sounds. No murmur heard.  Pulmonary:      Effort: Pulmonary effort is normal.      Breath sounds: Normal breath sounds. No wheezing or rhonchi.   Skin:     General: Skin is warm and dry.   Neurological:      General: No focal deficit present.      Mental Status: She is alert and oriented to person, place, and time.      Gait: Gait normal.   Psychiatric:         Mood and Affect: Mood normal.         Behavior: Behavior normal.         Thought Content: Thought content normal.         Judgment: Judgment normal.         Assessment & Plan:      1. Class 3 severe obesity due to excess calories with serious comorbidity and body mass index (BMI) of 40.0 to 44.9 in adult (HCC)    2. PCOS (polycystic ovarian syndrome)    3. Hashimoto's thyroiditis             [x] Daily protein (65-75gm/day)   [x] Exercise Regularly        Need for long term compliance and follow up stressed to patient  Dietician unavailable today, patient doing well and no issues with diet currently.   Continue using adipex, as prescribed  Doing well    Follow up: Return in about 4 weeks (around 7/24/2024) for adipex med f/u.      Orders placed this encounter:   No orders of the defined types were placed in this encounter.      New Prescriptions:   Orders Placed This Encounter

## 2024-07-24 ENCOUNTER — OFFICE VISIT (OUTPATIENT)
Dept: BARIATRICS/WEIGHT MGMT | Age: 29
End: 2024-07-24
Payer: COMMERCIAL

## 2024-07-24 VITALS
SYSTOLIC BLOOD PRESSURE: 110 MMHG | HEART RATE: 80 BPM | BODY MASS INDEX: 40.97 KG/M2 | HEIGHT: 61 IN | WEIGHT: 217 LBS | DIASTOLIC BLOOD PRESSURE: 70 MMHG

## 2024-07-24 DIAGNOSIS — E66.01 CLASS 3 SEVERE OBESITY DUE TO EXCESS CALORIES WITH SERIOUS COMORBIDITY AND BODY MASS INDEX (BMI) OF 40.0 TO 44.9 IN ADULT (HCC): ICD-10-CM

## 2024-07-24 DIAGNOSIS — E28.2 PCOS (POLYCYSTIC OVARIAN SYNDROME): Primary | ICD-10-CM

## 2024-07-24 DIAGNOSIS — E06.3 HASHIMOTO'S THYROIDITIS: ICD-10-CM

## 2024-07-24 PROCEDURE — 1036F TOBACCO NON-USER: CPT | Performed by: NURSE PRACTITIONER

## 2024-07-24 PROCEDURE — G8427 DOCREV CUR MEDS BY ELIG CLIN: HCPCS | Performed by: NURSE PRACTITIONER

## 2024-07-24 PROCEDURE — 99214 OFFICE O/P EST MOD 30 MIN: CPT | Performed by: NURSE PRACTITIONER

## 2024-07-24 PROCEDURE — G8417 CALC BMI ABV UP PARAM F/U: HCPCS | Performed by: NURSE PRACTITIONER

## 2024-07-24 RX ORDER — PHENTERMINE HYDROCHLORIDE 37.5 MG/1
37.5 TABLET ORAL
Qty: 30 TABLET | Refills: 0 | Status: SHIPPED | OUTPATIENT
Start: 2024-07-24 | End: 2024-08-23

## 2024-07-24 RX ORDER — AMOXICILLIN 500 MG/1
500 CAPSULE ORAL 3 TIMES DAILY
COMMUNITY

## 2024-07-24 ASSESSMENT — ENCOUNTER SYMPTOMS
CHEST TIGHTNESS: 0
WHEEZING: 0
NAUSEA: 0
COUGH: 0
VOMITING: 0
ABDOMINAL PAIN: 0
SHORTNESS OF BREATH: 0

## 2024-07-24 NOTE — PROGRESS NOTES
Medical Nutrition Therapy for Non-Surgical Weight Loss  Nutrition Follow-Up: Weight Loss Medication    Nutrition Assessment:  Patient is taking adipex per SINDHU Thompson for weight loss. Patient has lost 1 lbs since last visit.   Patient's nutrition concerns include trying to find foods that are easy to go. Reports inconsistency with meals this past month.   Patient is eating 2-4 times per day.   Patient is drinking at least 64 oz of fluid and sugar free beverages. Patient is typically drinking water and body armour lyte, coffee .  Patient reports walking and low-impact Youtube workout  to remain active.   Discussed easy lunch ideas, protein shakes.    24-hr diet recall scanned into chart.    Vitals:   Vitals:    07/24/24 1401   BP: 110/70   Site: Right Upper Arm   Position: Sitting   Cuff Size: Large Adult   Pulse: 80   Weight: 98.4 kg (217 lb)   Height: 1.549 m (5' 1\")      Body mass index is 41 kg/m².    Estimated Energy Needs:  Calorie (MSJ x AF - 500): 1650 kcals  Protein: 60-80 grams protein  Fluids: minimum 64 oz fluid    Nutrition Diagnosis:  Overweight/Obesity related to complex combination of decreased energy needs, disordered eating patterns, physical inactivity, and increased psychological/life stress as evidenced by Body mass index is 41 kg/m²..    Nutrition Intervention:  Diet: Low-Fat Diet, 60-80gm of protein, and 64oz of fluid    Nutrition Education: Nutrition Care Manual    Goal:   Eat a variety of fruits and vegetables, lean protein, whole grains and low-fat dairy.   Sip on water or sugar-free fluid throughout the day.   Aim for 3 meals and 1-2 snacks daily.    Discuss activity with physician and determine a plan for remaining active.    Monitor/Evaluate:  Diet tolerance, protein intake, fluid intake, frequency of meals/snacks, activity, and follow-up monthly/prn.      Freddy Ontiveros RD, LD  Clinical Bariatric Dietitian  [facility]  [phone]

## 2024-07-24 NOTE — PROGRESS NOTES
[facility] INVASIVE BARIATRIC SURG  [address]  [address]  [place] [id number]  Dept: [phone]    MEDICATION WEIGHT LOSS MANAGEMENT PROGRAM  PROGRESS NOTE     CC: Weight Loss    Patient: Luz Butler      Service Date: 7/24/2024  Visit:   1 month follow up on medications    Medical Record #: [id number]  Current Visit Weight Information  Weight: 98.4 kg (217 lb)   BMI: Body mass index is 41 kg/m².        History: 29 y.o. female who presents today for a 1 month follow up on weight loss medication. Patient has been following monthly with our office for the management of obesity since March 2024.    Medication: adipex  Amount of weight loss in the last month: 1 lbs  Amount of weight loss total: 20 lbs  Current dose: 37.5 mg  Side Effects: none currently.     Patient has been doing excellent with diet changes. She has increased her protein and typically getting over 60 grams per day. Diet recall reviewed and it looks great.    Patient's weight loss appears to have been starting to slow down. She is struggling with worsening headaches and a lot of fatigue during the day. She has been working with her PCP and neurology. Multiple medications have been tried but she had a lot of side effects.     She had a home sleep study completed through her pulmonologist. She has a lot of chronic fatigue and her boyfriend states that she does snore at night. She states she has to have an appt to review it with the doctor in August. It was mentioned possible narcolepsy but she denies falling asleep unexpectedly during the day.      Height: 1.549 m (5' 1\") 5 ft 1 in  Highest Weight:   237 lbs      Exercising?   [] Yes    [x] No   She has been increasing her walking with her son at the park. Walking 1 mile, 3-4 times per week. 30 min low impact workouts off [employer], 2-3 days per week  Review of Systems:     Review of Systems   Constitutional:  Positive for fatigue. Negative

## 2024-08-09 DIAGNOSIS — F41.9 ANXIETY: ICD-10-CM

## 2024-08-09 DIAGNOSIS — F34.1 PERSISTENT DEPRESSIVE DISORDER: ICD-10-CM

## 2024-08-09 DIAGNOSIS — K21.9 GASTROESOPHAGEAL REFLUX DISEASE, UNSPECIFIED WHETHER ESOPHAGITIS PRESENT: ICD-10-CM

## 2024-08-09 DIAGNOSIS — E55.9 VITAMIN D DEFICIENCY: ICD-10-CM

## 2024-08-09 RX ORDER — FAMOTIDINE 20 MG/1
20 TABLET, FILM COATED ORAL 2 TIMES DAILY
Qty: 180 TABLET | Refills: 1 | Status: SHIPPED | OUTPATIENT
Start: 2024-08-09

## 2024-08-09 RX ORDER — FLUOXETINE HYDROCHLORIDE 40 MG/1
40 CAPSULE ORAL DAILY
Qty: 90 CAPSULE | Refills: 1 | Status: SHIPPED | OUTPATIENT
Start: 2024-08-09

## 2024-08-09 RX ORDER — CHOLECALCIFEROL (VITAMIN D3) 25 MCG
1 TABLET ORAL DAILY
Qty: 90 TABLET | Refills: 1 | Status: SHIPPED | OUTPATIENT
Start: 2024-08-09

## 2024-08-09 NOTE — TELEPHONE ENCOUNTER
Last visit: 2024  Last Med refill: 2024  Does patient have enough medication for 72 hours: No:     Next Visit Date:  Future Appointments   Date Time Provider Department Center   2024 11:15 AM SCHEDULE, P BARIATRIC DIETICIAN [facility]   2025 10:30 AM Nancy Traylor, APRN - NP [facility] OB[facility]Gyn [facility]       Health Maintenance   Topic Date Due    HPV vaccine (2 - 3-dose series) 2020    DTaP/Tdap/Td vaccine (4 - Td or Tdap) 2024    Flu vaccine (1) 2024    Hepatitis B vaccine (1 of 3 - 3-dose series) 2024 (Originally 1995)    COVID-19 Vaccine (3 - - season) 2024 (Originally 2023)    Depression Monitoring  2025    Cervical cancer screen  2029    Hepatitis C screen  Completed    HIV screen  Completed    Hepatitis A vaccine  Aged Out    Hib vaccine  Aged Out    Polio vaccine  Aged Out    Meningococcal (ACWY) vaccine  Aged Out    Pneumococcal 0-64 years Vaccine  Aged Out    Varicella vaccine  Discontinued       Hemoglobin A1C (%)   Date Value   2023 4.9   03/10/2022 5.2   2021 5.0             ( goal A1C is < 7)   No components found for: \"LABMICR\"  No components found for: \"LDLCHOLESTEROL\", \"LDLCALC\"    (goal LDL is <100)   AST (U/L)   Date Value   2023 26     ALT (U/L)   Date Value   2023 28     BUN (mg/dL)   Date Value   2023 17     BP Readings from Last 3 Encounters:   24 110/70   24 110/70   24 123/81          (goal 120/80)    All Future Testing planned in [facility]  Lab Frequency Next Occurrence   PAP SMEAR Once 2024               Patient Active Problem List:     Heart murmur     History of  section     Class 3 severe obesity due to excess calories with serious comorbidity and body mass index (BMI) of 40.0 to 44.9 in adult (HCC)     Migraine without aura and without status migrainosus, not intractable     Mood swing     Depression     Acne vulgaris     Thinning hair     PCOS

## 2024-08-21 ENCOUNTER — OFFICE VISIT (OUTPATIENT)
Dept: BARIATRICS/WEIGHT MGMT | Age: 29
End: 2024-08-21
Payer: COMMERCIAL

## 2024-08-21 VITALS
HEIGHT: 61 IN | HEART RATE: 70 BPM | BODY MASS INDEX: 40.59 KG/M2 | WEIGHT: 215 LBS | DIASTOLIC BLOOD PRESSURE: 84 MMHG | SYSTOLIC BLOOD PRESSURE: 130 MMHG

## 2024-08-21 DIAGNOSIS — E66.01 CLASS 3 SEVERE OBESITY DUE TO EXCESS CALORIES WITH SERIOUS COMORBIDITY AND BODY MASS INDEX (BMI) OF 40.0 TO 44.9 IN ADULT (HCC): ICD-10-CM

## 2024-08-21 DIAGNOSIS — E28.2 PCOS (POLYCYSTIC OVARIAN SYNDROME): Primary | ICD-10-CM

## 2024-08-21 DIAGNOSIS — E06.3 HASHIMOTO'S THYROIDITIS: ICD-10-CM

## 2024-08-21 PROCEDURE — G8427 DOCREV CUR MEDS BY ELIG CLIN: HCPCS | Performed by: NURSE PRACTITIONER

## 2024-08-21 PROCEDURE — 99213 OFFICE O/P EST LOW 20 MIN: CPT | Performed by: NURSE PRACTITIONER

## 2024-08-21 PROCEDURE — 1036F TOBACCO NON-USER: CPT | Performed by: NURSE PRACTITIONER

## 2024-08-21 PROCEDURE — G8417 CALC BMI ABV UP PARAM F/U: HCPCS | Performed by: NURSE PRACTITIONER

## 2024-08-21 RX ORDER — PHENTERMINE HYDROCHLORIDE 37.5 MG/1
37.5 TABLET ORAL
Qty: 30 TABLET | Refills: 0 | Status: SHIPPED | OUTPATIENT
Start: 2024-08-21 | End: 2024-09-20

## 2024-08-21 ASSESSMENT — ENCOUNTER SYMPTOMS
NAUSEA: 0
VOMITING: 0
SHORTNESS OF BREATH: 0
CHEST TIGHTNESS: 0
WHEEZING: 0
COUGH: 0
ABDOMINAL PAIN: 0

## 2024-08-21 NOTE — PATIENT INSTRUCTIONS
Adipex Taper    Take 1 tab daily for 2 weeks  Take 1/2 tab daily for 2 weeks  Take 1/2 tab every other until complete

## 2024-08-21 NOTE — PROGRESS NOTES
and light-headedness.   All other systems reviewed and are negative.      Physical Assessment:     /84 (Site: Right Upper Arm, Position: Sitting, Cuff Size: Large Adult)   Pulse 70   Ht 1.549 m (5' 1\")   Wt 97.5 kg (215 lb)   BMI 40.62 kg/m²     Physical Exam  Vitals and nursing note reviewed.   Constitutional:       General: She is not in acute distress.     Appearance: Normal appearance. She is obese. She is not ill-appearing, toxic-appearing or diaphoretic.   HENT:      Head: Normocephalic and atraumatic.   Cardiovascular:      Rate and Rhythm: Normal rate and regular rhythm.      Pulses: Normal pulses.      Heart sounds: Normal heart sounds. No murmur heard.  Pulmonary:      Effort: Pulmonary effort is normal.      Breath sounds: Normal breath sounds. No wheezing or rhonchi.   Skin:     General: Skin is warm and dry.   Neurological:      General: No focal deficit present.      Mental Status: She is alert and oriented to person, place, and time.      Gait: Gait normal.   Psychiatric:         Mood and Affect: Mood normal.         Behavior: Behavior normal.         Thought Content: Thought content normal.         Judgment: Judgment normal.         Assessment & Plan:      1. PCOS (polycystic ovarian syndrome)    2. Class 3 severe obesity due to excess calories with serious comorbidity and body mass index (BMI) of 40.0 to 44.9 in adult (HCC)    3. Hashimoto's thyroiditis               [x] Daily protein (65-75gm/day)   [x] Exercise Regularly     Dietician consult - see note  Refill of adipex sent to the pharmacy with instructions on the AVS to taper off. We will see her back in 6 months to re evaluate. She needs to have maintained the 5% weight loss from her start weight which could be 12 lbs. She can do another round of adipex as long as her weight is at 225 lbs or lower.   Discussed the importance of staying consistent with diet changes and start increasing activity/starting exercise routine.   Continue

## 2024-08-21 NOTE — PROGRESS NOTES
monthly/prn.      Freddy Ontiveros, CHET, LD  Clinical Bariatric Dietitian  [facility] Surgical Specialist  [phone]

## 2024-10-21 ENCOUNTER — TELEPHONE (OUTPATIENT)
Dept: FAMILY MEDICINE CLINIC | Age: 29
End: 2024-10-21

## 2024-10-21 ENCOUNTER — OFFICE VISIT (OUTPATIENT)
Dept: FAMILY MEDICINE CLINIC | Age: 29
End: 2024-10-21
Payer: COMMERCIAL

## 2024-10-21 VITALS
WEIGHT: 218 LBS | HEART RATE: 66 BPM | HEIGHT: 61 IN | OXYGEN SATURATION: 95 % | BODY MASS INDEX: 41.16 KG/M2 | DIASTOLIC BLOOD PRESSURE: 78 MMHG | SYSTOLIC BLOOD PRESSURE: 122 MMHG | RESPIRATION RATE: 18 BRPM

## 2024-10-21 DIAGNOSIS — F41.9 ANXIETY: Primary | ICD-10-CM

## 2024-10-21 DIAGNOSIS — F41.9 ANXIETY: ICD-10-CM

## 2024-10-21 DIAGNOSIS — F34.1 PERSISTENT DEPRESSIVE DISORDER: ICD-10-CM

## 2024-10-21 DIAGNOSIS — E55.9 VITAMIN D DEFICIENCY: ICD-10-CM

## 2024-10-21 PROCEDURE — G8484 FLU IMMUNIZE NO ADMIN: HCPCS | Performed by: NURSE PRACTITIONER

## 2024-10-21 PROCEDURE — G8427 DOCREV CUR MEDS BY ELIG CLIN: HCPCS | Performed by: NURSE PRACTITIONER

## 2024-10-21 PROCEDURE — 99214 OFFICE O/P EST MOD 30 MIN: CPT | Performed by: NURSE PRACTITIONER

## 2024-10-21 PROCEDURE — 1036F TOBACCO NON-USER: CPT | Performed by: NURSE PRACTITIONER

## 2024-10-21 PROCEDURE — G8417 CALC BMI ABV UP PARAM F/U: HCPCS | Performed by: NURSE PRACTITIONER

## 2024-10-21 RX ORDER — FLUOXETINE 20 MG/1
TABLET, FILM COATED ORAL
Qty: 30 TABLET | Refills: 3 | Status: SHIPPED | OUTPATIENT
Start: 2024-10-21 | End: 2024-10-21 | Stop reason: SDUPTHER

## 2024-10-21 RX ORDER — BUSPIRONE HYDROCHLORIDE 7.5 MG/1
7.5 TABLET ORAL 2 TIMES DAILY PRN
Qty: 60 TABLET | Refills: 0 | Status: SHIPPED | OUTPATIENT
Start: 2024-10-21 | End: 2024-11-20

## 2024-10-21 RX ORDER — CHOLECALCIFEROL (VITAMIN D3) 25 MCG
1 TABLET ORAL DAILY
Qty: 90 TABLET | Refills: 1 | Status: SHIPPED | OUTPATIENT
Start: 2024-10-21

## 2024-10-21 RX ORDER — FLUOXETINE 20 MG/1
TABLET, FILM COATED ORAL
Qty: 30 TABLET | Refills: 0 | Status: SHIPPED | OUTPATIENT
Start: 2024-10-21 | End: 2024-11-20

## 2024-10-21 SDOH — ECONOMIC STABILITY: TRANSPORTATION INSECURITY
IN THE PAST 12 MONTHS, HAS LACK OF TRANSPORTATION KEPT YOU FROM MEETINGS, WORK, OR FROM GETTING THINGS NEEDED FOR DAILY LIVING?: NO

## 2024-10-21 SDOH — ECONOMIC STABILITY: FOOD INSECURITY: WITHIN THE PAST 12 MONTHS, THE FOOD YOU BOUGHT JUST DIDN'T LAST AND YOU DIDN'T HAVE MONEY TO GET MORE.: SOMETIMES TRUE

## 2024-10-21 SDOH — ECONOMIC STABILITY: INCOME INSECURITY: HOW HARD IS IT FOR YOU TO PAY FOR THE VERY BASICS LIKE FOOD, HOUSING, MEDICAL CARE, AND HEATING?: NOT VERY HARD

## 2024-10-21 SDOH — ECONOMIC STABILITY: FOOD INSECURITY: WITHIN THE PAST 12 MONTHS, YOU WORRIED THAT YOUR FOOD WOULD RUN OUT BEFORE YOU GOT MONEY TO BUY MORE.: SOMETIMES TRUE

## 2024-10-21 ASSESSMENT — ENCOUNTER SYMPTOMS
SINUS PRESSURE: 0
BACK PAIN: 0
SINUS PAIN: 0
SHORTNESS OF BREATH: 0
EYE PAIN: 0
CHEST TIGHTNESS: 0
VOMITING: 0
CONSTIPATION: 0
DIARRHEA: 0
ABDOMINAL PAIN: 0
NAUSEA: 0
COLOR CHANGE: 0

## 2024-10-21 NOTE — TELEPHONE ENCOUNTER
Pharmacist called he is wondering what are the directions for the medication FLUoxetine 20 mg tab after the last 6 days? Why is it three refills?

## 2024-10-21 NOTE — TELEPHONE ENCOUNTER
My apologies there should have been no refills. It is a 30 day taper of the prozac. She is to stop med after last 6 days

## 2024-10-21 NOTE — ASSESSMENT & PLAN NOTE
Orders:    vitamin D3 (CHOLECALCIFEROL) 25 MCG (1000 UT) TABS tablet; Take 1 tablet by mouth daily

## 2024-10-21 NOTE — ASSESSMENT & PLAN NOTE
Taper schedule for prozac as indicated.   Please contact office or seek emergent help if feelings of depression suddenly worsen.

## 2024-10-21 NOTE — PROGRESS NOTES
nausea and vomiting.   Endocrine: Negative for polydipsia, polyphagia and polyuria.   Genitourinary:  Negative for dysuria, flank pain, frequency and urgency.   Musculoskeletal:  Negative for arthralgias, back pain and myalgias.   Skin:  Negative for color change and rash.   Neurological:  Negative for dizziness, weakness, light-headedness, numbness and headaches.   Psychiatric/Behavioral:  Negative for confusion, hallucinations, self-injury, sleep disturbance and suicidal ideas. The patient is not nervous/anxious.           Objective   Physical Exam  Vitals and nursing note reviewed.   Constitutional:       Appearance: Normal appearance.   HENT:      Head: Normocephalic.      Right Ear: Hearing normal.      Left Ear: Hearing normal.      Nose: Nose normal.      Mouth/Throat:      Mouth: Mucous membranes are moist.      Pharynx: Oropharynx is clear.   Eyes:      Extraocular Movements: Extraocular movements intact.      Conjunctiva/sclera: Conjunctivae normal.      Pupils: Pupils are equal, round, and reactive to light.   Cardiovascular:      Rate and Rhythm: Normal rate and regular rhythm.      Pulses: Normal pulses.      Heart sounds: Normal heart sounds.   Pulmonary:      Effort: Pulmonary effort is normal.      Breath sounds: Normal breath sounds.   Abdominal:      General: Abdomen is flat. Bowel sounds are normal.      Palpations: Abdomen is soft.   Musculoskeletal:         General: Normal range of motion.      Cervical back: Normal range of motion.   Skin:     General: Skin is warm and dry.      Capillary Refill: Capillary refill takes less than 2 seconds.   Neurological:      General: No focal deficit present.      Mental Status: She is alert and oriented to person, place, and time. Mental status is at baseline.   Psychiatric:         Mood and Affect: Mood normal.         Behavior: Behavior normal.         Thought Content: Thought content normal.         Judgment: Judgment normal.                Wt Readings

## 2024-12-19 ENCOUNTER — PATIENT MESSAGE (OUTPATIENT)
Dept: FAMILY MEDICINE CLINIC | Age: 29
End: 2024-12-19

## 2024-12-19 DIAGNOSIS — F34.1 PERSISTENT DEPRESSIVE DISORDER: Primary | ICD-10-CM

## 2025-02-16 ASSESSMENT — PATIENT HEALTH QUESTIONNAIRE - PHQ9
SUM OF ALL RESPONSES TO PHQ QUESTIONS 1-9: 9
6. FEELING BAD ABOUT YOURSELF - OR THAT YOU ARE A FAILURE OR HAVE LET YOURSELF OR YOUR FAMILY DOWN: NOT AT ALL
8. MOVING OR SPEAKING SO SLOWLY THAT OTHER PEOPLE COULD HAVE NOTICED. OR THE OPPOSITE, BEING SO FIGETY OR RESTLESS THAT YOU HAVE BEEN MOVING AROUND A LOT MORE THAN USUAL: NOT AT ALL
7. TROUBLE CONCENTRATING ON THINGS, SUCH AS READING THE NEWSPAPER OR WATCHING TELEVISION: MORE THAN HALF THE DAYS
10. IF YOU CHECKED OFF ANY PROBLEMS, HOW DIFFICULT HAVE THESE PROBLEMS MADE IT FOR YOU TO DO YOUR WORK, TAKE CARE OF THINGS AT HOME, OR GET ALONG WITH OTHER PEOPLE: VERY DIFFICULT
6. FEELING BAD ABOUT YOURSELF - OR THAT YOU ARE A FAILURE OR HAVE LET YOURSELF OR YOUR FAMILY DOWN: NOT AT ALL
5. POOR APPETITE OR OVEREATING: SEVERAL DAYS
1. LITTLE INTEREST OR PLEASURE IN DOING THINGS: MORE THAN HALF THE DAYS
2. FEELING DOWN, DEPRESSED OR HOPELESS: SEVERAL DAYS
SUM OF ALL RESPONSES TO PHQ QUESTIONS 1-9: 9
2. FEELING DOWN, DEPRESSED OR HOPELESS: SEVERAL DAYS
1. LITTLE INTEREST OR PLEASURE IN DOING THINGS: MORE THAN HALF THE DAYS
8. MOVING OR SPEAKING SO SLOWLY THAT OTHER PEOPLE COULD HAVE NOTICED. OR THE OPPOSITE - BEING SO FIDGETY OR RESTLESS THAT YOU HAVE BEEN MOVING AROUND A LOT MORE THAN USUAL: NOT AT ALL
3. TROUBLE FALLING OR STAYING ASLEEP: SEVERAL DAYS
5. POOR APPETITE OR OVEREATING: SEVERAL DAYS
SUM OF ALL RESPONSES TO PHQ QUESTIONS 1-9: 9
4. FEELING TIRED OR HAVING LITTLE ENERGY: MORE THAN HALF THE DAYS
SUM OF ALL RESPONSES TO PHQ QUESTIONS 1-9: 9
4. FEELING TIRED OR HAVING LITTLE ENERGY: MORE THAN HALF THE DAYS
SUM OF ALL RESPONSES TO PHQ9 QUESTIONS 1 & 2: 3
9. THOUGHTS THAT YOU WOULD BE BETTER OFF DEAD, OR OF HURTING YOURSELF: NOT AT ALL
9. THOUGHTS THAT YOU WOULD BE BETTER OFF DEAD, OR OF HURTING YOURSELF: NOT AT ALL
10. IF YOU CHECKED OFF ANY PROBLEMS, HOW DIFFICULT HAVE THESE PROBLEMS MADE IT FOR YOU TO DO YOUR WORK, TAKE CARE OF THINGS AT HOME, OR GET ALONG WITH OTHER PEOPLE: VERY DIFFICULT
7. TROUBLE CONCENTRATING ON THINGS, SUCH AS READING THE NEWSPAPER OR WATCHING TELEVISION: MORE THAN HALF THE DAYS
SUM OF ALL RESPONSES TO PHQ QUESTIONS 1-9: 9
3. TROUBLE FALLING OR STAYING ASLEEP: SEVERAL DAYS

## 2025-02-17 ENCOUNTER — OFFICE VISIT (OUTPATIENT)
Dept: FAMILY MEDICINE CLINIC | Age: 30
End: 2025-02-17
Payer: COMMERCIAL

## 2025-02-17 VITALS
WEIGHT: 231 LBS | HEIGHT: 61 IN | OXYGEN SATURATION: 97 % | BODY MASS INDEX: 43.61 KG/M2 | HEART RATE: 63 BPM | SYSTOLIC BLOOD PRESSURE: 114 MMHG | DIASTOLIC BLOOD PRESSURE: 70 MMHG

## 2025-02-17 DIAGNOSIS — Z13.1 SCREENING FOR DIABETES MELLITUS: ICD-10-CM

## 2025-02-17 DIAGNOSIS — E06.3 HASHIMOTO THYROIDITIS: ICD-10-CM

## 2025-02-17 DIAGNOSIS — Z00.00 ENCOUNTER FOR WELL ADULT EXAM WITHOUT ABNORMAL FINDINGS: Primary | ICD-10-CM

## 2025-02-17 DIAGNOSIS — Z23 NEED FOR TDAP VACCINATION: ICD-10-CM

## 2025-02-17 DIAGNOSIS — Z13.0 SCREENING FOR DEFICIENCY ANEMIA: ICD-10-CM

## 2025-02-17 DIAGNOSIS — E66.01 CLASS 3 SEVERE OBESITY DUE TO EXCESS CALORIES WITH SERIOUS COMORBIDITY AND BODY MASS INDEX (BMI) OF 40.0 TO 44.9 IN ADULT: ICD-10-CM

## 2025-02-17 DIAGNOSIS — E66.813 CLASS 3 SEVERE OBESITY DUE TO EXCESS CALORIES WITH SERIOUS COMORBIDITY AND BODY MASS INDEX (BMI) OF 40.0 TO 44.9 IN ADULT: ICD-10-CM

## 2025-02-17 PROCEDURE — 99395 PREV VISIT EST AGE 18-39: CPT | Performed by: NURSE PRACTITIONER

## 2025-02-17 PROCEDURE — 90715 TDAP VACCINE 7 YRS/> IM: CPT | Performed by: NURSE PRACTITIONER

## 2025-02-17 PROCEDURE — 90471 IMMUNIZATION ADMIN: CPT | Performed by: NURSE PRACTITIONER

## 2025-02-17 RX ORDER — PHENTERMINE HYDROCHLORIDE 37.5 MG/1
37.5 TABLET ORAL
Qty: 30 TABLET | Refills: 0 | Status: SHIPPED | OUTPATIENT
Start: 2025-02-17 | End: 2025-03-19

## 2025-02-17 SDOH — ECONOMIC STABILITY: FOOD INSECURITY: WITHIN THE PAST 12 MONTHS, THE FOOD YOU BOUGHT JUST DIDN'T LAST AND YOU DIDN'T HAVE MONEY TO GET MORE.: NEVER TRUE

## 2025-02-17 SDOH — ECONOMIC STABILITY: FOOD INSECURITY: WITHIN THE PAST 12 MONTHS, YOU WORRIED THAT YOUR FOOD WOULD RUN OUT BEFORE YOU GOT MONEY TO BUY MORE.: NEVER TRUE

## 2025-02-17 NOTE — PROGRESS NOTES
Health Maintenance Status  Immunization History   Administered Date(s) Administered    COVID-19, MODERNA BLUE border, Primary or Immunocompromised, (age 12y+), IM, 100 mcg/0.5mL 12/30/2020, 01/27/2021    DTaP 05/04/2014    HPV, GARDASIL 9, (age 9y-45y), IM, 0.5mL 02/20/2020    Influenza Virus Vaccine 11/02/2017, 10/02/2018, 10/25/2019, 10/21/2022    Influenza, AFLURIA (age 3 y+), FLUZONE, (age 6 mo+), Quadv MDV, 0.5mL 10/02/2018    Influenza, FLUARIX, FLULAVAL, FLUZONE (age 6 mo+) and AFLURIA, (age 3 y+), Quadv PF, 0.5mL 11/01/2016    Meningococcal ACWY, MENACTRA (MenACWY-D), (age 9m-55y), IM, 0.5mL 05/10/2007    TDaP, ADACEL (age 10y-64y), BOOSTRIX (age 10y+), IM, 0.5mL 05/10/2007, 03/20/2014, 02/17/2025    Varicella, VARIVAX, (age 12m+), SC, 0.5mL 02/20/2020        Health Maintenance Due   Topic Date Due    Hepatitis B vaccine (1 of 3 - 19+ 3-dose series) Never done    HPV vaccine (2 - 3-dose series) 03/19/2020      Recommendations for Preventive Services Due: see orders and patient instructions/AVS.

## 2025-02-17 NOTE — PATIENT INSTRUCTIONS
hands, brush your teeth twice a day, and wear a seat belt in the car.   Where can you learn more?  Go to https://www.[employer]/patientEd and enter P072 to learn more about \"Well Visit, Ages 18 to 65: Care Instructions.\"  Current as of: April 30, 2024  Content Version: 14.3  © 2024 [employer].   Care instructions adapted under license by [email or web address]. If you have questions about a medical condition or this instruction, always ask your healthcare professional. [employer] disclaims any warranty or liability for your use of this information.

## 2025-02-18 ASSESSMENT — ENCOUNTER SYMPTOMS
DIARRHEA: 0
CHEST TIGHTNESS: 0
SINUS PAIN: 0
SINUS PRESSURE: 0
SHORTNESS OF BREATH: 0
EYE PAIN: 0
VOMITING: 0
NAUSEA: 0
BACK PAIN: 0
CONSTIPATION: 0
COLOR CHANGE: 0
ABDOMINAL PAIN: 0

## 2025-03-19 ENCOUNTER — OFFICE VISIT (OUTPATIENT)
Dept: FAMILY MEDICINE CLINIC | Age: 30
End: 2025-03-19
Payer: COMMERCIAL

## 2025-03-19 VITALS
BODY MASS INDEX: 43.69 KG/M2 | HEIGHT: 61 IN | WEIGHT: 231.4 LBS | SYSTOLIC BLOOD PRESSURE: 118 MMHG | RESPIRATION RATE: 18 BRPM | OXYGEN SATURATION: 98 % | HEART RATE: 59 BPM | DIASTOLIC BLOOD PRESSURE: 78 MMHG

## 2025-03-19 DIAGNOSIS — L30.4 INTERTRIGO: Primary | ICD-10-CM

## 2025-03-19 DIAGNOSIS — F34.1 PERSISTENT DEPRESSIVE DISORDER: ICD-10-CM

## 2025-03-19 DIAGNOSIS — E66.813 CLASS 3 SEVERE OBESITY DUE TO EXCESS CALORIES WITH SERIOUS COMORBIDITY AND BODY MASS INDEX (BMI) OF 40.0 TO 44.9 IN ADULT: ICD-10-CM

## 2025-03-19 DIAGNOSIS — E66.01 CLASS 3 SEVERE OBESITY DUE TO EXCESS CALORIES WITH SERIOUS COMORBIDITY AND BODY MASS INDEX (BMI) OF 40.0 TO 44.9 IN ADULT: ICD-10-CM

## 2025-03-19 PROCEDURE — 99214 OFFICE O/P EST MOD 30 MIN: CPT | Performed by: NURSE PRACTITIONER

## 2025-03-19 PROCEDURE — 1036F TOBACCO NON-USER: CPT | Performed by: NURSE PRACTITIONER

## 2025-03-19 PROCEDURE — G8427 DOCREV CUR MEDS BY ELIG CLIN: HCPCS | Performed by: NURSE PRACTITIONER

## 2025-03-19 PROCEDURE — G8417 CALC BMI ABV UP PARAM F/U: HCPCS | Performed by: NURSE PRACTITIONER

## 2025-03-19 RX ORDER — NYSTATIN 100000 [USP'U]/G
POWDER TOPICAL
Qty: 30 G | Refills: 1 | Status: SHIPPED | OUTPATIENT
Start: 2025-03-19

## 2025-03-19 RX ORDER — PHENTERMINE HYDROCHLORIDE 37.5 MG/1
37.5 TABLET ORAL
Qty: 30 TABLET | Refills: 0 | Status: SHIPPED | OUTPATIENT
Start: 2025-03-19 | End: 2025-04-18

## 2025-03-19 RX ORDER — FLUOXETINE 10 MG/1
10 CAPSULE ORAL DAILY
Qty: 90 CAPSULE | Refills: 1 | Status: SHIPPED | OUTPATIENT
Start: 2025-03-19

## 2025-03-19 ASSESSMENT — ENCOUNTER SYMPTOMS
ABDOMINAL PAIN: 0
COLOR CHANGE: 0
CHEST TIGHTNESS: 0
SINUS PRESSURE: 0
CONSTIPATION: 0
SINUS PAIN: 0
DIARRHEA: 0
SHORTNESS OF BREATH: 0
VOMITING: 0
EYE PAIN: 0
BACK PAIN: 0
NAUSEA: 0

## 2025-03-19 NOTE — ASSESSMENT & PLAN NOTE
Unfortunately was not able to fill Rx last month. New pharmacy selected today. Rx for Adipex with indication for use. PDMP reviewed and consistent with what was reported during exam. Advised to contact office if unable to  Rx again.     Orders:    phentermine (ADIPEX-P) 37.5 MG tablet; Take 1 tablet by mouth every morning (before breakfast) for 30 days. Max Daily Amount: 37.5 mg

## 2025-03-19 NOTE — ASSESSMENT & PLAN NOTE
Chronic, at goal (stable), continue current treatment plan    Orders:    FLUoxetine (PROZAC) 10 MG capsule; Take 1 capsule by mouth daily To be taken with 20mg fluoxetine for a total of 30mg daily    FLUoxetine (PROZAC) 20 MG capsule; Take 1 capsule by mouth daily

## 2025-03-19 NOTE — PROGRESS NOTES
Itching seems worse since re starting gym regimen and sweating more    Denies any other problems/concerns at this time    Denies any other problems/concerns         Review of Systems   Constitutional:  Negative for activity change, chills, fatigue and unexpected weight change.   HENT:  Negative for hearing loss, postnasal drip, sinus pressure and sinus pain.    Eyes:  Negative for pain and visual disturbance.   Respiratory:  Negative for chest tightness and shortness of breath.    Cardiovascular:  Negative for chest pain and palpitations.   Gastrointestinal:  Negative for abdominal pain, constipation, diarrhea, nausea and vomiting.   Endocrine: Negative for polydipsia, polyphagia and polyuria.   Genitourinary:  Negative for dysuria, flank pain, frequency and urgency.   Musculoskeletal:  Negative for arthralgias, back pain and myalgias.   Skin:  Positive for rash. Negative for color change.   Neurological:  Negative for dizziness, weakness, light-headedness, numbness and headaches.   Psychiatric/Behavioral:  Negative for confusion, hallucinations, self-injury, sleep disturbance and suicidal ideas. The patient is not nervous/anxious.           Objective   Physical Exam  Vitals and nursing note reviewed.   Constitutional:       Appearance: Normal appearance.   HENT:      Head: Normocephalic.      Right Ear: Hearing normal.      Left Ear: Hearing normal.      Nose: Nose normal.      Mouth/Throat:      Mouth: Mucous membranes are moist.      Pharynx: Oropharynx is clear.   Eyes:      Extraocular Movements: Extraocular movements intact.      Conjunctiva/sclera: Conjunctivae normal.      Pupils: Pupils are equal, round, and reactive to light.   Cardiovascular:      Rate and Rhythm: Normal rate and regular rhythm.      Pulses: Normal pulses.      Heart sounds: Normal heart sounds.   Pulmonary:      Effort: Pulmonary effort is normal.      Breath sounds: Normal breath sounds.   Abdominal:      General: Abdomen is flat.

## 2025-04-16 ENCOUNTER — HOSPITAL ENCOUNTER (OUTPATIENT)
Age: 30
Setting detail: SPECIMEN
Discharge: HOME | End: 2025-04-16

## 2025-04-16 ENCOUNTER — OFFICE VISIT (OUTPATIENT)
Dept: OBGYN CLINIC | Age: 30
End: 2025-04-16
Payer: COMMERCIAL

## 2025-04-16 VITALS
HEART RATE: 86 BPM | DIASTOLIC BLOOD PRESSURE: 74 MMHG | BODY MASS INDEX: 45.16 KG/M2 | HEIGHT: 60 IN | RESPIRATION RATE: 18 BRPM | WEIGHT: 230 LBS | SYSTOLIC BLOOD PRESSURE: 116 MMHG

## 2025-04-16 DIAGNOSIS — Z01.419 WELL FEMALE EXAM WITH ROUTINE GYNECOLOGICAL EXAM: Primary | ICD-10-CM

## 2025-04-16 DIAGNOSIS — Z11.51 SPECIAL SCREENING EXAMINATION FOR HUMAN PAPILLOMAVIRUS (HPV): ICD-10-CM

## 2025-04-16 PROCEDURE — 99395 PREV VISIT EST AGE 18-39: CPT | Performed by: NURSE PRACTITIONER

## 2025-04-16 NOTE — PROGRESS NOTES
This test is intended for medical purposes only and is not valid for  the evaluation of suspected sexual abuse or for   other forensic purposes. Performed at [facility], [address] [place] [address] [id number] [phone].               Source of Specimen:  A: Imaged ThinPrep Pap - Cervical (1 monolayer slide)    HPV Reflex?......................HPV Regardless      Clinical History  Z01.419 Routine gyn exam without abnormal findings  Z11.51 Encounter for screening for HPV    Processing Lab: [address] [address]  Interpretation performed at [address] [address]  This Pap Test has been evaluated with the assistance of the ThinPrep  Pap Test Imaging System.  The Pap smear is a screening test primarily for squamous epithelial  lesions, which is subject to both false negative and false positive  results. Your patient should be reminded to consult you immediately if  she experiences any suspicious signs or symptoms, regardless of her  Pap smear result.  GYNECOLOGIC CYTOLOGY REPORT    Patient Name: MYRNA PEÑALOZA  [facility] Rec: [id number]  [facility]  [facility]  ANATOMIC PATHOLOGY  [address] [place] [address].  [place], [place] [address] [phone]  Fax: [phone]     ]  Abnormal Pap Smear History: lsil + HPV  Colposcopy History:   Date of Last Mammogram:   Date of Last Colonoscopy:   Date of Last Bone Density:       ________________________________________________________________________        REVIEW OF SYSTEMS:    see problem list    A minimum of an eleven point review of systems was completed.    Review Of Systems (11 point):  Constitutional: No fever, chills or malaise; No weight change or fatigue  Head and Eyes: No vision changes, Headache, Dizziness or trauma in last 12 months  ENT ROS: No hearing, Tinnitis, sinus or taste problems  Hematological and Lymphatic ROS:No Lymphoma, Von

## 2025-04-17 ENCOUNTER — RESULTS FOLLOW-UP (OUTPATIENT)
Dept: OBGYN CLINIC | Age: 30
End: 2025-04-17

## 2025-04-17 LAB
HPV I/H RISK 4 DNA CVX QL NAA+PROBE: NOT DETECTED
HPV SAMPLE: NORMAL
HPV, INTERPRETATION: NORMAL
HPV16 DNA CVX QL NAA+PROBE: NOT DETECTED
HPV18 DNA CVX QL NAA+PROBE: NOT DETECTED
SPECIMEN DESCRIPTION: NORMAL

## 2025-04-29 LAB — CYTOLOGY REPORT: NORMAL

## 2025-06-26 DIAGNOSIS — F34.1 PERSISTENT DEPRESSIVE DISORDER: ICD-10-CM

## 2025-06-26 RX ORDER — FLUOXETINE 10 MG/1
CAPSULE ORAL
Qty: 90 CAPSULE | Refills: 1 | OUTPATIENT
Start: 2025-06-26

## 2025-06-26 NOTE — TELEPHONE ENCOUNTER
(HCC)     Migraine without aura and without status migrainosus, not intractable     Mood swing     Depression     Acne vulgaris     Thinning hair     PCOS (polycystic ovarian syndrome)     Mild episode of recurrent major depressive disorder     Gastroesophageal reflux disease     Chronic midline thoracic back pain     Neck pain, chronic     Numbness and tingling in both hands     Vitamin D deficiency     Thyroiditis     Hashimoto thyroiditis